# Patient Record
Sex: FEMALE | Race: WHITE | NOT HISPANIC OR LATINO | Employment: OTHER | URBAN - METROPOLITAN AREA
[De-identification: names, ages, dates, MRNs, and addresses within clinical notes are randomized per-mention and may not be internally consistent; named-entity substitution may affect disease eponyms.]

---

## 2024-08-28 ENCOUNTER — APPOINTMENT (OUTPATIENT)
Dept: RADIOLOGY | Facility: HOSPITAL | Age: 79
DRG: 871 | End: 2024-08-28
Payer: MEDICARE

## 2024-08-28 ENCOUNTER — APPOINTMENT (EMERGENCY)
Dept: RADIOLOGY | Facility: HOSPITAL | Age: 79
DRG: 871 | End: 2024-08-28
Payer: MEDICARE

## 2024-08-28 ENCOUNTER — HOSPITAL ENCOUNTER (INPATIENT)
Facility: HOSPITAL | Age: 79
LOS: 9 days | Discharge: NURSING, REHAB OR LONG-TERM CARE | DRG: 871 | End: 2024-09-07
Attending: EMERGENCY MEDICINE
Payer: MEDICARE

## 2024-08-28 DIAGNOSIS — S81.809A MULTIPLE OPEN WOUNDS OF LOWER LEG: ICD-10-CM

## 2024-08-28 DIAGNOSIS — A41.9 SEPSIS, DUE TO UNSPECIFIED ORGANISM, UNSPECIFIED WHETHER ACUTE ORGAN DYSFUNCTION PRESENT (HCC): ICD-10-CM

## 2024-08-28 DIAGNOSIS — R26.2 AMBULATORY DYSFUNCTION: Primary | ICD-10-CM

## 2024-08-28 DIAGNOSIS — S22.49XA MULTIPLE RIB FRACTURES: ICD-10-CM

## 2024-08-28 DIAGNOSIS — I95.9 HYPOTENSION: ICD-10-CM

## 2024-08-28 DIAGNOSIS — L03.116 BILATERAL LOWER LEG CELLULITIS: ICD-10-CM

## 2024-08-28 DIAGNOSIS — L03.115 BILATERAL LOWER LEG CELLULITIS: ICD-10-CM

## 2024-08-28 DIAGNOSIS — R21 RASH: ICD-10-CM

## 2024-08-28 DIAGNOSIS — E55.9 VITAMIN D DEFICIENCY: ICD-10-CM

## 2024-08-28 DIAGNOSIS — B35.1 ONYCHOMYCOSIS: ICD-10-CM

## 2024-08-28 DIAGNOSIS — R53.1 WEAKNESS: ICD-10-CM

## 2024-08-28 PROBLEM — R65.10 SIRS (SYSTEMIC INFLAMMATORY RESPONSE SYNDROME) (HCC): Status: ACTIVE | Noted: 2024-08-28
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
## 2024-08-28 PROBLEM — E87.1 HYPONATREMIA: Status: ACTIVE | Noted: 2024-08-28

## 2024-08-28 LAB
25(OH)D3 SERPL-MCNC: <7 NG/ML (ref 30–100)
2HR DELTA HS TROPONIN: 2 NG/L
4HR DELTA HS TROPONIN: 3 NG/L
ALBUMIN SERPL BCG-MCNC: 3.6 G/DL (ref 3.5–5)
ALP SERPL-CCNC: 118 U/L (ref 34–104)
ALT SERPL W P-5'-P-CCNC: 21 U/L (ref 7–52)
ANION GAP SERPL CALCULATED.3IONS-SCNC: 10 MMOL/L (ref 4–13)
AST SERPL W P-5'-P-CCNC: 19 U/L (ref 13–39)
ATRIAL RATE: 97 BPM
BACTERIA UR QL AUTO: ABNORMAL /HPF
BASOPHILS # BLD AUTO: 0.05 THOUSANDS/ÂΜL (ref 0–0.1)
BASOPHILS NFR BLD AUTO: 0 % (ref 0–1)
BILIRUB SERPL-MCNC: 0.42 MG/DL (ref 0.2–1)
BILIRUB UR QL STRIP: NEGATIVE
BUN SERPL-MCNC: 35 MG/DL (ref 5–25)
CALCIUM SERPL-MCNC: 9.3 MG/DL (ref 8.4–10.2)
CARDIAC TROPONIN I PNL SERPL HS: 11 NG/L
CARDIAC TROPONIN I PNL SERPL HS: 12 NG/L
CARDIAC TROPONIN I PNL SERPL HS: 9 NG/L
CHLORIDE SERPL-SCNC: 102 MMOL/L (ref 96–108)
CK SERPL-CCNC: 25 U/L (ref 26–192)
CLARITY UR: CLEAR
CO2 SERPL-SCNC: 22 MMOL/L (ref 21–32)
COLOR UR: YELLOW
CREAT SERPL-MCNC: 1.16 MG/DL (ref 0.6–1.3)
EOSINOPHIL # BLD AUTO: 0.06 THOUSAND/ÂΜL (ref 0–0.61)
EOSINOPHIL NFR BLD AUTO: 0 % (ref 0–6)
ERYTHROCYTE [DISTWIDTH] IN BLOOD BY AUTOMATED COUNT: 20.1 % (ref 11.6–15.1)
GFR SERPL CREATININE-BSD FRML MDRD: 45 ML/MIN/1.73SQ M
GLUCOSE SERPL-MCNC: 103 MG/DL (ref 65–140)
GLUCOSE UR STRIP-MCNC: NEGATIVE MG/DL
HCT VFR BLD AUTO: 48.3 % (ref 34.8–46.1)
HGB BLD-MCNC: 15 G/DL (ref 11.5–15.4)
HGB UR QL STRIP.AUTO: NEGATIVE
IMM GRANULOCYTES # BLD AUTO: 0.18 THOUSAND/UL (ref 0–0.2)
IMM GRANULOCYTES NFR BLD AUTO: 1 % (ref 0–2)
KETONES UR STRIP-MCNC: ABNORMAL MG/DL
LACTATE SERPL-SCNC: 2 MMOL/L (ref 0.5–2)
LEUKOCYTE ESTERASE UR QL STRIP: NEGATIVE
LYMPHOCYTES # BLD AUTO: 0.48 THOUSANDS/ÂΜL (ref 0.6–4.47)
LYMPHOCYTES NFR BLD AUTO: 3 % (ref 14–44)
MAGNESIUM SERPL-MCNC: 2.4 MG/DL (ref 1.9–2.7)
MCH RBC QN AUTO: 26.3 PG (ref 26.8–34.3)
MCHC RBC AUTO-ENTMCNC: 31.1 G/DL (ref 31.4–37.4)
MCV RBC AUTO: 85 FL (ref 82–98)
MONOCYTES # BLD AUTO: 1.07 THOUSAND/ÂΜL (ref 0.17–1.22)
MONOCYTES NFR BLD AUTO: 7 % (ref 4–12)
MUCOUS THREADS UR QL AUTO: ABNORMAL
NEUTROPHILS # BLD AUTO: 13.77 THOUSANDS/ÂΜL (ref 1.85–7.62)
NEUTS SEG NFR BLD AUTO: 89 % (ref 43–75)
NITRITE UR QL STRIP: NEGATIVE
NON-SQ EPI CELLS URNS QL MICRO: ABNORMAL /HPF
NRBC BLD AUTO-RTO: 0 /100 WBCS
P AXIS: 51 DEGREES
PH UR STRIP.AUTO: 5.5 [PH]
PLATELET # BLD AUTO: 458 THOUSANDS/UL (ref 149–390)
PMV BLD AUTO: 9.6 FL (ref 8.9–12.7)
POTASSIUM SERPL-SCNC: 4.7 MMOL/L (ref 3.5–5.3)
PR INTERVAL: 184 MS
PROT SERPL-MCNC: 7.7 G/DL (ref 6.4–8.4)
PROT UR STRIP-MCNC: ABNORMAL MG/DL
QRS AXIS: 13 DEGREES
QRSD INTERVAL: 62 MS
QT INTERVAL: 326 MS
QTC INTERVAL: 414 MS
RBC # BLD AUTO: 5.7 MILLION/UL (ref 3.81–5.12)
RBC #/AREA URNS AUTO: ABNORMAL /HPF
SODIUM SERPL-SCNC: 134 MMOL/L (ref 135–147)
SP GR UR STRIP.AUTO: 1.02 (ref 1–1.03)
T WAVE AXIS: 37 DEGREES
T4 FREE SERPL-MCNC: 1.03 NG/DL (ref 0.61–1.12)
TSH SERPL DL<=0.05 MIU/L-ACNC: 4.97 UIU/ML (ref 0.45–4.5)
UROBILINOGEN UR STRIP-ACNC: <2 MG/DL
VENTRICULAR RATE: 97 BPM
VIT B12 SERPL-MCNC: 242 PG/ML (ref 180–914)
WBC # BLD AUTO: 15.61 THOUSAND/UL (ref 4.31–10.16)
WBC #/AREA URNS AUTO: ABNORMAL /HPF

## 2024-08-28 PROCEDURE — 93005 ELECTROCARDIOGRAM TRACING: CPT

## 2024-08-28 PROCEDURE — 72125 CT NECK SPINE W/O DYE: CPT

## 2024-08-28 PROCEDURE — 87070 CULTURE OTHR SPECIMN AEROBIC: CPT | Performed by: EMERGENCY MEDICINE

## 2024-08-28 PROCEDURE — 93923 UPR/LXTR ART STDY 3+ LVLS: CPT | Performed by: SURGERY

## 2024-08-28 PROCEDURE — 96365 THER/PROPH/DIAG IV INF INIT: CPT

## 2024-08-28 PROCEDURE — 99285 EMERGENCY DEPT VISIT HI MDM: CPT | Performed by: EMERGENCY MEDICINE

## 2024-08-28 PROCEDURE — 83605 ASSAY OF LACTIC ACID: CPT | Performed by: EMERGENCY MEDICINE

## 2024-08-28 PROCEDURE — 99284 EMERGENCY DEPT VISIT MOD MDM: CPT

## 2024-08-28 PROCEDURE — 84439 ASSAY OF FREE THYROXINE: CPT | Performed by: NURSE PRACTITIONER

## 2024-08-28 PROCEDURE — 87186 SC STD MICRODIL/AGAR DIL: CPT | Performed by: EMERGENCY MEDICINE

## 2024-08-28 PROCEDURE — 87081 CULTURE SCREEN ONLY: CPT | Performed by: NURSE PRACTITIONER

## 2024-08-28 PROCEDURE — 80053 COMPREHEN METABOLIC PANEL: CPT | Performed by: EMERGENCY MEDICINE

## 2024-08-28 PROCEDURE — 70450 CT HEAD/BRAIN W/O DYE: CPT

## 2024-08-28 PROCEDURE — 81001 URINALYSIS AUTO W/SCOPE: CPT | Performed by: NURSE PRACTITIONER

## 2024-08-28 PROCEDURE — 87040 BLOOD CULTURE FOR BACTERIA: CPT | Performed by: EMERGENCY MEDICINE

## 2024-08-28 PROCEDURE — 84484 ASSAY OF TROPONIN QUANT: CPT | Performed by: EMERGENCY MEDICINE

## 2024-08-28 PROCEDURE — 93970 EXTREMITY STUDY: CPT | Performed by: SURGERY

## 2024-08-28 PROCEDURE — 36415 COLL VENOUS BLD VENIPUNCTURE: CPT | Performed by: EMERGENCY MEDICINE

## 2024-08-28 PROCEDURE — 93923 UPR/LXTR ART STDY 3+ LVLS: CPT

## 2024-08-28 PROCEDURE — 87205 SMEAR GRAM STAIN: CPT | Performed by: EMERGENCY MEDICINE

## 2024-08-28 PROCEDURE — 93970 EXTREMITY STUDY: CPT

## 2024-08-28 PROCEDURE — 82607 VITAMIN B-12: CPT | Performed by: NURSE PRACTITIONER

## 2024-08-28 PROCEDURE — 85025 COMPLETE CBC W/AUTO DIFF WBC: CPT | Performed by: EMERGENCY MEDICINE

## 2024-08-28 PROCEDURE — 84443 ASSAY THYROID STIM HORMONE: CPT | Performed by: NURSE PRACTITIONER

## 2024-08-28 PROCEDURE — 74177 CT ABD & PELVIS W/CONTRAST: CPT

## 2024-08-28 PROCEDURE — 84484 ASSAY OF TROPONIN QUANT: CPT | Performed by: NURSE PRACTITIONER

## 2024-08-28 PROCEDURE — 82306 VITAMIN D 25 HYDROXY: CPT | Performed by: NURSE PRACTITIONER

## 2024-08-28 PROCEDURE — 93010 ELECTROCARDIOGRAM REPORT: CPT | Performed by: INTERNAL MEDICINE

## 2024-08-28 PROCEDURE — 1124F ACP DISCUSS-NO DSCNMKR DOCD: CPT | Performed by: EMERGENCY MEDICINE

## 2024-08-28 PROCEDURE — 82550 ASSAY OF CK (CPK): CPT | Performed by: EMERGENCY MEDICINE

## 2024-08-28 PROCEDURE — 83735 ASSAY OF MAGNESIUM: CPT | Performed by: EMERGENCY MEDICINE

## 2024-08-28 PROCEDURE — 99222 1ST HOSP IP/OBS MODERATE 55: CPT | Performed by: STUDENT IN AN ORGANIZED HEALTH CARE EDUCATION/TRAINING PROGRAM

## 2024-08-28 PROCEDURE — 71260 CT THORAX DX C+: CPT

## 2024-08-28 RX ORDER — SACCHAROMYCES BOULARDII 250 MG
250 CAPSULE ORAL 2 TIMES DAILY
Status: DISCONTINUED | OUTPATIENT
Start: 2024-08-28 | End: 2024-09-07 | Stop reason: HOSPADM

## 2024-08-28 RX ORDER — SODIUM CHLORIDE, SODIUM GLUCONATE, SODIUM ACETATE, POTASSIUM CHLORIDE, MAGNESIUM CHLORIDE, SODIUM PHOSPHATE, DIBASIC, AND POTASSIUM PHOSPHATE .53; .5; .37; .037; .03; .012; .00082 G/100ML; G/100ML; G/100ML; G/100ML; G/100ML; G/100ML; G/100ML
100 INJECTION, SOLUTION INTRAVENOUS CONTINUOUS
Status: DISCONTINUED | OUTPATIENT
Start: 2024-08-28 | End: 2024-08-29

## 2024-08-28 RX ORDER — NYSTATIN 100000 [USP'U]/G
POWDER TOPICAL 2 TIMES DAILY
Status: DISCONTINUED | OUTPATIENT
Start: 2024-08-28 | End: 2024-09-07 | Stop reason: HOSPADM

## 2024-08-28 RX ORDER — SODIUM CHLORIDE, SODIUM GLUCONATE, SODIUM ACETATE, POTASSIUM CHLORIDE, MAGNESIUM CHLORIDE, SODIUM PHOSPHATE, DIBASIC, AND POTASSIUM PHOSPHATE .53; .5; .37; .037; .03; .012; .00082 G/100ML; G/100ML; G/100ML; G/100ML; G/100ML; G/100ML; G/100ML
250 INJECTION, SOLUTION INTRAVENOUS ONCE
Status: COMPLETED | OUTPATIENT
Start: 2024-08-28 | End: 2024-08-29

## 2024-08-28 RX ORDER — ACETAMINOPHEN 325 MG/1
650 TABLET ORAL EVERY 6 HOURS SCHEDULED
Status: DISCONTINUED | OUTPATIENT
Start: 2024-08-28 | End: 2024-08-30

## 2024-08-28 RX ORDER — HEPARIN SODIUM 5000 [USP'U]/ML
5000 INJECTION, SOLUTION INTRAVENOUS; SUBCUTANEOUS EVERY 8 HOURS SCHEDULED
Status: DISCONTINUED | OUTPATIENT
Start: 2024-08-28 | End: 2024-08-30

## 2024-08-28 RX ORDER — CEFAZOLIN SODIUM 2 G/50ML
2000 SOLUTION INTRAVENOUS ONCE
Status: COMPLETED | OUTPATIENT
Start: 2024-08-28 | End: 2024-08-28

## 2024-08-28 RX ORDER — SODIUM HYPOCHLORITE 2.5 MG/ML
1 SOLUTION TOPICAL DAILY
Status: DISCONTINUED | OUTPATIENT
Start: 2024-08-29 | End: 2024-09-07 | Stop reason: HOSPADM

## 2024-08-28 RX ORDER — CEFAZOLIN SODIUM 2 G/50ML
2000 SOLUTION INTRAVENOUS EVERY 8 HOURS
Status: DISCONTINUED | OUTPATIENT
Start: 2024-08-28 | End: 2024-08-30

## 2024-08-28 RX ADMIN — VANCOMYCIN HYDROCHLORIDE 2000 MG: 10 INJECTION, POWDER, LYOPHILIZED, FOR SOLUTION INTRAVENOUS at 12:04

## 2024-08-28 RX ADMIN — SODIUM CHLORIDE, SODIUM GLUCONATE, SODIUM ACETATE, POTASSIUM CHLORIDE, MAGNESIUM CHLORIDE, SODIUM PHOSPHATE, DIBASIC, AND POTASSIUM PHOSPHATE 75 ML/HR: .53; .5; .37; .037; .03; .012; .00082 INJECTION, SOLUTION INTRAVENOUS at 13:50

## 2024-08-28 RX ADMIN — SODIUM CHLORIDE, SODIUM GLUCONATE, SODIUM ACETATE, POTASSIUM CHLORIDE, MAGNESIUM CHLORIDE, SODIUM PHOSPHATE, DIBASIC, AND POTASSIUM PHOSPHATE 250 ML: .53; .5; .37; .037; .03; .012; .00082 INJECTION, SOLUTION INTRAVENOUS at 13:49

## 2024-08-28 RX ADMIN — CEFAZOLIN SODIUM 2000 MG: 2 SOLUTION INTRAVENOUS at 11:21

## 2024-08-28 RX ADMIN — ACETAMINOPHEN 650 MG: 325 TABLET ORAL at 20:13

## 2024-08-28 RX ADMIN — HEPARIN SODIUM 5000 UNITS: 5000 INJECTION, SOLUTION INTRAVENOUS; SUBCUTANEOUS at 21:36

## 2024-08-28 RX ADMIN — ACETAMINOPHEN 650 MG: 325 TABLET ORAL at 13:52

## 2024-08-28 RX ADMIN — Medication 250 MG: at 17:58

## 2024-08-28 RX ADMIN — IOHEXOL 100 ML: 350 INJECTION, SOLUTION INTRAVENOUS at 08:59

## 2024-08-28 RX ADMIN — NYSTATIN: 100000 POWDER TOPICAL at 20:15

## 2024-08-28 RX ADMIN — CEFAZOLIN SODIUM 2000 MG: 2 SOLUTION INTRAVENOUS at 19:39

## 2024-08-28 RX ADMIN — HEPARIN SODIUM 5000 UNITS: 5000 INJECTION, SOLUTION INTRAVENOUS; SUBCUTANEOUS at 13:53

## 2024-08-28 NOTE — ED PROVIDER NOTES
History  Chief Complaint   Patient presents with    Fall     Fall in the bathroom this am. Found on floor by ems hour and a half later. Denies head strike or LOC. C/o left knee and lower back pain. Reportedly haven't seen any medical provider for over 10 years, doesn't take any meds. Presents with multiple chronic wounds to BLE.     Patient is a 78-year-old female that does not follow with any physicians, or seen a medical practitioner in about 8 years that presents to the emergency department after an unwitnessed fall.  Patient states that she is typically unsteady on her feet, felt weak while in the bathroom and slid to the ground.  Patient denies head trauma.  She is awake and alert at the time of my initial evaluation.  Patient lives alone and has no family members.  She was able to call her neighbor for assistance after being on the floor for approximately an hour and a half.      History provided by:  Patient   used: No    Fall  Associated symptoms: no abdominal pain, no back pain, no nausea, no neck pain and no vomiting        None       History reviewed. No pertinent past medical history.    History reviewed. No pertinent surgical history.    History reviewed. No pertinent family history.  I have reviewed and agree with the history as documented.    E-Cigarette/Vaping    E-Cigarette Use Never User      E-Cigarette/Vaping Substances    Nicotine No     THC No     CBD No     Flavoring No     Other No     Unknown No      Social History     Tobacco Use    Smoking status: Never    Smokeless tobacco: Never   Vaping Use    Vaping status: Never Used   Substance Use Topics    Drug use: Never       Review of Systems   Constitutional:  Negative for chills and fever.   Respiratory:  Negative for cough, chest tightness and shortness of breath.    Gastrointestinal:  Negative for abdominal pain, diarrhea, nausea and vomiting.   Genitourinary:  Negative for dysuria, frequency, hematuria and urgency.    Musculoskeletal:  Negative for back pain, neck pain and neck stiffness.   Psychiatric/Behavioral:  The patient is not nervous/anxious.    All other systems reviewed and are negative.      Physical Exam  Physical Exam  Vitals and nursing note reviewed.   Constitutional:       General: She is not in acute distress.     Appearance: She is well-developed. She is not diaphoretic.   HENT:      Head: Normocephalic and atraumatic.   Eyes:      Conjunctiva/sclera: Conjunctivae normal.      Pupils: Pupils are equal, round, and reactive to light.   Cardiovascular:      Rate and Rhythm: Normal rate and regular rhythm.      Heart sounds: Normal heart sounds. No murmur heard.  Pulmonary:      Effort: Pulmonary effort is normal. No respiratory distress.      Breath sounds: Normal breath sounds.   Abdominal:      General: Bowel sounds are normal. There is no distension.      Palpations: Abdomen is soft.      Tenderness: There is no abdominal tenderness.   Musculoskeletal:         General: No deformity. Normal range of motion.      Cervical back: Normal range of motion and neck supple.   Skin:     General: Skin is warm and dry.      Capillary Refill: Capillary refill takes less than 2 seconds.      Coloration: Skin is not pale.      Findings: Erythema present. No rash.      Comments: Circumferential erythema noted to distal aspect of bilateral lower extremity, extending from mid leg to ankles, with multiple blisters as pictured below   Neurological:      General: No focal deficit present.      Mental Status: She is alert and oriented to person, place, and time.      Cranial Nerves: No cranial nerve deficit.   Psychiatric:         Behavior: Behavior normal.         Vital Signs  ED Triage Vitals   Temperature Pulse Respirations Blood Pressure SpO2   08/28/24 0739 08/28/24 0739 08/28/24 0739 08/28/24 0739 08/28/24 0739   98 °F (36.7 °C) (!) 108 (!) 23 111/54 97 %      Temp Source Heart Rate Source Patient Position - Orthostatic VS BP  Location FiO2 (%)   08/28/24 1317 08/28/24 0739 08/28/24 0739 08/28/24 0739 --   Oral Monitor Lying Left arm       Pain Score       08/28/24 1317       9           Vitals:    08/28/24 1200 08/28/24 1240 08/28/24 1245 08/28/24 1317   BP: (!) 102/46 (!) 101/45 (!) 101/45 96/63   Pulse: 90 87 88 92   Patient Position - Orthostatic VS:  Lying  Lying         Visual Acuity      ED Medications  Medications   ceFAZolin (ANCEF) IVPB (premix in dextrose) 2,000 mg 50 mL (has no administration in time range)   acetaminophen (TYLENOL) tablet 650 mg (650 mg Oral Given 8/28/24 1352)   heparin (porcine) subcutaneous injection 5,000 Units (5,000 Units Subcutaneous Given 8/28/24 1353)   saccharomyces boulardii (FLORASTOR) capsule 250 mg (has no administration in time range)   multi-electrolyte (PLASMALYTE-A/ISOLYTE-S PH 7.4) IV solution (75 mL/hr Intravenous New Bag 8/28/24 1350)   iohexol (OMNIPAQUE) 350 MG/ML injection (MULTI-DOSE) 100 mL (100 mL Intravenous Given 8/28/24 0859)   ceFAZolin (ANCEF) IVPB (premix in dextrose) 2,000 mg 50 mL (0 mg Intravenous Stopped 8/28/24 1204)   vancomycin (VANCOCIN) 2,000 mg in sodium chloride 0.9 % 500 mL IVPB (2,000 mg Intravenous New Bag 8/28/24 1204)   multi-electrolyte (ISOLYTE-S PH 7.4) bolus 250 mL (250 mL Intravenous New Bag 8/28/24 1349)       Diagnostic Studies  Results Reviewed       Procedure Component Value Units Date/Time    HS Troponin I 4hr [[id number]]  (Normal) Collected: 08/28/24 1511    Lab Status: Final result Specimen: Blood from Arm, Right Updated: 08/28/24 1618     hs TnI 4hr 12 ng/L      Delta 4hr hsTnI 3 ng/L     UA (URINE) with reflex to Scope [[id number]]  (Abnormal) Collected: 08/28/24 1556    Lab Status: Final result Specimen: Urine, Clean Catch Updated: 08/28/24 1606     Color, UA Yellow     Clarity, UA Clear     Specific Gravity, UA 1.025     pH, UA 5.5     Leukocytes, UA Negative     Nitrite, UA Negative     Protein, UA 30 (1+) mg/dl      Glucose, UA Negative mg/dl       Ketones, UA Trace mg/dl      Urobilinogen, UA <2.0 mg/dl      Bilirubin, UA Negative     Occult Blood, UA Negative    Blood culture #1 [[id number]] Collected: 08/28/24 0835    Lab Status: Preliminary result Specimen: Blood from Arm, Right Updated: 08/28/24 1601     Blood Culture Received in Microbiology Lab. Culture in Progress.    Blood culture #2 [[id number]] Collected: 08/28/24 0806    Lab Status: Preliminary result Specimen: Blood from Hand, Right Updated: 08/28/24 1601     Blood Culture Received in Microbiology Lab. Culture in Progress.    MRSA culture [[id number]] Collected: 08/28/24 1315    Lab Status: In process Specimen: Nares from Nose Updated: 08/28/24 1426    HS Troponin I 2hr [[id number]]  (Normal) Collected: 08/28/24 1137    Lab Status: Final result Specimen: Blood from Arm, Right Updated: 08/28/24 1419     hs TnI 2hr 11 ng/L      Delta 2hr hsTnI 2 ng/L     Vitamin D 25 hydroxy [[id number]] Collected: 08/28/24 0802    Lab Status: In process Specimen: Blood from Arm, Left Updated: 08/28/24 1416    Vitamin B12 [[id number]] Collected: 08/28/24 0802    Lab Status: In process Specimen: Blood from Arm, Left Updated: 08/28/24 1416    TSH, 3rd generation with Free T4 reflex [[id number]]  (Abnormal) Collected: 08/28/24 0802    Lab Status: Final result Specimen: Blood from Arm, Left Updated: 08/28/24 1416     TSH 3RD GENERATON 4.968 uIU/mL     T4, free [[id number]] Collected: 08/28/24 0802    Lab Status: In process Specimen: Blood from Arm, Left Updated: 08/28/24 1416    Wound culture and Gram stain [[id number]] Collected: 08/28/24 1120    Lab Status: In process Specimen: Wound from Leg, Right Updated: 08/28/24 1130    HS Troponin 0hr (reflex protocol) [[id number]]  (Normal) Collected: 08/28/24 0802    Lab Status: Final result Specimen: Blood from Arm, Left Updated: 08/28/24 0838     hs TnI 0hr 9 ng/L     Lactic acid, plasma (w/reflex if result > 2.0) [[id number]]  (Normal) Collected: 08/28/24 0802    Lab Status: Final  result Specimen: Blood from Arm, Left Updated: 08/28/24 0836     LACTIC ACID 2.0 mmol/L     Narrative:      Result may be elevated if tourniquet was used during collection.    Comprehensive metabolic panel [[id number]]  (Abnormal) Collected: 08/28/24 0802    Lab Status: Final result Specimen: Blood from Arm, Left Updated: 08/28/24 0830     Sodium 134 mmol/L      Potassium 4.7 mmol/L      Chloride 102 mmol/L      CO2 22 mmol/L      ANION GAP 10 mmol/L      BUN 35 mg/dL      Creatinine 1.16 mg/dL      Glucose 103 mg/dL      Calcium 9.3 mg/dL      AST 19 U/L      ALT 21 U/L      Alkaline Phosphatase 118 U/L      Total Protein 7.7 g/dL      Albumin 3.6 g/dL      Total Bilirubin 0.42 mg/dL      eGFR 45 ml/min/1.73sq m     Narrative:      [employer] guidelines for Chronic Kidney Disease (CKD):     Stage 1 with normal or high GFR (GFR > 90 mL/min/1.73 square meters)    Stage 2 Mild CKD (GFR = 60-89 mL/min/1.73 square meters)    Stage 3A Moderate CKD (GFR = 45-59 mL/min/1.73 square meters)    Stage 3B Moderate CKD (GFR = 30-44 mL/min/1.73 square meters)    Stage 4 Severe CKD (GFR = 15-29 mL/min/1.73 square meters)    Stage 5 End Stage CKD (GFR <15 mL/min/1.73 square meters)  Note: GFR calculation is accurate only with a steady state creatinine    Magnesium [[id number]]  (Normal) Collected: 08/28/24 0802    Lab Status: Final result Specimen: Blood from Arm, Left Updated: 08/28/24 0830     Magnesium 2.4 mg/dL     CK [[id number]]  (Abnormal) Collected: 08/28/24 0802    Lab Status: Final result Specimen: Blood from Arm, Left Updated: 08/28/24 0830     Total CK 25 U/L     CBC and differential [[id number]]  (Abnormal) Collected: 08/28/24 0802    Lab Status: Final result Specimen: Blood from Arm, Left Updated: 08/28/24 0814     WBC 15.61 Thousand/uL      RBC 5.70 Million/uL      Hemoglobin 15.0 g/dL      Hematocrit 48.3 %      MCV 85 fL      MCH 26.3 pg      MCHC 31.1 g/dL      RDW 20.1 %      MPV 9.6 fL       Platelets 458 Thousands/uL      nRBC 0 /100 WBCs      Segmented % 89 %      Immature Grans % 1 %      Lymphocytes % 3 %      Monocytes % 7 %      Eosinophils Relative 0 %      Basophils Relative 0 %      Absolute Neutrophils 13.77 Thousands/µL      Absolute Immature Grans 0.18 Thousand/uL      Absolute Lymphocytes 0.48 Thousands/µL      Absolute Monocytes 1.07 Thousand/µL      Eosinophils Absolute 0.06 Thousand/µL      Basophils Absolute 0.05 Thousands/µL                    CT head without contrast   Final Result by Naeem Weeks MD (08/28 0917)      1.  No acute intracranial abnormality.   2.  Chronic microangiopathic changes.   3.  Significant sinus mucosal disease of the maxillary sinuses.                  Workstation performed: [id number]         CT cervical spine without contrast   Final Result by Naeem Weeks MD (08/28 0939)      1.  No cervical spine fracture or traumatic malalignment.   2.  Severe multilevel degenerative change.   3.  Diminished osseous trabeculation most pronounced at the C6 vertebral body is likely due to low mineral density.               Workstation performed: [id number]         CT chest abdomen pelvis w contrast   Final Result by Naeem Weeks MD (08/28 0947)   1.  Age-indeterminate minimally displaced anterolateral right 10th rib fracture. 2.2 cm region of hypoattenuation in the inferoposterior right hepatic lobe is favored to represent a prominent diaphragmatic slip (normal anatomy), whereas a laceration is    considered less likely despite the slightly more inferior age-indeterminate anterolateral right 10th rib fracture.   2.  Interstitial reticulation in the right lower lobe is likely atelectasis given the elevation of the adjacent diaphragm.                  Workstation performed: [id number]          VAS VENOUS DUPLEX - LOWER LIMB BILATERAL    (Results Pending)   VAS LAM & waveform analysis, multiple levels    (Results Pending)              Procedures  ECG 12 Lead  Documentation Only    Date/Time: 8/28/2024 8:20 AM    Performed by: Javi Pop DO  Authorized by: Javi Pop DO    Indications / Diagnosis:  Weakness  ECG reviewed by me, the ED Provider: yes    Patient location:  ED  Previous ECG:     Previous ECG:  Unavailable    Comparison to cardiac monitor: Yes    Interpretation:     Interpretation: non-specific    Rate:     ECG rate:  97bpm    ECG rate assessment: normal    Rhythm:     Rhythm: sinus rhythm    Ectopy:     Ectopy: none    QRS:     QRS axis:  Normal  Conduction:     Conduction: normal    ST segments:     ST segments:  Normal  T waves:     T waves: normal             ED Course                                 SBIRT 22yo+      Flowsheet Row Most Recent Value   Initial Alcohol Screen: US AUDIT-C     1. How often do you have a drink containing alcohol? 0 Filed at: 08/28/2024 0837   3b. FEMALE Any Age, or MALE 65+: How often do you have 4 or more drinks on one occassion? 0 Filed at: 08/28/2024 0837   Audit-C Score 0 Filed at: 08/28/2024 0837   OSMEL: How many times in the past year have you...    Used an illegal drug or used a prescription medication for non-medical reasons? Never Filed at: 08/28/2024 0837                      Medical Decision Making  78-year-old female in the ED after an unwitnessed fall.  Labs, CT head/cervical spine/chest abdomen pelvis ordered and reviewed to evaluate for acute traumatic pathology.  CT chest abdomen pelvis positive for multiple rib fractures, although patient denies any prior falls.  Will also initiate Ancef/Vanco for bilateral lower extremity cellulitis, and admit patient to hospitalist.    Amount and/or Complexity of Data Reviewed  Labs: ordered.  Radiology: ordered.    Risk  Prescription drug management.  Decision regarding hospitalization.                 Disposition  Final diagnoses:   Ambulatory dysfunction   Weakness   Multiple rib fractures   Bilateral lower leg cellulitis     Time reflects when  diagnosis was documented in both MDM as applicable and the Disposition within this note       Time User Action Codes Description Comment    8/28/2024 11:02 AM Oyesanmi, Olubusola O Add [R26.2] Ambulatory dysfunction     8/28/2024 11:02 AM Oyesanmi, Olubusola O Add [R53.1] Weakness     8/28/2024 11:02 AM Oyesanmi, Olubusola O Add [S22.49XA] Multiple rib fractures     8/28/2024 11:15 AM Oyesanmi, Olubusola O Add [L03.116,  L03.115] Bilateral lower leg cellulitis     8/28/2024  1:19 PM Cammy Gupta [B35.1] Onychomycosis           ED Disposition       ED Disposition   Admit    Condition   Stable    Date/Time   Wed Aug 28, 2024 1103    Comment   Case was discussed with and the patient's admission status was agreed to be Admission Status: observation status to the service of   .               Follow-up Information    None         There are no discharge medications for this patient.      No discharge procedures on file.    PDMP Review         Value Time User    PDMP Reviewed  Yes 8/28/2024 12:22 PM LEEROY Monae            ED Provider  Electronically Signed by             Javi Pop DO  08/28/24 0375

## 2024-08-28 NOTE — ASSESSMENT & PLAN NOTE
Sepsis present on admission evidenced by tachycardia, tachypnea, WBC 15.61.  Lactic acid negative  Suspect source most likely bilateral lower extremity leg wounds  Vancomycin IV provided to patient in ED, follow-up on MRSA swab.  Ancef 2 g IV provided to patient in ER, will continue every 8 hours.  BP noted to be soft after initial assessment; will give Isolyte bolus 250 cc as patient's cardiac status is unknown (has not seen a doctor in 10 years) and IVF @ 75 cc an hour for next 24 hours  Monitor vitals   Follow-up on blood cultures  Follow-up on wound cultures.  Repeat CBC in a.m. and monitor fever curve

## 2024-08-28 NOTE — ASSESSMENT & PLAN NOTE
Patient reports that she has been using a walker or holding onto furniture when she has been ambulating at home.  As noted above sustained a fall morning of presentation secondary to her legs giving out on her.  Spoke with patient's friend Liyah who indicated that patient is a hoarder, and condition of the house is not in good shape.  PT/OT evaluation and treatment ordered, follow-up on recommendations

## 2024-08-28 NOTE — PLAN OF CARE
Problem: Potential for Falls  Goal: Patient will remain free of falls  Description: INTERVENTIONS:  - Educate patient/family on patient safety including physical limitations  - Instruct patient to call for assistance with activity   - Consult OT/PT to assist with strengthening/mobility   - Keep Call bell within reach  - Keep bed low and locked with side rails adjusted as appropriate  - Keep care items and personal belongings within reach  - Initiate and maintain comfort rounds  - Make Fall Risk Sign visible to staff  - Offer Toileting every 2 Hours, in advance of need  - Initiate/Maintain bed/ chair alarm  - Apply yellow socks and bracelet for high fall risk patients  - Consider moving patient to room near nurses station  Outcome: Progressing     Problem: PAIN - ADULT  Goal: Verbalizes/displays adequate comfort level or baseline comfort level  Description: Interventions:  - Encourage patient to monitor pain and request assistance  - Assess pain using appropriate pain scale  - Administer analgesics based on type and severity of pain and evaluate response  - Implement non-pharmacological measures as appropriate and evaluate response  - Consider cultural and social influences on pain and pain management  - Notify physician/advanced practitioner if interventions unsuccessful or patient reports new pain  Outcome: Progressing     Problem: INFECTION - ADULT  Goal: Absence or prevention of progression during hospitalization  Description: INTERVENTIONS:  - Assess and monitor for signs and symptoms of infection  - Monitor lab/diagnostic results  - Monitor all insertion sites, i.e. indwelling lines, tubes, and drains  - Monitor endotracheal if appropriate and nasal secretions for changes in amount and color  - [place] appropriate cooling/warming therapies per order  - Administer medications as ordered  - Instruct and encourage patient and family to use good hand hygiene technique  - Identify and instruct in appropriate  isolation precautions for identified infection/condition  Outcome: Progressing  Goal: Absence of fever/infection during neutropenic period  Description: INTERVENTIONS:  - Monitor WBC    Outcome: Progressing     Problem: SAFETY ADULT  Goal: Patient will remain free of falls  Description: INTERVENTIONS:  - Educate patient/family on patient safety including physical limitations  - Instruct patient to call for assistance with activity   - Consult OT/PT to assist with strengthening/mobility   - Keep Call bell within reach  - Keep bed low and locked with side rails adjusted as appropriate  - Keep care items and personal belongings within reach  - Initiate and maintain comfort rounds  - Make Fall Risk Sign visible to staff  - Offer Toileting every 2 Hours, in advance of need  - Initiate/Maintain bed/chair alarm  - Apply yellow socks and bracelet for high fall risk patients  - Consider moving patient to room near nurses station  Outcome: Progressing  Goal: Maintain or return to baseline ADL function  Description: INTERVENTIONS:  -  Assess patient's ability to carry out ADLs; assess patient's baseline for ADL function and identify physical deficits which impact ability to perform ADLs (bathing, care of mouth/teeth, toileting, grooming, dressing, etc.)  - Assess/evaluate cause of self-care deficits   - Assess range of motion  - Assess patient's mobility; develop plan if impaired  - Assess patient's need for assistive devices and provide as appropriate  - Encourage maximum independence but intervene and supervise when necessary  - Involve family in performance of ADLs  - Assess for home care needs following discharge   - Consider OT consult to assist with ADL evaluation and planning for discharge  - Provide patient education as appropriate  Outcome: Progressing  Goal: Maintains/Returns to pre admission functional level  Description: INTERVENTIONS:  - Perform AM-PAC 6 Click Basic Mobility/ Daily Activity assessment daily.  -  Set and communicate daily mobility goal to care team and patient/family/caregiver.   - Collaborate with rehabilitation services on mobility goals if consulted  - Perform Range of Motion 2 times a day.  - Reposition patient every 2 hours.  - Dangle patient 2 times a day  - Stand patient 2 times a day  - Ambulate patient 2 times a day  - Out of bed to chair 2 times a day   - Out of bed for meals 2 times a day  - Out of bed for toileting  - Record patient progress and toleration of activity level   Outcome: Progressing     Problem: DISCHARGE PLANNING  Goal: Discharge to home or other facility with appropriate resources  Description: INTERVENTIONS:  - Identify barriers to discharge w/patient and caregiver  - Arrange for needed discharge resources and transportation as appropriate  - Identify discharge learning needs (meds, wound care, etc.)  - Arrange for interpretive services to assist at discharge as needed  - Refer to Case Management Department for coordinating discharge planning if the patient needs post-hospital services based on physician/advanced practitioner order or complex needs related to functional status, cognitive ability, or social support system  Outcome: Progressing     Problem: Knowledge Deficit  Goal: Patient/family/caregiver demonstrates understanding of disease process, treatment plan, medications, and discharge instructions  Description: Complete learning assessment and assess knowledge base.  Interventions:  - Provide teaching at level of understanding  - Provide teaching via preferred learning methods  Outcome: Progressing     Problem: METABOLIC, FLUID AND ELECTROLYTES - ADULT  Goal: Electrolytes maintained within normal limits  Description: INTERVENTIONS:  - Monitor labs and assess patient for signs and symptoms of electrolyte imbalances  - Administer electrolyte replacement as ordered  - Monitor response to electrolyte replacements, including repeat lab results as appropriate  - Instruct  patient on fluid and nutrition as appropriate  Outcome: Progressing  Goal: Fluid balance maintained  Description: INTERVENTIONS:  - Monitor labs   - Monitor I/O and WT  - Instruct patient on fluid and nutrition as appropriate  - Assess for signs & symptoms of volume excess or deficit  Outcome: Progressing  Goal: Glucose maintained within target range  Description: INTERVENTIONS:  - Monitor Blood Glucose as ordered  - Assess for signs and symptoms of hyperglycemia and hypoglycemia  - Administer ordered medications to maintain glucose within target range  - Assess nutritional intake and initiate nutrition service referral as needed  Outcome: Progressing     Problem: HEMATOLOGIC - ADULT  Goal: Maintains hematologic stability  Description: INTERVENTIONS  - Assess for signs and symptoms of bleeding or hemorrhage  - Monitor labs  - Administer supportive blood products/factors as ordered and appropriate  Outcome: Progressing     Problem: MUSCULOSKELETAL - ADULT  Goal: Maintain or return mobility to safest level of function  Description: INTERVENTIONS:  - Assess patient's ability to carry out ADLs; assess patient's baseline for ADL function and identify physical deficits which impact ability to perform ADLs (bathing, care of mouth/teeth, toileting, grooming, dressing, etc.)  - Assess/evaluate cause of self-care deficits   - Assess range of motion  - Assess patient's mobility  - Assess patient's need for assistive devices and provide as appropriate  - Encourage maximum independence but intervene and supervise when necessary  - Involve family in performance of ADLs  - Assess for home care needs following discharge   - Consider OT consult to assist with ADL evaluation and planning for discharge  - Provide patient education as appropriate  Outcome: Progressing  Goal: Maintain proper alignment of affected body part  Description: INTERVENTIONS:  - Support, maintain and protect limb and body alignment  - Provide patient/ family  with appropriate education  Outcome: Progressing

## 2024-08-28 NOTE — H&P
"[facility]  H&P  Name: Barbie Medel 78 y.o. female I MRN: [id number]  Unit/Bed#: [address] [place] [address] Date of Admission: 8/28/2024   Date of Service: 8/28/2024 I Hospital Day: 0      Assessment & Plan   * Sepsis (HCC)  Assessment & Plan  Sepsis present on admission evidenced by tachycardia, tachypnea, WBC 15.61.  Lactic acid negative  Suspect source most likely bilateral lower extremity leg wounds  Vancomycin IV provided to patient in ED, follow-up on MRSA swab.  Ancef 2 g IV provided to patient in ER, will continue every 8 hours.  BP noted to be soft after initial assessment; will give Isolyte bolus 250 cc as patient's cardiac status is unknown (has not seen a doctor in 10 years) and IVF @ 75 cc an hour for next 24 hours  Monitor vitals   Follow-up on blood cultures  Follow-up on wound cultures.  Repeat CBC in a.m. and monitor fever curve    Multiple open wounds of lower leg  Assessment & Plan  Patient has multiple open wounds of bilateral lower extremities, erythema and drainage noted.  Blood cultures obtained and urine culture sent by ER.  Patient started on IV Ancef, will continue every 8 hours.  Patient received dose of IV vancomycin, follow-up on MRSA swab  Wound care nurse consulted follow-up on recommendations.  Will also consult surgery as wounds appear to have some areas of eschar noted  Will obtain venous duplex and LAM lower extremities as legs have swelling along with the wounds  Round-the-clock Tylenol ordered as patient does experience some discomfort  Monitor.    Fall  Assessment & Plan  Patient sustained fall in her bathroom, reported that her legs \"just gave out\".  Denies striking head or loss of consciousness.  She was on the floor for 1-1/2 hours before she was able to get her phone off of a shelf and call 911.  She was brought to the emergency department for further evaluation and treatment.  In the ED CT of the head was performed which was negative for intra cranial " abnormality, chronic microangiopathic changes, significant sinus mucosal disease of maxillary sinuses as per radiology report.  CT of cervical spine was also performed which showed no fracture or malalignment, severe multilevel degenerative changes.  Diminished osseous trabeculation most pronounced in the C6 vertebral body is likely due to low mineral density  CT chest abdomen pelvis was performed showing age-indeterminate minimally displaced anterior lateral right 10th rib fracture, 2.2 cm region of hypoattenuation in the inferior posterior right hepatic lobe favored to represent a prominent diaphragmatic slip (normal anatomy) where his laceration is less likely despite the age indeterminant right rib fracture as per radiology report.  Patient denies rib pain.  PT/OT evaluation and treatment ordered.      Hyponatremia  Assessment & Plan  Mild hyponatremia present on admission as evidenced by sodium of 134.  Repeat BMP in a.m.    Ambulatory dysfunction  Assessment & Plan  Patient reports that she has been using a walker or holding onto furniture when she has been ambulating at home.  As noted above sustained a fall morning of presentation secondary to her legs giving out on her.  Spoke with patient's friend Liyah who indicated that patient is a hoarder, and condition of the house is not in good shape.  PT/OT evaluation and treatment ordered, follow-up on recommendations    Onychomycosis  Assessment & Plan  Noted bilateral feet  Consult podiatry          VTE Pharmacologic Prophylaxis:   Moderate Risk (Score 3-4) - Pharmacological DVT Prophylaxis Ordered: heparin.  Code Status:   Full code discussed with patient at bedside   Discussion with family: Updated  (friend) at bedside.    Anticipated Length of Stay: Patient will be admitted on an observation basis with an anticipated length of stay of less than 2 midnights secondary to leg wounds, IV abx, PT and OT evaluation and treatment .    Total Time  "Spent on Date of Encounter in care of patient: greater than 45 mins. This time was spent on one or more of the following: performing physical exam; counseling and coordination of care; obtaining or reviewing history; documenting in the medical record; reviewing/ordering tests, medications or procedures; communicating with other healthcare professionals and discussing with patient's family/caregivers.    Chief Complaint:  Fall at home, ambulatory dysfunction     History of Present Illness:  Barbie Medel is a 78 y.o. female with with no significant PMH who presents with fall at home.  Patient reports that she was ambulating into the bathroom when her \"legs just gave out\" and she fell to the floor.  She denied any head strike or loss of consciousness.  Her phone was unfortunately out of reach and it took her about an hour and a half to eventually knock it off the shelf and she was able to call 911.  She was brought to the emergency department for further evaluation and treatment.  In the ED patient was noted to have tachycardia 108, tachypnea 23, WBC 15.61.  Troponin negative, lactic acid 2.0 and total CK20 5.  She had a CT scan of the head which was negative for intracranial abnormality, chronic microangiopathic changes, significant sinus mucosal disease of maxillary sinuses as per radiology report.  CT cervical spine showed no fracture or malalignment, severe multilevel degenerative changes as per radiology report.  CT chest abdomen pelvis demonstrated age-indeterminate minimally displaced anterior lateral right 10th rib fracture, a 2.2 cm region of hypoattenuation in the inferoposterior right hepatic lobe favored to represent prominent diaphragmatic slip (normal anatomy) whereas laceration is less likely despite the age-indeterminate rib fracture as per radiology report.  The patient was noted to have bilateral lower extremity edema with significant uricemia and multiple wounds including areas of eschar noted.  " She was started on IV Ancef and vancomycin, will continue with Ancef 2 g every 8 hours at this time, follow-up on MRSA swab.  Wound care nurse is consulted as is acute care surgery to evaluate wounds for potential debridement. We will also get venous duplex bilateral LEs and also LAM of bilateral LEs.   PT/OT evaluation and treatment are also ordered, follow-up on recommendations.  Time spent speaking with the patient's best friend Liyah at the bedside, she indicated that the patient's living conditions are not very good as the patient is a hoarder, and the house is filled with belongings and not very sanitary.  She also indicated the patient lives by herself, has only a niece that lives in [place], no other family.  Medical plan was discussed with the patient and her friend at the bedside, they both verbalized understanding and are agreeable to the plan.  CODE STATUS was discussed with the patient at the bedside and she is to be a full code.      Review of Systems:  Review of Systems   Constitutional:  Negative for chills, fatigue and fever.   HENT:  Negative for congestion.    Eyes: Negative.    Respiratory:  Negative for cough, shortness of breath and wheezing.    Cardiovascular:  Positive for leg swelling. Negative for chest pain and palpitations.   Gastrointestinal:         Patient reports hemorrhoids, not bleeding at this time     Endocrine: Negative.    Genitourinary:  Negative for difficulty urinating.   Musculoskeletal:  Positive for gait problem.   Skin:  Positive for wound.   Allergic/Immunologic: Negative.    Neurological:  Negative for dizziness and headaches.   Hematological: Negative.    Psychiatric/Behavioral: Negative.         Past Medical and Surgical History:   History reviewed. No pertinent past medical history.    History reviewed. No pertinent surgical history.    Meds/Allergies:  Prior to Admission medications    Not on File     I have reviewed home medications with patient  "personally.    Allergies:   Allergies   Allergen Reactions    Augmentin [Amoxicillin-Pot Clavulanate] Hives    Codeine Syncope    Erythromycin Hives       Social History:  Marital Status:    Occupation: Retired retail   Patient Pre-hospital Living Situation: Home, Alone  Patient Pre-hospital Level of Mobility: walks with walker  Patient Pre-hospital Diet Restrictions: None   Substance Use History:   Social History     Substance and Sexual Activity   Alcohol Use None     Social History     Tobacco Use   Smoking Status Never   Smokeless Tobacco Never     Social History     Substance and Sexual Activity   Drug Use Never       Family History:  History reviewed. No pertinent family history.    Physical Exam:     Vitals:   Blood Pressure: 96/63 (08/28/24 1317)  Pulse: 92 (08/28/24 1317)  Temperature: (!) 97.4 °F (36.3 °C) (08/28/24 1317)  Temp Source: Oral (08/28/24 1317)  Respirations: 20 (08/28/24 1317)  Height: 5' 4\" (162.6 cm) (08/28/24 1317)  Weight - Scale: 84.7 kg (186 lb 11.2 oz) (08/28/24 1317)  SpO2: 99 % (08/28/24 1317)    Physical Exam  Vitals and nursing note reviewed.   Constitutional:       General: She is not in acute distress.     Appearance: She is obese.   HENT:      Head: Normocephalic.      Nose: Nose normal.      Mouth/Throat:      Mouth: Mucous membranes are moist.   Eyes:      Extraocular Movements: Extraocular movements intact.      Conjunctiva/sclera: Conjunctivae normal.      Pupils: Pupils are equal, round, and reactive to light.   Cardiovascular:      Rate and Rhythm: Normal rate and regular rhythm.      Pulses: Normal pulses.      Heart sounds: Normal heart sounds.   Pulmonary:      Effort: Pulmonary effort is normal.      Breath sounds: Normal breath sounds.   Abdominal:      General: Bowel sounds are normal. There is no distension.      Palpations: Abdomen is soft.      Tenderness: There is no abdominal tenderness.   Genitourinary:     Comments: Voiding spontaneously " "  Musculoskeletal:      Cervical back: Normal range of motion.      Right lower leg: Edema present.      Left lower leg: Edema present.      Comments: Deconditioning    Skin:     Capillary Refill: Capillary refill takes less than 2 seconds.      Comments: Bilateral LE wounds, erythema. Green/tan drainage noted; some black areas also noted.    Neurological:      General: No focal deficit present.      Mental Status: She is alert and oriented to person, place, and time.      Comments: Hard of hearing    Psychiatric:         Mood and Affect: Mood normal.         Behavior: Behavior normal.         Thought Content: Thought content normal.         Judgment: Judgment normal.          Additional Data:     Lab Results:  Results from last 7 days   Lab Units 08/28/24  0802   WBC Thousand/uL 15.61*   HEMOGLOBIN g/dL 15.0   HEMATOCRIT % 48.3*   PLATELETS Thousands/uL 458*   SEGS PCT % 89*   LYMPHO PCT % 3*   MONO PCT % 7   EOS PCT % 0     Results from last 7 days   Lab Units 08/28/24  0802   SODIUM mmol/L 134*   POTASSIUM mmol/L 4.7   CHLORIDE mmol/L 102   CO2 mmol/L 22   BUN mg/dL 35*   CREATININE mg/dL 1.16   ANION GAP mmol/L 10   CALCIUM mg/dL 9.3   ALBUMIN g/dL 3.6   TOTAL BILIRUBIN mg/dL 0.42   ALK PHOS U/L 118*   ALT U/L 21   AST U/L 19   GLUCOSE RANDOM mg/dL 103             No results found for: \"HGBA1C\"  Results from last 7 days   Lab Units 08/28/24  0802   LACTIC ACID mmol/L 2.0       Lines/Drains:  Invasive Devices       Peripheral Intravenous Line  Duration             Peripheral IV 08/28/24 Left Antecubital <1 day    Peripheral IV 08/28/24 Right Antecubital <1 day                        Imaging: Reviewed radiology reports from this admission including: chest CT scan, abdominal/pelvic CT, CT head, and cervical spine CT   CT head without contrast   Final Result by Naeem Weeks MD (08/28 0917)      1.  No acute intracranial abnormality.   2.  Chronic microangiopathic changes.   3.  Significant sinus mucosal disease " of the maxillary sinuses.                  Workstation performed: [id number]         CT cervical spine without contrast   Final Result by Naeem Weeks MD (08/28 0939)      1.  No cervical spine fracture or traumatic malalignment.   2.  Severe multilevel degenerative change.   3.  Diminished osseous trabeculation most pronounced at the C6 vertebral body is likely due to low mineral density.               Workstation performed: [id number]         CT chest abdomen pelvis w contrast   Final Result by Naeem Weeks MD (08/28 0947)   1.  Age-indeterminate minimally displaced anterolateral right 10th rib fracture. 2.2 cm region of hypoattenuation in the inferoposterior right hepatic lobe is favored to represent a prominent diaphragmatic slip (normal anatomy), whereas a laceration is    considered less likely despite the slightly more inferior age-indeterminate anterolateral right 10th rib fracture.   2.  Interstitial reticulation in the right lower lobe is likely atelectasis given the elevation of the adjacent diaphragm.                  Workstation performed: [id number]          VAS VENOUS DUPLEX - LOWER LIMB BILATERAL    (Results Pending)   VAS LAM & waveform analysis, multiple levels    (Results Pending)       EKG and Other Studies Reviewed on Admission:   EKG: No EKG obtained.    ** Please Note: This note has been constructed using a voice recognition system. **

## 2024-08-28 NOTE — ASSESSMENT & PLAN NOTE
"Patient sustained fall in her bathroom, reported that her legs \"just gave out\".  Denies striking head or loss of consciousness.  She was on the floor for 1-1/2 hours before she was able to get her phone off of a shelf and call 911.  She was brought to the emergency department for further evaluation and treatment.  In the ED CT of the head was performed which was negative for intra cranial abnormality, chronic microangiopathic changes, significant sinus mucosal disease of maxillary sinuses as per radiology report.  CT of cervical spine was also performed which showed no fracture or malalignment, severe multilevel degenerative changes.  Diminished osseous trabeculation most pronounced in the C6 vertebral body is likely due to low mineral density  CT chest abdomen pelvis was performed showing age-indeterminate minimally displaced anterior lateral right 10th rib fracture, 2.2 cm region of hypoattenuation in the inferior posterior right hepatic lobe favored to represent a prominent diaphragmatic slip (normal anatomy) where his laceration is less likely despite the age indeterminant right rib fracture as per radiology report.  Patient denies rib pain.  PT/OT evaluation and treatment ordered.    "

## 2024-08-28 NOTE — ED NOTES
Pt. Was placed on   bedpan states she  had to urinate and possibly have a BM. Only small amount of urine that spilled out of bed pan at this time . Changed all pads under patient.  Right leg has  large amount of exudate and oozing on pads .  Pt. States that both legs are painful both are  red open wounds and scaling with   very pungent   odor.                    Kailey Lindquist RN  08/28/24 1818

## 2024-08-28 NOTE — ASSESSMENT & PLAN NOTE
Patient has multiple open wounds of bilateral lower extremities, erythema and drainage noted.  Blood cultures obtained and urine culture sent by ER.  Patient started on IV Ancef, will continue every 8 hours.  Patient received dose of IV vancomycin, follow-up on MRSA swab  Wound care nurse consulted follow-up on recommendations.  Will also consult surgery as wounds appear to have some areas of eschar noted  Will obtain venous duplex and LAM lower extremities as legs have swelling along with the wounds  Round-the-clock Tylenol ordered as patient does experience some discomfort  Monitor.

## 2024-08-29 LAB
ALBUMIN SERPL BCG-MCNC: 2.7 G/DL (ref 3.5–5)
ALP SERPL-CCNC: 99 U/L (ref 34–104)
ALT SERPL W P-5'-P-CCNC: 6 U/L (ref 7–52)
ANION GAP SERPL CALCULATED.3IONS-SCNC: 10 MMOL/L (ref 4–13)
ANION GAP SERPL CALCULATED.3IONS-SCNC: 9 MMOL/L (ref 4–13)
AST SERPL W P-5'-P-CCNC: 13 U/L (ref 13–39)
BILIRUB SERPL-MCNC: 0.19 MG/DL (ref 0.2–1)
BNP SERPL-MCNC: 45 PG/ML (ref 0–100)
BUN SERPL-MCNC: 30 MG/DL (ref 5–25)
BUN SERPL-MCNC: 31 MG/DL (ref 5–25)
CALCIUM ALBUM COR SERPL-MCNC: 8.9 MG/DL (ref 8.3–10.1)
CALCIUM SERPL-MCNC: 7.7 MG/DL (ref 8.4–10.2)
CALCIUM SERPL-MCNC: 7.9 MG/DL (ref 8.4–10.2)
CHLORIDE SERPL-SCNC: 98 MMOL/L (ref 96–108)
CHLORIDE SERPL-SCNC: 99 MMOL/L (ref 96–108)
CO2 SERPL-SCNC: 20 MMOL/L (ref 21–32)
CO2 SERPL-SCNC: 22 MMOL/L (ref 21–32)
CREAT SERPL-MCNC: 1.06 MG/DL (ref 0.6–1.3)
CREAT SERPL-MCNC: 1.06 MG/DL (ref 0.6–1.3)
ERYTHROCYTE [DISTWIDTH] IN BLOOD BY AUTOMATED COUNT: 19 % (ref 11.6–15.1)
GFR SERPL CREATININE-BSD FRML MDRD: 50 ML/MIN/1.73SQ M
GFR SERPL CREATININE-BSD FRML MDRD: 50 ML/MIN/1.73SQ M
GLUCOSE SERPL-MCNC: 85 MG/DL (ref 65–140)
GLUCOSE SERPL-MCNC: 91 MG/DL (ref 65–140)
HCT VFR BLD AUTO: 37.5 % (ref 34.8–46.1)
HGB BLD-MCNC: 12.1 G/DL (ref 11.5–15.4)
MAGNESIUM SERPL-MCNC: 2.3 MG/DL (ref 1.9–2.7)
MCH RBC QN AUTO: 26.8 PG (ref 26.8–34.3)
MCHC RBC AUTO-ENTMCNC: 32.3 G/DL (ref 31.4–37.4)
MCV RBC AUTO: 83 FL (ref 82–98)
PLATELET # BLD AUTO: 337 THOUSANDS/UL (ref 149–390)
PMV BLD AUTO: 9.3 FL (ref 8.9–12.7)
POTASSIUM SERPL-SCNC: 3.8 MMOL/L (ref 3.5–5.3)
POTASSIUM SERPL-SCNC: 4 MMOL/L (ref 3.5–5.3)
PROCALCITONIN SERPL-MCNC: 1.57 NG/ML
PROT SERPL-MCNC: 5.7 G/DL (ref 6.4–8.4)
RBC # BLD AUTO: 4.52 MILLION/UL (ref 3.81–5.12)
SODIUM SERPL-SCNC: 129 MMOL/L (ref 135–147)
SODIUM SERPL-SCNC: 129 MMOL/L (ref 135–147)
WBC # BLD AUTO: 7.05 THOUSAND/UL (ref 4.31–10.16)

## 2024-08-29 PROCEDURE — 97163 PT EVAL HIGH COMPLEX 45 MIN: CPT

## 2024-08-29 PROCEDURE — 84145 PROCALCITONIN (PCT): CPT | Performed by: NURSE PRACTITIONER

## 2024-08-29 PROCEDURE — 97167 OT EVAL HIGH COMPLEX 60 MIN: CPT

## 2024-08-29 PROCEDURE — 85027 COMPLETE CBC AUTOMATED: CPT | Performed by: NURSE PRACTITIONER

## 2024-08-29 PROCEDURE — 80048 BASIC METABOLIC PNL TOTAL CA: CPT | Performed by: NURSE PRACTITIONER

## 2024-08-29 PROCEDURE — 99223 1ST HOSP IP/OBS HIGH 75: CPT | Performed by: SPECIALIST

## 2024-08-29 PROCEDURE — 83880 ASSAY OF NATRIURETIC PEPTIDE: CPT | Performed by: NURSE PRACTITIONER

## 2024-08-29 PROCEDURE — 11721 DEBRIDE NAIL 6 OR MORE: CPT | Performed by: STUDENT IN AN ORGANIZED HEALTH CARE EDUCATION/TRAINING PROGRAM

## 2024-08-29 PROCEDURE — 83735 ASSAY OF MAGNESIUM: CPT | Performed by: NURSE PRACTITIONER

## 2024-08-29 PROCEDURE — 80053 COMPREHEN METABOLIC PANEL: CPT | Performed by: NURSE PRACTITIONER

## 2024-08-29 PROCEDURE — 99221 1ST HOSP IP/OBS SF/LOW 40: CPT | Performed by: STUDENT IN AN ORGANIZED HEALTH CARE EDUCATION/TRAINING PROGRAM

## 2024-08-29 PROCEDURE — 97535 SELF CARE MNGMENT TRAINING: CPT

## 2024-08-29 PROCEDURE — 99232 SBSQ HOSP IP/OBS MODERATE 35: CPT | Performed by: NURSE PRACTITIONER

## 2024-08-29 PROCEDURE — 97110 THERAPEUTIC EXERCISES: CPT

## 2024-08-29 RX ORDER — SODIUM CHLORIDE, SODIUM GLUCONATE, SODIUM ACETATE, POTASSIUM CHLORIDE, MAGNESIUM CHLORIDE, SODIUM PHOSPHATE, DIBASIC, AND POTASSIUM PHOSPHATE .53; .5; .37; .037; .03; .012; .00082 G/100ML; G/100ML; G/100ML; G/100ML; G/100ML; G/100ML; G/100ML
500 INJECTION, SOLUTION INTRAVENOUS ONCE
Status: COMPLETED | OUTPATIENT
Start: 2024-08-29 | End: 2024-08-29

## 2024-08-29 RX ORDER — SODIUM CHLORIDE 9 MG/ML
100 INJECTION, SOLUTION INTRAVENOUS CONTINUOUS
Status: DISCONTINUED | OUTPATIENT
Start: 2024-08-29 | End: 2024-08-30

## 2024-08-29 RX ORDER — VANCOMYCIN HYDROCHLORIDE 1 G/200ML
12.5 INJECTION, SOLUTION INTRAVENOUS EVERY 24 HOURS
Status: DISCONTINUED | OUTPATIENT
Start: 2024-08-29 | End: 2024-08-30

## 2024-08-29 RX ORDER — VANCOMYCIN HYDROCHLORIDE 1 G/200ML
12.5 INJECTION, SOLUTION INTRAVENOUS EVERY 24 HOURS
Status: DISCONTINUED | OUTPATIENT
Start: 2024-08-30 | End: 2024-08-29

## 2024-08-29 RX ORDER — ERGOCALCIFEROL 1.25 MG/1
50000 CAPSULE, LIQUID FILLED ORAL WEEKLY
Status: DISCONTINUED | OUTPATIENT
Start: 2024-08-29 | End: 2024-09-07 | Stop reason: HOSPADM

## 2024-08-29 RX ADMIN — CEFAZOLIN SODIUM 2000 MG: 2 SOLUTION INTRAVENOUS at 21:08

## 2024-08-29 RX ADMIN — SODIUM CHLORIDE 1000 ML: 0.9 INJECTION, SOLUTION INTRAVENOUS at 21:08

## 2024-08-29 RX ADMIN — SODIUM CHLORIDE, SODIUM GLUCONATE, SODIUM ACETATE, POTASSIUM CHLORIDE, MAGNESIUM CHLORIDE, SODIUM PHOSPHATE, DIBASIC, AND POTASSIUM PHOSPHATE 500 ML: .53; .5; .37; .037; .03; .012; .00082 INJECTION, SOLUTION INTRAVENOUS at 09:44

## 2024-08-29 RX ADMIN — HYOSCYAMINE SULFATE 1 APPLICATION: 16 SOLUTION at 05:44

## 2024-08-29 RX ADMIN — NYSTATIN: 100000 POWDER TOPICAL at 09:56

## 2024-08-29 RX ADMIN — NYSTATIN: 100000 POWDER TOPICAL at 18:15

## 2024-08-29 RX ADMIN — Medication 250 MG: at 09:56

## 2024-08-29 RX ADMIN — SODIUM CHLORIDE 100 ML/HR: 0.9 INJECTION, SOLUTION INTRAVENOUS at 21:09

## 2024-08-29 RX ADMIN — HEPARIN SODIUM 5000 UNITS: 5000 INJECTION, SOLUTION INTRAVENOUS; SUBCUTANEOUS at 05:40

## 2024-08-29 RX ADMIN — ERGOCALCIFEROL 50000 UNITS: 1.25 CAPSULE ORAL at 09:56

## 2024-08-29 RX ADMIN — ACETAMINOPHEN 650 MG: 325 TABLET ORAL at 05:39

## 2024-08-29 RX ADMIN — ACETAMINOPHEN 650 MG: 325 TABLET ORAL at 18:12

## 2024-08-29 RX ADMIN — SODIUM CHLORIDE 1000 ML: 0.9 INJECTION, SOLUTION INTRAVENOUS at 23:00

## 2024-08-29 RX ADMIN — CEFAZOLIN SODIUM 2000 MG: 2 SOLUTION INTRAVENOUS at 12:12

## 2024-08-29 RX ADMIN — ACETAMINOPHEN 650 MG: 325 TABLET ORAL at 12:11

## 2024-08-29 RX ADMIN — HEPARIN SODIUM 5000 UNITS: 5000 INJECTION, SOLUTION INTRAVENOUS; SUBCUTANEOUS at 21:09

## 2024-08-29 RX ADMIN — CEFAZOLIN SODIUM 2000 MG: 2 SOLUTION INTRAVENOUS at 03:33

## 2024-08-29 RX ADMIN — Medication 250 MG: at 18:14

## 2024-08-29 RX ADMIN — SODIUM CHLORIDE, SODIUM GLUCONATE, SODIUM ACETATE, POTASSIUM CHLORIDE, MAGNESIUM CHLORIDE, SODIUM PHOSPHATE, DIBASIC, AND POTASSIUM PHOSPHATE 75 ML/HR: .53; .5; .37; .037; .03; .012; .00082 INJECTION, SOLUTION INTRAVENOUS at 02:02

## 2024-08-29 RX ADMIN — VANCOMYCIN HYDROCHLORIDE 1000 MG: 1 INJECTION, SOLUTION INTRAVENOUS at 22:38

## 2024-08-29 RX ADMIN — HEPARIN SODIUM 5000 UNITS: 5000 INJECTION, SOLUTION INTRAVENOUS; SUBCUTANEOUS at 14:37

## 2024-08-29 NOTE — CONSULTS
Podiatry - Consultation    Patient Information:   Barbie Medel 78 y.o. female MRN: [id number]  Unit/Bed#: [address] [place] [address] Encounter: [id number]  PCP: No primary care provider on file.  Date of Admission:  8/28/2024  Date of Consultation: 08/29/24  Requesting Physician: Peyton Romo MD      ASSESSMENT:    Barbie Medel is a 78 y.o. female with:    Onychomycosis    PLAN:    Toenails x10 were excisionally debrided using a large nipper to normal length and thickness without incident.  Weight bearing as tolerated  Rest of Medical care per primary team.  Podiatry signing off, thank you for the consult! Please contact with any questions or concerns.      SUBJECTIVE:    History of Present Illness:    Barbie Medel is a 78 y.o. female who is originally admitted 8/28/2024 due to sepsis with a past medical history of ambulatory dysfunction.    We are consulted for painful, elongated toenails x 10. They state that they have difficulty applying their socks and shoes due to the elongation of the nails. They report pain with palpation and pressure within their shoe gear. They have been unable to cut their nails adequately.  Patient does have bilateral lower extremity wounds which she states has been present for about a month, these are being managed by the general surgery team however.    Review of Systems:    Constitutional: Negative.    HENT: Negative.    Eyes: Negative.    Respiratory: Negative.    Cardiovascular: Negative.    Gastrointestinal: Negative.    Musculoskeletal: leg pain  Skin: Thickened, elongated toenails.  Bilateral lower extremity wounds  Neurological: Negative  Psych: Negative.     Past Medical and Surgical History:     History reviewed. No pertinent past medical history.    History reviewed. No pertinent surgical history.    Meds/Allergies:    No medications prior to admission.    Allergies   Allergen Reactions    Augmentin [Amoxicillin-Pot Clavulanate] Hives    Codeine Syncope    Erythromycin  "Hives       Social History:     Marital Status:     Substance Use History:   Social History     Substance and Sexual Activity   Alcohol Use Not Currently     Social History     Tobacco Use   Smoking Status Never   Smokeless Tobacco Never     Social History     Substance and Sexual Activity   Drug Use Never       Family History:    History reviewed. No pertinent family history.      OBJECTIVE:    Vitals:   Blood Pressure: (!) 94/47 (08/29/24 1217)  Pulse: 75 (08/29/24 0757)  Temperature: 97.7 °F (36.5 °C) (08/29/24 0757)  Temp Source: Temporal (08/29/24 0757)  Respirations: 16 (08/29/24 0757)  Height: 5' 4\" (162.6 cm) (08/28/24 1317)  Weight - Scale: 84.7 kg (186 lb 11.2 oz) (08/28/24 1317)  SpO2: 98 % (08/29/24 0757)    Physical Exam:    General Appearance: Alert, cooperative, no distress.  HEENT: Head normocephalic, atraumatic, without obvious abnormality.  Heart: Normal rate and rhythm.  Lungs: Non-labored breathing. No respiratory distress.  Abdomen: Without distension.  Psychiatric: AAOx3  Lower Extremity:  Vascular:   Right DP and PT pulses are absent. Left DP and PT pulses are absent. CRT < 3 seconds at the digits. +4/4 edema noted at bilateral lower extremities. Pedal hair is absent. Skin temperature is WNL bilaterally.    Musculoskeletal:  MMT is 4/5 in all muscle compartments bilaterally. ROM at the ankle joint is decreased bilaterally with the leg extended. No Pain on palpation of the bilateral feet. No gross deformities noted.     Dermatological:  Elongated, discolored, dystrophic nails x 10 that are positive for subungual debris.     Patient does have bilateral lower extremity ulcerations, these are being managed by the general surgery team however.    Neurological:  Gross sensation is intact. Protective sensation is intact. Light touch sensation is intact.      Additional data:     Lab Results: I have personally reviewed pertinent labs including:    Results from last 7 days   Lab Units " "08/28/24  0802   WBC Thousand/uL 15.61*   HEMOGLOBIN g/dL 15.0   HEMATOCRIT % 48.3*   PLATELETS Thousands/uL 458*   SEGS PCT % 89*   LYMPHO PCT % 3*   MONO PCT % 7   EOS PCT % 0     Results from last 7 days   Lab Units 08/28/24  0802   POTASSIUM mmol/L 4.7   CHLORIDE mmol/L 102   CO2 mmol/L 22   BUN mg/dL 35*   CREATININE mg/dL 1.16   CALCIUM mg/dL 9.3   ALK PHOS U/L 118*   ALT U/L 21   AST U/L 19           Cultures: I have personally reviewed pertinent cultures including:    Results from last 7 days   Lab Units 08/28/24  1120 08/28/24  0835 08/28/24  0806   BLOOD CULTURE   --  No Growth at 24 hrs. No Growth at 24 hrs.   GRAM STAIN RESULT  3+ Gram positive cocci in clusters*  1+ Gram negative rods*  No polys seen*  --   --    WOUND CULTURE  4+ Growth of Staphylococcus aureus*  --   --            Imaging: I have personally reviewed pertinent reports in PACS.  EKG, Pathology, and Other Studies: I have personally reviewed pertinent reports.        ** Please Note: Portions of the record may have been created with voice recognition software. Occasional wrong word or \"sound a like\" substitutions may have occurred due to the inherent limitations of voice recognition software. Read the chart carefully and recognize, using context, where substitutions have occurred. **   "

## 2024-08-29 NOTE — PROGRESS NOTES
"[facility]  Progress Note  Name: Barbie Medel I  MRN: [id number]  Unit/Bed#: [address] [address] I Date of Admission: 8/28/2024   Date of Service: 8/29/2024 I Hospital Day: 0    Assessment & Plan   * Sepsis (HCC)  Assessment & Plan  Sepsis present on admission evidenced by tachycardia, tachypnea, WBC 15.61.  Lactic acid negative  Suspect source most likely bilateral lower extremity leg wounds  Vancomycin IV provided to patient in ED, follow-up on MRSA swab.  Ancef 2 g IV provided to patient in ER, will continue every 8 hours.  Blood pressure noted to be soft, patient provided with bolus, BP improved manual check by this provider 106/62 this afternoon.  Monitor vitals   Follow-up on blood cultures  Follow-up on wound cultures  Repeat CBC in a.m. and monitor fever curve  Patient reports improvement in the pain in her legs    Multiple open wounds of lower leg  Assessment & Plan  Patient has multiple open wounds of bilateral lower extremities, erythema and drainage noted.  Blood cultures obtained and urine culture sent by ER.  Patient started on IV Ancef, will continue every 8 hours.  Patient received dose of IV vancomycin, follow-up on MRSA swab  Wound care nurse consulted follow-up on recommendations.  Will also consult surgery as wounds appear to have some areas of eschar noted  Will obtain venous duplex and LAM lower extremities as legs have swelling along with the wounds  Venous duplex negative for DVT.  Vascular LAM difficult technical study secondary to severe calf and foot pain and open wounds.  Unable to obtain LAM.  Technician was able to obtain great toe waveform which appeared to be dampened bilaterally  Round-the-clock Tylenol ordered as patient does experience some discomfort  Monitor.    Fall  Assessment & Plan  Patient sustained fall in her bathroom, reported that her legs \"just gave out\".  Denies striking head or loss of consciousness.  She was on the floor for 1-1/2 hours before " she was able to get her phone off of a shelf and call 911.  She was brought to the emergency department for further evaluation and treatment.  In the ED CT of the head was performed which was negative for intra cranial abnormality, chronic microangiopathic changes, significant sinus mucosal disease of maxillary sinuses as per radiology report.  CT of cervical spine was also performed which showed no fracture or malalignment, severe multilevel degenerative changes.  Diminished osseous trabeculation most pronounced in the C6 vertebral body is likely due to low mineral density  CT chest abdomen pelvis was performed showing age-indeterminate minimally displaced anterior lateral right 10th rib fracture, 2.2 cm region of hypoattenuation in the inferior posterior right hepatic lobe favored to represent a prominent diaphragmatic slip (normal anatomy) where his laceration is less likely despite the age indeterminant right rib fracture as per radiology report.  PT/OT evaluation and treatment, recommending short-term rehab at this time  Continue fall precautions  Patient denies rib pain.  PT/OT evaluation and treatment ordered.      Hyponatremia  Assessment & Plan  Mild hyponatremia present on admission as evidenced by sodium of 134.  Repeat BMP in a.m.    Ambulatory dysfunction  Assessment & Plan  Patient reports that she has been using a walker or holding onto furniture when she has been ambulating at home.  As noted above sustained a fall morning of presentation secondary to her legs giving out on her.  Spoke with patient's friend Liyah who indicated that patient is a hoarder, and condition of the house is not in good shape.  PT/OT evaluation and treatment ordered, recommend short-term rehab at this time    Onychomycosis  Assessment & Plan  Noted bilateral feet  Consult podiatry                VTE Pharmacologic Prophylaxis: VTE Score: 6 heparin for pharm DVT prophylaxis; no SCDs due to bilateral LE wounds     Mobility:  "  Basic Mobility Inpatient Raw Score: 10  JH-HLM Goal: 4: Move to chair/commode  JH-HLM Achieved: 6: Walk 10 steps or more  JH-HLM Goal achieved. Continue to encourage appropriate mobility.    Patient Centered Rounds: I performed bedside rounds with nursing staff today.   Discussions with Specialists or Other Care Team Provider: Multidisciplinary team    Education and Discussions with Family / Patient:  Patient indicated that she would update her friend.     Total Time Spent on Date of Encounter in care of patient: Greater than 45 mins. This time was spent on one or more of the following: performing physical exam; counseling and coordination of care; obtaining or reviewing history; documenting in the medical record; reviewing/ordering tests, medications or procedures; communicating with other healthcare professionals and discussing with patient's family/caregivers.    Current Length of Stay: 0 day(s)  Current Patient Status: Inpatient   Certification Statement: The patient will continue to require additional inpatient hospital stay due to IV antibiotics, wound care, surgery consultation, wound care nurse consultation, PT/OT evaluation and treatment  Discharge Plan: Anticipate discharge in 48-72 hrs to discharge location to be determined pending rehab evaluations.    Code Status: Level 1 - Full Code    Subjective:   Patient reports that she feels much better than when she first came in less pain in her legs.  She reports that she slept pretty well last night, good appetite for breakfast no nausea or vomiting.  She states that she is pleased with the staff that everyone has been \"very kind to her\".  No fevers or chills.  Encouraged patient with leg elevation when she is out of bed in the chair.    Objective:     Vitals:   Temp (24hrs), Av.6 °F (36.4 °C), Min:97.4 °F (36.3 °C), Max:97.8 °F (36.6 °C)    Temp:  [97.4 °F (36.3 °C)-97.8 °F (36.6 °C)] 97.7 °F (36.5 °C)  HR:  [73-75] 75  Resp:  [16-20] 16  BP: " ()/(44-56) 94/47  SpO2:  [98 %] 98 %  Body mass index is 32.05 kg/m².     Input and Output Summary (last 24 hours):     Intake/Output Summary (Last 24 hours) at 8/29/2024 1650  Last data filed at 8/29/2024 0617  Gross per 24 hour   Intake 118 ml   Output 175 ml   Net -57 ml       Physical Exam:   Physical Exam  Vitals and nursing note reviewed.   Constitutional:       General: She is not in acute distress.  HENT:      Head: Normocephalic.      Ears:      Comments: Hard of hearing     Nose: Nose normal.      Mouth/Throat:      Mouth: Mucous membranes are moist.   Eyes:      Extraocular Movements: Extraocular movements intact.      Conjunctiva/sclera: Conjunctivae normal.   Cardiovascular:      Rate and Rhythm: Normal rate and regular rhythm.      Pulses: Normal pulses.      Heart sounds: Normal heart sounds.   Pulmonary:      Effort: Pulmonary effort is normal.      Breath sounds: Normal breath sounds.   Abdominal:      General: Bowel sounds are normal. There is no distension.      Palpations: Abdomen is soft.      Tenderness: There is no abdominal tenderness.   Genitourinary:     Comments: Voiding spontaneously   Musculoskeletal:         General: Swelling and tenderness present.      Cervical back: Normal range of motion.      Comments: Bilateral LEs wounds and erythema noted with drainage    Skin:     Capillary Refill: Capillary refill takes less than 2 seconds.      Comments: Bilateral lower extremity dressings present, some drainage noted   Neurological:      General: No focal deficit present.      Mental Status: She is alert and oriented to person, place, and time.   Psychiatric:         Mood and Affect: Mood normal.         Behavior: Behavior normal.         Thought Content: Thought content normal.         Judgment: Judgment normal.          Additional Data:     Labs:  Results from last 7 days   Lab Units 08/28/24  0802   WBC Thousand/uL 15.61*   HEMOGLOBIN g/dL 15.0   HEMATOCRIT % 48.3*   PLATELETS  Thousands/uL 458*   SEGS PCT % 89*   LYMPHO PCT % 3*   MONO PCT % 7   EOS PCT % 0     Results from last 7 days   Lab Units 08/28/24  0802   SODIUM mmol/L 134*   POTASSIUM mmol/L 4.7   CHLORIDE mmol/L 102   CO2 mmol/L 22   BUN mg/dL 35*   CREATININE mg/dL 1.16   ANION GAP mmol/L 10   CALCIUM mg/dL 9.3   ALBUMIN g/dL 3.6   TOTAL BILIRUBIN mg/dL 0.42   ALK PHOS U/L 118*   ALT U/L 21   AST U/L 19   GLUCOSE RANDOM mg/dL 103                 Results from last 7 days   Lab Units 08/28/24  0802   LACTIC ACID mmol/L 2.0       Lines/Drains:  Invasive Devices       Peripheral Intravenous Line  Duration             Peripheral IV 08/28/24 Left Antecubital 1 day    Peripheral IV 08/28/24 Right Antecubital 1 day                          Imaging: Reviewed radiology reports from this admission including: Venous duplex lower extremities, limited arterial studies with toe waveforms    Recent Cultures (last 7 days):   Results from last 7 days   Lab Units 08/28/24  1120 08/28/24  0835 08/28/24  0806   BLOOD CULTURE   --  No Growth at 24 hrs. No Growth at 24 hrs.   GRAM STAIN RESULT  3+ Gram positive cocci in clusters*  1+ Gram negative rods*  No polys seen*  --   --    WOUND CULTURE  4+ Growth of Staphylococcus aureus*  --   --        Last 24 Hours Medication List:   Current Facility-Administered Medications   Medication Dose Route Frequency Provider Last Rate    acetaminophen  650 mg Oral Q6H [facility] LEEROY Monae      cefazolin  2,000 mg Intravenous Q8H LEEROY Monae 2,000 mg (08/29/24 1212)    ergocalciferol  50,000 Units Oral Weekly LEEROY Monae      heparin (porcine)  5,000 Units Subcutaneous Q8H [facility] LEEROY Monae      multi-electrolyte  100 mL/hr Intravenous Continuous LEEROY Monae 100 mL/hr (08/29/24 1132)    nystatin   Topical BID LEEROY Monae      saccharomyces boulardii  250 mg Oral BID LEEROY Monae      sodium  hypochlorite  1 Application Irrigation Daily Laila Pearce MD          Today, Patient Was Seen By: LEEROY Monae    **Please Note: This note may have been constructed using a voice recognition system.**

## 2024-08-29 NOTE — DISCHARGE INSTR - OTHER ORDERS
Plan:  Skin care plans:    1-Cleanse wounds to b/l lower legs with normal saline & pat dry. Apply Prevent barrier cream or equivalent to periwound areas for protection. Apply Dakin's soaked gauze to wounds x 5 minutes, rinse with normal saline & pat dry. Apply Adaptic then Melgisorb Ag (calcium alginate with silver) or equivalent to wounds cut to size, cover with ABD pads, kerlix and tape. Change daily & as needed for soilage/dislodgement.  2-Apply light dusting of Nystatin powder in-between buttocks 2x/day as directed. Gently remove excess to prevent caking.  3-Apply Prevent barrier cream or equivalent to bilateral sacrobuttocks (not in-between folds) and heels 2x/day and as needed.  4-Float heels on 2 pillows to offload pressure so heels are not in contact with mattress/foot of reclining chair or pillows.  5-Use pressure redistribution cushion in chair when out of bed. Please continue to educate patient regarding importance of not sleeping in reclining chair at night and to avoid prolonged sitting during the day.  6-Moisturize skin daily with skin nourishing cream.  7-Turn/reposition every 2 hrs for pressure re-distribution on skin.   8-Encourage/assist patient to elevate legs with sitting/lying in bed as much as possible.  9-Follow up at [facility]. Call Central Scheduling for appointment: [phone].

## 2024-08-29 NOTE — UTILIZATION REVIEW
Initial Clinical Review    Admission: Date/Time/Statement:   Admission Orders (From admission, onward)       Ordered        08/28/24 1142  Place in Observation  Once                          Orders Placed This Encounter   Procedures    Place in Observation     Standing Status:   Standing     Number of Occurrences:   1     Order Specific Question:   Level of Care     Answer:   Med Surg [16]     ED Arrival Information       Expected   -    Arrival   8/28/2024 07:31    Acuity   Urgent              Means of arrival   Ambulance    [facility]    Service   Hospitalist    Admission type   Emergency              Arrival complaint   Wound Check             Chief Complaint   Patient presents with    Fall     Fall in the bathroom this am. Found on floor by ems hour and a half later. Denies head strike or LOC. C/o left knee and lower back pain. Reportedly haven't seen any medical provider for over 10 years, doesn't take any meds. Presents with multiple chronic wounds to BLE.       Initial Presentation:  78 yof to ER from home via EMS s/p unwitnessed fall. Reports unsteady on feet at baseline, felt weak walking to BR, slid to ground, denies head strike. States on floor x 1 1/2 hrs before calling neighbor. Has not seen doctor in 8 yrs. Presents tachycardic, tachypneic, low/soft BP. Circumferential erythema noted to distal aspect of bilateral lower extremity, extending from mid leg to ankles, with multiple blisters. See images below. Admission CT head: Significant sinus mucosal disease of the maxillary sinuses. CT cervical spine: No cervical spine fracture or traumatic malalignment. Severe multilevel degenerative change. Diminished osseous trabeculation most pronounced at the C6 vertebral body is likely due to low mineral density. Labs: WBC 15.61, , Na 134, BUN 35, alk phos 118, CK 25.  Placed in observation status for sepsis 2nd bilateral lower extremity wounds with overlying cellulitis. Started  on IVABT, cultures pending.                                   ED Triage Vitals   Temperature Pulse Respirations Blood Pressure SpO2 Pain Score   08/28/24 0739 08/28/24 0739 08/28/24 0739 08/28/24 0739 08/28/24 0739 08/28/24 1317   98 °F (36.7 °C) (!) 108 (!) 23 111/54 97 % 9     Weight (last 2 days)       Date/Time Weight    08/28/24 1317 84.7 (186.7)    08/28/24 0739 89 (196.21)            Vital Signs (last 3 days)       Date/Time Temp Pulse Resp BP MAP (mmHg) SpO2 O2 Device Patient Position - Orthostatic VS Eliseo Coma Scale Score Pain    08/29/24 0539 -- -- -- -- -- -- -- -- -- 4    08/29/24 0245 97.8 °F (36.6 °C) -- 16 116/56 80 98 % None (Room air) Sitting -- --    08/28/24 2143 97.6 °F (36.4 °C) -- 18 97/52 67 -- -- Sitting -- --    08/28/24 2013 -- -- -- -- -- -- -- -- -- 5    08/28/24 2001 -- -- -- -- -- -- -- -- -- No Pain    08/28/24 1852 97.4 °F (36.3 °C) 73 20 98/44 63 98 % None (Room air) Sitting -- --    08/28/24 1352 -- -- -- -- -- -- -- -- -- 9 08/28/24 1330 -- -- -- -- -- -- -- -- 15 --    08/28/24 1317 97.4 °F (36.3 °C) 92 20 96/63 75 99 % None (Room air) Lying -- 9 08/28/24 1245 -- 88 18 101/45 65 99 % -- -- -- --    08/28/24 1240 -- 87 20 101/45 -- 96 % None (Room air) Lying -- --    08/28/24 1200 -- 90 20 102/46 67 96 % -- -- -- --    08/28/24 0837 -- -- -- -- -- -- -- -- 15 --    08/28/24 0739 98 °F (36.7 °C) 108 23 111/54 -- 97 % None (Room air) Lying -- --              Pertinent Labs/Diagnostic Test Results:   Radiology:  VAS LAM & waveform analysis, multiple levels   Final Interpretation by Violetta Perkins MD (08/28 6177)  Impression  RIGHT LOWER LIMB  Ankle/Brachial Index was not obtained due to severe calf/foot pain and open  wounds.  Triphasic waveforms at the ankle.  Metatarsal Pressure and great toe pressure not obtained due to non-compressible  vessels (may be due to patient position unable to move from seated position).  Great Toe waveform appears dampened.     LEFT LOWER  LIMB  Ankle/Brachial Index was not obtained due to severe calf/foot pain and open  wounds.  Triphasic waveforms at the ankle.  Metatarsal Pressure and great toe pressure not obtained due to non-compressible  vessels (may be due to patient position unable to move from seated position).  Great Toe waveform appears dampened.     Technically limited/unreliable study due to open wounds, severe pain, and  patient position.       VAS VENOUS DUPLEX - LOWER LIMB BILATERAL   Final Interpretation by Violetta Perkins MD (08/28 9063)  Impression:  RIGHT LOWER LIMB:  No gross evidence of acute deep vein thrombosis.  No evidence of superficial thrombophlebitis noted.  Doppler evaluation shows a normal response to augmentation maneuvers.  Popliteal, posterior tibial and anterior tibial arterial Doppler waveform's are  triphasic.     LEFT LOWER LIMB:  No gross evidence of acute deep vein thrombosis.  No evidence of superficial thrombophlebitis noted.  Doppler evaluation shows a normal response to augmentation maneuvers.  Popliteal, posterior tibial and anterior tibial arterial Doppler waveform's are  triphasic.     Technically difficult/limited study due to severe pain, open wounds, and  patient position. Some segments may be poorly visualized on today's exam.      CT head without contrast   Final Interpretation by Naeem Weeks MD (08/28 7845)      1.  No acute intracranial abnormality.   2.  Chronic microangiopathic changes.   3.  Significant sinus mucosal disease of the maxillary sinuses.                  Workstation performed: [id number]         CT cervical spine without contrast   Final Interpretation by Naeem Weeks MD (08/28 1538)      1.  No cervical spine fracture or traumatic malalignment.   2.  Severe multilevel degenerative change.   3.  Diminished osseous trabeculation most pronounced at the C6 vertebral body is likely due to low mineral density.               Workstation performed: [id number]         CT chest abdomen  "pelvis w contrast   Final Interpretation by Naeem Weeks MD (08/28 0947)   1.  Age-indeterminate minimally displaced anterolateral right 10th rib fracture. 2.2 cm region of hypoattenuation in the inferoposterior right hepatic lobe is favored to represent a prominent diaphragmatic slip (normal anatomy), whereas a laceration is    considered less likely despite the slightly more inferior age-indeterminate anterolateral right 10th rib fracture.   2.  Interstitial reticulation in the right lower lobe is likely atelectasis given the elevation of the adjacent diaphragm.                  Workstation performed: [id number]           Cardiology:  ECG 12 lead   Final Result by Linda Hinojosa MD (08/28 0895)   Normal sinus rhythm   Nonspecific ST abnormality   Abnormal ECG   No previous ECGs available   Confirmed by Linda Hinojosa ([id number]) on 8/28/2024 6:45:48 PM        GI:  No orders to display           Results from last 7 days   Lab Units 08/28/24  0802   WBC Thousand/uL 15.61*   HEMOGLOBIN g/dL 15.0   HEMATOCRIT % 48.3*   PLATELETS Thousands/uL 458*   TOTAL NEUT ABS Thousands/µL 13.77*         Results from last 7 days   Lab Units 08/28/24  0802   SODIUM mmol/L 134*   POTASSIUM mmol/L 4.7   CHLORIDE mmol/L 102   CO2 mmol/L 22   ANION GAP mmol/L 10   BUN mg/dL 35*   CREATININE mg/dL 1.16   EGFR ml/min/1.73sq m 45   CALCIUM mg/dL 9.3   MAGNESIUM mg/dL 2.4     Results from last 7 days   Lab Units 08/28/24  0802   AST U/L 19   ALT U/L 21   ALK PHOS U/L 118*   TOTAL PROTEIN g/dL 7.7   ALBUMIN g/dL 3.6   TOTAL BILIRUBIN mg/dL 0.42         Results from last 7 days   Lab Units 08/28/24  0802   GLUCOSE RANDOM mg/dL 103             No results found for: \"BETA-HYDROXYBUTYRATE\"               Results from last 7 days   Lab Units 08/28/24  0802   CK TOTAL U/L 25*     Results from last 7 days   Lab Units 08/28/24  1511 08/28/24  1137 08/28/24  0802   HS TNI 0HR ng/L  --   --  9   HS TNI 2HR ng/L  --  11  --    HSTNI D2 ng/L  --  2  " --    HS TNI 4HR ng/L 12  --   --    HSTNI D4 ng/L 3  --   --              Results from last 7 days   Lab Units 08/28/24  0802   TSH 3RD GENERATON uIU/mL 4.968*         Results from last 7 days   Lab Units 08/28/24  0802   LACTIC ACID mmol/L 2.0                                                 Results from last 7 days   Lab Units 08/28/24  1556   CLARITY UA  Clear   COLOR UA  Yellow   SPEC GRAV UA  1.025   PH UA  5.5   GLUCOSE UA mg/dl Negative   KETONES UA mg/dl Trace*   BLOOD UA  Negative   PROTEIN UA mg/dl 30 (1+)*   NITRITE UA  Negative   BILIRUBIN UA  Negative   UROBILINOGEN UA (BE) mg/dl <2.0   LEUKOCYTES UA  Negative   WBC UA /hpf 0-1   RBC UA /hpf None Seen   BACTERIA UA /hpf Occasional   EPITHELIAL CELLS WET PREP /hpf Occasional   MUCUS THREADS  Occasional*                                 Results from last 7 days   Lab Units 08/28/24  1120 08/28/24  0835 08/28/24  0806   BLOOD CULTURE   --  Received in Microbiology Lab. Culture in Progress. Received in Microbiology Lab. Culture in Progress.   GRAM STAIN RESULT  3+ Gram positive cocci in clusters*  1+ Gram negative rods*  No polys seen*  --   --                    ED Treatment-Medication Administration from 08/28/2024 0731 to 08/28/2024 1302         Date/Time Order Dose Route Action     08/28/2024 0859 iohexol (OMNIPAQUE) 350 MG/ML injection (MULTI-DOSE) 100 mL 100 mL Intravenous Given     08/28/2024 1121 ceFAZolin (ANCEF) IVPB (premix in dextrose) 2,000 mg 50 mL 2,000 mg Intravenous New Bag     08/28/2024 1204 vancomycin (VANCOCIN) 2,000 mg in sodium chloride 0.9 % 500 mL IVPB 2,000 mg Intravenous New Bag            History reviewed. No pertinent past medical history.  Present on Admission:   Fall   Sepsis (HCC)   Multiple open wounds of lower leg   Ambulatory dysfunction   Hyponatremia   Onychomycosis      Admitting Diagnosis: Weakness [R53.1]  Wound infection [T14.8XXA, L08.9]  Multiple rib fractures [S22.49XA]  Bilateral lower leg cellulitis [L03.116,  L03.115]  Ambulatory dysfunction [R26.2]  Age/Sex: 78 y.o. female  Admission Orders:  Cont pulse ox  Consult podiatry  Pt/ot eval & tx  Consult surgery    Wound care:  Site Right    Left   Site Leg   Other wound care instructions Cleanse bilateral leg wounds with NSS then pat dry. Apply Prevent barrier cream to periwound areas for protection. Apply Dakin's soaked gauze to wounds x 5 minutes. Rinse with NSS & pat dry. Apply Dermagran in a single layer cut to size of wounds.       Scheduled Medications:  acetaminophen, 650 mg, Oral, Q6H [facility]  cefazolin, 2,000 mg, Intravenous, Q8H  heparin (porcine), 5,000 Units, Subcutaneous, Q8H [facility]  nystatin, , Topical, BID  saccharomyces boulardii, 250 mg, Oral, BID  sodium hypochlorite, 1 Application, Irrigation, Daily      Continuous IV Infusions:  multi-electrolyte, 75 mL/hr, Intravenous, Continuous      Network Utilization Review Department  ATTENTION: Please call with any questions or concerns to [phone] and carefully listen to the prompts so that you are directed to the right person. All voicemails are confidential.   For Discharge needs, contact Care Management DC Support Team at [phone] opt. 2  Send all requests for admission clinical reviews, approved or denied determinations and any other requests to dedicated fax number below belonging to the [facility] where the patient is receiving treatment. List of dedicated fax numbers for the Facilities:  FACILITY NAME UR FAX NUMBER   ADMISSION DENIALS (Administrative/Medical Necessity) [phone]   DISCHARGE SUPPORT TEAM (NETWORK) [phone]   PARENT CHILD HEALTH (Maternity/NICU/Pediatrics) [phone]   [facility] [phone]   [facility] [phone]   [facility] [phone]   [facility] [phone]   [facility] [phone]   [facility] [phone]   [facility]  [facility] [phone]   [facility] [phone]   [facility] [phone]   [facility] [phone]   [facility] [phone]   [facility] [phone]

## 2024-08-29 NOTE — CONSULTS
Consultation - General Surgery   Barbie Medel 78 y.o. female MRN: [id number]  Unit/Bed#: [address] [place] [address] Encounter: [id number]    Assessment & Plan     Assessment:  77 y/o f with no significant pmh hx presenting with fall at home.Patient found to be septic evidence by tachycardia, tachypnea and WBC 15.61. Surgery team consulted for evaluation bilateral leg wounds.       Plan:  No acute surgical intervention.   Continue local wound per wound care nurse.   Will follow in periphery.   History of Present Illness     HPI:  Barbie Medel is a 78 y.o. female with no significant pmh hx presenting with fall at home.Patient found to be septic evidence by tachycardia, tachypnea and WBC 15.61. Surgery team consulted for evaluation bilateral leg wounds.       Inpatient consult to Acute Care Surgery  Consult performed by: Kong Ellington PA-C  Consult ordered by: LEEROY Monae          Review of Systems   Constitutional:  Negative for chills, fatigue and fever.   HENT:  Negative for congestion, ear pain, hearing loss, postnasal drip, sinus pressure, sinus pain and sore throat.    Eyes:  Negative for pain and discharge.   Respiratory:  Negative for chest tightness and shortness of breath.    Cardiovascular:  Negative for chest pain.   Gastrointestinal:  Negative for abdominal pain, constipation, nausea and vomiting.   Genitourinary:  Negative for difficulty urinating.   Musculoskeletal:  Negative for arthralgias and myalgias.        Fall   Skin:  Positive for wound. Negative for rash.   Neurological:  Negative for dizziness and headaches.   Psychiatric/Behavioral:  Negative for behavioral problems.        Historical Information   History reviewed. No pertinent past medical history.  History reviewed. No pertinent surgical history.  Social History   Social History     Substance and Sexual Activity   Alcohol Use Not Currently     Social History     Substance and Sexual Activity   Drug Use Never  "    E-Cigarette/Vaping    E-Cigarette Use Never User      E-Cigarette/Vaping Substances    Nicotine No     THC No     CBD No     Flavoring No     Other No     Unknown No      Social History     Tobacco Use   Smoking Status Never   Smokeless Tobacco Never     Family History: non-contributory    Meds/Allergies   all current active meds have been reviewed  Allergies   Allergen Reactions    Augmentin [Amoxicillin-Pot Clavulanate] Hives    Codeine Syncope    Erythromycin Hives       Objective   First Vitals:   Blood Pressure: 111/54 (08/28/24 0739)  Pulse: (!) 108 (08/28/24 0739)  Temperature: 98 °F (36.7 °C) (08/28/24 0739)  Temp Source: Oral (08/28/24 1317)  Respirations: (!) 23 (08/28/24 0739)  Height: 5' 4\" (162.6 cm) (08/28/24 1317)  Weight - Scale: 89 kg (196 lb 3.4 oz) (08/28/24 0739)  SpO2: 97 % (08/28/24 0739)    Current Vitals:   Blood Pressure: (!) 85/47 (08/29/24 0757)  Pulse: 75 (08/29/24 0757)  Temperature: 97.7 °F (36.5 °C) (08/29/24 0757)  Temp Source: Temporal (08/29/24 0757)  Respirations: 16 (08/29/24 0757)  Height: 5' 4\" (162.6 cm) (08/28/24 1317)  Weight - Scale: 84.7 kg (186 lb 11.2 oz) (08/28/24 1317)  SpO2: 98 % (08/29/24 0757)      Intake/Output Summary (Last 24 hours) at 8/29/2024 0919  Last data filed at 8/29/2024 0617  Gross per 24 hour   Intake 408 ml   Output 175 ml   Net 233 ml       Invasive Devices       Peripheral Intravenous Line  Duration             Peripheral IV 08/28/24 Left Antecubital 1 day    Peripheral IV 08/28/24 Right Antecubital 1 day                    Physical Exam  Constitutional:       Appearance: Normal appearance.   HENT:      Head: Normocephalic and atraumatic.      Mouth/Throat:      Mouth: Mucous membranes are moist.   Eyes:      Extraocular Movements: Extraocular movements intact.   Cardiovascular:      Rate and Rhythm: Normal rate.   Pulmonary:      Effort: Pulmonary effort is normal. No respiratory distress.   Musculoskeletal:      Right lower leg: Edema present. " "     Left lower leg: Edema present.   Skin:     Capillary Refill: Capillary refill takes less than 2 seconds.      Comments: Bilateral lower extremity bandage intact.    Neurological:      Mental Status: She is alert.   Psychiatric:         Mood and Affect: Mood normal.                       Lab Results: I have personally reviewed pertinent lab results.  , CBC: No results found for: \"WBC\", \"HGB\", \"HCT\", \"MCV\", \"PLT\", \"ADJUSTEDWBC\", \"RBC\", \"MCH\", \"MCHC\", \"RDW\", \"MPV\", \"NRBC\", CMP: No results found for: \"SODIUM\", \"K\", \"CL\", \"CO2\", \"ANIONGAP\", \"BUN\", \"CREATININE\", \"GLUCOSE\", \"CALCIUM\", \"AST\", \"ALT\", \"ALKPHOS\", \"PROT\", \"BILITOT\", \"EGFR\"  Imaging: I have personally reviewed pertinent reports.   and I have personally reviewed pertinent films in PACS  EKG, Pathology, and Other Studies: I have personally reviewed pertinent reports.   and I have personally reviewed pertinent films in PACS    Counseling / Coordination of Care  Total floor / unit time spent today 30 minutes.  Greater than 50% of total time was spent with the patient and / or family counseling and / or coordination of care.  A description of the counseling / coordination of care: obtaining history, performing physical exam, reviewing pertinent labs imaging, discussing case with attending.    .      "

## 2024-08-29 NOTE — ASSESSMENT & PLAN NOTE
Patient reports that she has been using a walker or holding onto furniture when she has been ambulating at home.  As noted above sustained a fall morning of presentation secondary to her legs giving out on her.  Spoke with patient's friend Liyah who indicated that patient is a hoarder, and condition of the house is not in good shape.  PT/OT evaluation and treatment ordered, recommend short-term rehab at this time

## 2024-08-29 NOTE — ASSESSMENT & PLAN NOTE
Sepsis present on admission evidenced by tachycardia, tachypnea, WBC 15.61.  Lactic acid negative  Suspect source most likely bilateral lower extremity leg wounds  Vancomycin IV provided to patient in ED, follow-up on MRSA swab.  Ancef 2 g IV provided to patient in ER, will continue every 8 hours.  Blood pressure noted to be soft, patient provided with bolus, BP improved manual check by this provider 106/62 this afternoon.  Monitor vitals   Follow-up on blood cultures  Follow-up on wound cultures  Repeat CBC in a.m. and monitor fever curve  Patient reports improvement in the pain in her legs

## 2024-08-29 NOTE — ASSESSMENT & PLAN NOTE
Patient has multiple open wounds of bilateral lower extremities, erythema and drainage noted.  Blood cultures obtained and urine culture sent by ER.  Patient started on IV Ancef, will continue every 8 hours.  Patient received dose of IV vancomycin, follow-up on MRSA swab  Wound care nurse consulted follow-up on recommendations.  Will also consult surgery as wounds appear to have some areas of eschar noted  Will obtain venous duplex and LAM lower extremities as legs have swelling along with the wounds  Venous duplex negative for DVT.  Vascular LAM difficult technical study secondary to severe calf and foot pain and open wounds.  Unable to obtain LAM.  Technician was able to obtain great toe waveform which appeared to be dampened bilaterally  Round-the-clock Tylenol ordered as patient does experience some discomfort  Monitor.

## 2024-08-29 NOTE — PLAN OF CARE
Problem: Potential for Falls  Goal: Patient will remain free of falls  Description: INTERVENTIONS:  - Educate patient/family on patient safety including physical limitations  - Instruct patient to call for assistance with activity   - Consult OT/PT to assist with strengthening/mobility   - Keep Call bell within reach  - Keep bed low and locked with side rails adjusted as appropriate  - Keep care items and personal belongings within reach  - Initiate and maintain comfort rounds  - Make Fall Risk Sign visible to staff  - Offer Toileting every 2 Hours, in advance of need  - Initiate/Maintain bed/chair   Problem: PAIN - ADULT  Goal: Verbalizes/displays adequate comfort level or baseline comfort level  Description: Interventions:  - Encourage patient to monitor pain and request assistance  - Assess pain using appropriate pain scale  - Administer analgesics based on type and severity of pain and evaluate response  - Implement non-pharmacological measures as appropriate and evaluate response  - Consider cultural and social influences on pain and pain management  - Notify physician/advanced practitioner if interventions unsuccessful or patient reports new pain  Outcome: Progressing     Problem: INFECTION - ADULT  Goal: Absence or prevention of progression during hospitalization  Description: INTERVENTIONS:  - Assess and monitor for signs and symptoms of infection  - Monitor lab/diagnostic results  - Monitor all insertion sites, i.e. indwelling lines, tubes, and drains  - Monitor endotracheal if appropriate and nasal secretions for changes in amount and color  - [place] appropriate cooling/warming therapies per order  - Administer medications as ordered  - Instruct and encourage patient and family to use good hand hygiene technique  - Identify and instruct in appropriate isolation precautions for identified infection/condition  Outcome: Progressing  Goal: Absence of fever/infection during neutropenic period  Description:  INTERVENTIONS:  - Monitor WBC    Outcome: Progressing     Problem: SAFETY ADULT  Goal: Patient will remain free of falls  Description: INTERVENTIONS:  - Educate patient/family on patient safety including physical limitations  - Instruct patient to call for assistance with activity   - Consult OT/PT to assist with strengthening/mobility   - Keep Call bell within reach  - Keep bed low and locked with side rails adjusted as appropriate  - Keep care items and personal belongings within reach  - Initiate and maintain comfort rounds  - Make Fall Risk Sign visible to staff  - Offer Toileting every 2  Problem: DISCHARGE PLANNING  Goal: Discharge to home or other facility with appropriate resources  Description: INTERVENTIONS:  - Identify barriers to discharge w/patient and caregiver  - Arrange for needed discharge resources and transportation as appropriate  - Identify discharge learning needs (meds, wound care, etc.)  - Arrange for interpretive services to assist at discharge as needed  - Refer to Case Management Department for coordinating discharge planning if the patient needs post-hospital services based on physician/advanced practitioner order or complex needs related to functional status, cognitive ability, or social support system  Outcome: Progressing     Problem: Knowledge Deficit  Goal: Patient/family/caregiver demonstrates understanding of disease process, treatment plan, medications, and discharge instructions  Description: Complete learning assessment and assess knowledge base.  Interventions:  - Provide teaching at level of understanding  - Provide teaching via preferred learning methods  Outcome: Progressing     Problem: METABOLIC, FLUID AND ELECTROLYTES - ADULT  Goal: Electrolytes maintained within normal limits  Description: INTERVENTIONS:  - Monitor labs and assess patient for signs and symptoms of electrolyte imbalances  - Administer electrolyte replacement as ordered  - Monitor response to electrolyte  replacements, including repeat lab results as appropriate  - Instruct patient on fluid and nutrition as appropriate  Outcome: Progressing  Goal: Fluid balance maintained  Description: INTERVENTIONS:  - Monitor labs   - Monitor I/O and WT  - Instruct patient on fluid and nutrition as appropriate  - Assess for signs & symptoms of volume excess or deficit  Outcome: Progressing  Goal: Glucose maintained within target range  Description: INTERVENTIONS:  - Monitor Blood Glucose as ordered  - Assess for signs and symptoms of hyperglycemia and hypoglycemia  - Administer ordered medications to maintain glucose within target range  - Assess nutritional intake and initiate nutrition service referral as needed  Outcome: Progressing     Problem: HEMATOLOGIC - ADULT  Goal: Maintains hematologic stability  Description: INTERVENTIONS  - Assess for signs and symptoms of bleeding or hemorrhage  - Monitor labs  - Administer supportive blood products/factors as ordered and appropriate  Outcome: Progressing     Problem: MUSCULOSKELETAL - ADULT  Goal: Maintain or return mobility to safest level of function  Description: INTERVENTIONS:  - Assess patient's ability to carry out ADLs; assess patient's baseline for ADL function and identify physical deficits which impact ability to perform ADLs (bathing, care of mouth/teeth, toileting, grooming, dressing, etc.)  - Assess/evaluate cause of self-care deficits   - Assess range of motion  - Assess patient's mobility  - Assess patient's need for assistive devices and provide as appropriate  - Encourage maximum independence but intervene and supervise when necessary  - Involve family in performance of ADLs  - Assess for home care needs following discharge   - Consider OT consult to assist with ADL evaluation and planning for discharge  - Provide patient education as appropriate  Outcome: Progressing  Goal: Maintain proper alignment of affected body part  Description: INTERVENTIONS:  - Support,  maintain and protect limb and body alignment  - Provide patient/ family with appropriate education  Outcome: Progressing     Problem: Prexisting or High Potential for Compromised Skin Integrity  Goal: Skin integrity is maintained or improved  Description: INTERVENTIONS:  - Identify patients at risk for skin breakdown  - Assess and monitor skin integrity  - Assess and monitor nutrition and hydration status  - Monitor labs   - Assess for incontinence   - Turn and reposition patient  - Assist with mobility/ambulation  - Relieve pressure over bony prominences  - Avoid friction and shearing  - Provide appropriate hygiene as needed including keeping skin clean and dry  - Evaluate need for skin moisturizer/barrier cream  - Collaborate with interdisciplinary team   - Patient/family teaching  - Consider wound care consult   Outcome: Progressing    Hours, in advance of need  - Initiate/Maintain bed/chair alarm  - Apply yellow socks and bracelet for high fall risk patients  - Consider moving patient to room near nurses station  Outcome: Progressing  Goal: Maintain or return to baseline ADL function  Description: INTERVENTIONS:  -  Assess patient's ability to carry out ADLs; assess patient's baseline for ADL function and identify physical deficits which impact ability to perform ADLs (bathing, care of mouth/teeth, toileting, grooming, dressing, etc.)  - Assess/evaluate cause of self-care deficits   - Assess range of motion  - Assess patient's mobility; develop plan if impaired  - Assess patient's need for assistive devices and provide as appropriate  - Encourage maximum independence but intervene and supervise when necessary  - Involve family in performance of ADLs  - Assess for home care needs following discharge   - Consider OT consult to assist with ADL evaluation and planning for discharge  - Provide patient education as appropriate  Outcome: Progressing  Goal: Maintains/Returns to pre admission functional  level  Description: INTERVENTIONS:  - Perform AM-PAC 6 Click Basic Mobility/ Daily Activity assessment daily.  - Set and communicate daily mobility goal to care team and patient/family/caregiver.   - Collaborate with rehabilitation services on mobility goals if consulted  - Reposition patient every 2 hours.  - Ambulate patient as tolerated  - Out of bed to chair 3 times a day   - Out of bed for meals 3 times a day  - Out of bed for toileting  - Record patient progress and toleration of activity level   Outcome: Progressing   alarm  - Obtain necessary fall risk management equipment: socks  - Apply yellow socks and bracelet for high fall risk patients  - Consider moving patient to room near nurses station  Outcome: Progressing

## 2024-08-29 NOTE — WOUND OSTOMY CARE
Progress Note - Wound   Barbie Medel 78 y.o. female MRN: [id number]  Unit/Bed#: [address] [place] [address] Encounter: [id number]      Assessment:   This is a 78 year old female patient admitted today with sepsis. She is extremely hard of hearing but was agreeable to have wound visit done. No record of output since admission today. Patient was sitting in reclining chair during visit with arterial studies in progress. Patient stated that she will not sleep in the bed at night as she sleeps in chair at home.    Assessment Findings:  1-Full thickness wounds to bilateral lower legs most likely due to venous stasis. Drainage did not appear to be infected but periwound areas are erythematous with concern for infection. Arterial studies done today showed that patient does not have arterial disease as per technician. Orders in place for wound care.  2-Sacrobuttocks intact - MASD to intergluteal cleft - orders in place for prevention.  3-Bilateral heels intact - orders in place for skin care and for prevention.    Plan:  Skin care plans:  1-Cleanse wounds to b/l lower legs with NSS & pat dry. Apply Prevent barrier cream to periwound areas for protection. Apply Dakin's soaked gauze to wounds x 5 minutes, rinse with NSS & pat dry. Apply Dermagran to wounds cut to size, cover with ABD pads, kerlix and tape. Change daily & prn soilage/dislodgement.  2-Apply light dusting of Nystatin powder to intergluteal cleft 2x/day as directed. Gently remove excess to prevent caking.  3-Apply Prevent barrier cream to bilateral sacrobuttocks (not in-between folds) and heels BID and PRN  4-Float heels on 2 pillows to offload pressure so heels are not in contact with mattress or pillows.  5-Ehob pressure redistribution cushion in chair when out of bed. Please continue to educate patient regarding importance of not sleeping in reclining chair at night and to avoid prolonged sitting during the day.  6-Moisturize skin daily with skin nourishing  cream.  7-Turn/reposition q2h or when medically stable for pressure re-distribution on skin.   8-Encourage/assist patient to elevate legs with sitting/lying in bed as much as possible.    Wound 08/28/24 Pretibial Right (Active)   Wound Image     08/28/24 1700   Wound Description Granulation tissue;Pink;Slough;Yellow;Tan 08/28/24 1700   Gwendolyn-wound Assessment Edema;Erythema 08/28/24 1700   Wound Length (cm) 26 cm 08/28/24 1700   Wound Width (cm) 21 cm 08/28/24 1700   Wound Depth (cm) 0.1 cm 08/28/24 1700   Wound Surface Area (cm^2) 546 cm^2 08/28/24 1700   Wound Volume (cm^3) 54.6 cm^3 08/28/24 1700   Calculated Wound Volume (cm^3) 54.6 cm^3 08/28/24 1700   Drainage Amount Moderate 08/28/24 1700   Drainage Description Serosanguineous 08/28/24 1700   Treatments Cleansed 08/28/24 1700   Dressing Dermagran gauze;ABD;Dry dressing 08/28/24 1700   Dressing Changed New 08/28/24 1700   Dressing Status Clean;Dry;Intact 08/28/24 1700       Wound 08/28/24 Pretibial Left (Active)   Wound Image    08/28/24 1700   Wound Description Granulation tissue;Pink;Slough;Tan;Yellow 08/28/24 1700   Gwendolyn-wound Assessment Edema;Erythema 08/28/24 1700   Wound Length (cm) 13 cm 08/28/24 1700   Wound Width (cm) 13 cm 08/28/24 1700   Wound Depth (cm) 0.1 cm 08/28/24 1700   Wound Surface Area (cm^2) 169 cm^2 08/28/24 1700   Wound Volume (cm^3) 16.9 cm^3 08/28/24 1700   Calculated Wound Volume (cm^3) 16.9 cm^3 08/28/24 1700   Drainage Amount Moderate 08/28/24 1700   Drainage Description Serosanguineous 08/28/24 1700   Treatments Cleansed 08/28/24 1700   Dressing Dermagran gauze;ABD;Dry dressing 08/28/24 1700   Dressing Changed New 08/28/24 1700   Dressing Status Clean;Dry;Intact 08/28/24 1700       Wound 08/28/24 Sacrum (Active)   Wound Image   08/28/24 1700   Wound Description Intact 08/28/24 1700   Drainage Amount None 08/28/24 1700   Treatments Cleansed 08/28/24 1700   Dressing Protective barrier (not inbetween skin folds). Nystatin powder  ordered for MASD/fungal rash 08/28/24 1700   Dressing Changed New 08/28/24 1700   Dressing Status Intact 08/28/24 1700     Discussed assessment findings, and plan of care/recommendations with Mayte CASTANEDA.    Wound care will follow along with patient throughout admission, please call or text with questions and concerns.    Recommendations written as orders.  Johanna Cartagena MSN, RN, CWON

## 2024-08-29 NOTE — ASSESSMENT & PLAN NOTE
"Patient sustained fall in her bathroom, reported that her legs \"just gave out\".  Denies striking head or loss of consciousness.  She was on the floor for 1-1/2 hours before she was able to get her phone off of a shelf and call 911.  She was brought to the emergency department for further evaluation and treatment.  In the ED CT of the head was performed which was negative for intra cranial abnormality, chronic microangiopathic changes, significant sinus mucosal disease of maxillary sinuses as per radiology report.  CT of cervical spine was also performed which showed no fracture or malalignment, severe multilevel degenerative changes.  Diminished osseous trabeculation most pronounced in the C6 vertebral body is likely due to low mineral density  CT chest abdomen pelvis was performed showing age-indeterminate minimally displaced anterior lateral right 10th rib fracture, 2.2 cm region of hypoattenuation in the inferior posterior right hepatic lobe favored to represent a prominent diaphragmatic slip (normal anatomy) where his laceration is less likely despite the age indeterminant right rib fracture as per radiology report.  PT/OT evaluation and treatment, recommending short-term rehab at this time  Continue fall precautions  Patient denies rib pain.  PT/OT evaluation and treatment ordered.    "

## 2024-08-29 NOTE — PLAN OF CARE
Problem: OCCUPATIONAL THERAPY ADULT  Goal: Performs self-care activities at highest level of function for planned discharge setting.  See evaluation for individualized goals.  Description: Treatment Interventions: ADL retraining, Functional transfer training, UE strengthening/ROM, Endurance training, Patient/family training, Equipment evaluation/education, Compensatory technique education, Continued evaluation, Energy conservation, Activityengagement          See flowsheet documentation for full assessment, interventions and recommendations.   Note: Limitation: Decreased ADL status, Decreased UE strength, Decreased endurance, Decreased self-care trans, Decreased high-level ADLs (impaired pain, activity tolerance, standing tolerance, balance, LE edema, forward functional reach, insight into deficits)     Assessment: Pt is a 79yo female admitted [facility] on [facility]/28/24 from home following a fall. Diagnosed w/ sepsis, multiple open wounds B LE. Pt reports living alone in 2 SH w/ 6 [address]IRA. Pt reports maintaining a first floor set-up and sleeps in recliner chair. Pt needs assistance w/ IADLs and reports difficulty caring for herself at home. Personal and environmental factors supporting performance includes supportive friends, able to maintain first floor set-up; barriers include medical non-compliance, insight into deficits, fall history, lives alone, inability to complete IADLs, inability to manage stairs, difficulty completing ADLs. Upon eval, pt alert and oriented. Able to follow directions and participate in conversation w/ + time due to hard of hearing. Pt required min A to complete grooming, min A UBD, max A LBD, max A toileting using commode, mod A sit <> stand, mod A using RW short distance functional mobility. Pt is completing ADLs below baseline level of I w/ decreased activity tolerance, impaired pain, decreased standing tolerance, decreased LE strength, impaired forward functional reach. Recommend level II rehab  resource intensity when medically stable for discharge from acute care. Will continue to follow 3-5X week to achieve highest level of function     Rehab Resource Intensity Level, OT: II (Moderate Resource Intensity)

## 2024-08-29 NOTE — OCCUPATIONAL THERAPY NOTE
Occupational Therapy Evaluation     Patient Name: Barbie Medel  Today's Date: 8/29/2024  Problem List  Principal Problem:    Sepsis (HCC)  Active Problems:    Fall    Multiple open wounds of lower leg    Ambulatory dysfunction    Hyponatremia    Onychomycosis    Past Medical History  History reviewed. No pertinent past medical history.  Past Surgical History  History reviewed. No pertinent surgical history.        08/29/24 1242   OT Last Visit   OT Visit Date 08/29/24  (Thursday)   Note Type   Note type Evaluation  (Eval [id number]; tx [id number])   Additional Comments Identified pt by full name and wristband   Pain Assessment   Pain Assessment Tool FLACC   Pain Location/Orientation Orientation: Mid;Orientation: Left;Location: Chest;Location: Rib Cage   Effect of Pain on Daily Activities limits standing tolerance and I w/ functional transfers   Patient's Stated Pain Goal No pain   Hospital Pain Intervention(s) Repositioned;Ambulation/increased activity;Emotional support   Pain Rating: FLACC (Rest) - Face 0   Pain Rating: FLACC (Rest) - Legs 0   Pain Rating: FLACC (Rest) - Activity 0   Pain Rating: FLACC (Rest) - Cry 0   Pain Rating: FLACC (Rest) - Consolability 0   Score: FLACC (Rest) 0   Pain Rating: FLACC (Activity) - Face 1   Pain Rating: FLACC (Activity) - Legs 1   Pain Rating: FLACC (Activity) - Activity 1   Pain Rating: FLACC (Activity) - Cry 1   Pain Rating: FLACC (Activity) - Consolability 1   Score: FLACC (Activity) 5   Restrictions/Precautions   Weight Bearing Precautions Per Order No   Braces or Orthoses Other (Comment)  (dressing distal B LE wounds)   Other Precautions Bed Alarm;Multiple lines;Fall Risk;Pain;Hard of hearing  ([place], room air)   Home Living   Type of Home House  (6 SAIRA)   Home Layout Performs ADLs on one level;Able to live on main level with bedroom/bathroom;Two level  (pt does not go uptsairs or downstairs; does not leave the house)   Bathroom Shower/Tub Walk-in shower   Bathroom  "Toilet Standard   Bathroom Equipment Toilet raiser   Bathroom Accessibility Accessible   Additional Comments Pt reports living alone in 2 SH and maintains first floor set- up. Pt reports sleeping in recliner chair   Prior Function   Level of [place] Needs assistance with IADLS   Lives With (S)  Alone   Receives Help From Friend(s);Other (Comment)  (Rastafarian)   IADLs Family/Friend/Other provides transportation;Family/Friend/Other provides meals;Family/Friend/Other provides medication management   Falls in the last 6 months 1 to 4  (1 leading to admission)   Vocational Retired   Comments Pt reports difficulty caring for herself at home; sleeps in recliner chair. Pt reports sponge bathing at baseline. Pt added that she orders food from [employer] and Rastafarian assists   Lifestyle   Autonomy Pt reports needing assistance w/ IADLs   Reciprocal Relationships Pt reports living alone. 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 Diagnosed w/ sepsis. CT chest/ abdomen/pelvis impression: Minimally displaced fracture of the anterolateral right 10th rib, Chronic appearing posterolateral right 5th and 6th rib fractures, Chronic fractures of the lateral left 6th and 7th ribs.   Family/Caregiver Present Yes  (friendLiyah upon therapist arrival)   Subjective   Subjective \"I am not sure that I can live alone anymore\"   ADL   Where Assessed Chair  (vs commode)   Eating Assistance 7  Independent   Eating Deficit Setup   Grooming Assistance 4  Minimal Assistance   Grooming Deficit Setup;Supervision/safety;Increased time to complete;Verbal cueing  (seated on commode to comb hair; assist to manage knots / tangles back of head)   UB Bathing Assistance Unable to assess   LB " Bathing Assistance Unable to assess   UB Dressing Assistance 4  Minimal Assistance   UB Dressing Deficit Setup;Pull around back   LB Dressing Assistance 2  Maximal Assistance   LB Dressing Deficit Don/doff R sock;Don/doff L sock;Setup;Requires assistive device for steadying;Steadying   Toileting Assistance  2  Maximal Assistance   Toileting Deficit Setup;Bedside commode;Perineal hygiene;Increased time to complete;Verbal cueing;Supervison/safety   Bed Mobility   Supine to Sit Unable to assess   Sit to Supine Unable to assess   Additional Comments Pt seated OOB in bedside recliner chair upon therapist arrival and post eval w/ needs met, call bell in reach w/ LE elevated on stool w/ wedges. Pt unable to tolerate elevation using leg rests   Transfers   Sit to Stand 3  Moderate assistance   Additional items Assist x 1;Bedrails;Armrests;Increased time required;Verbal cues   Stand to Sit 3  Moderate assistance   Additional items Assist x 1;Bedrails;Armrests;Increased time required;Verbal cues   Toilet transfer 3  Moderate assistance   Additional items Assist x 1;Armrests;Bedrails;Increased time required;Commode   Additional Comments Pt required + time to complete sit <> stand and assist to place UE on walker   Functional Mobility   Functional Mobility 3  Moderate assistance   Additional Comments Shuffling, narrow gait using RW few feet chair <> commode   Additional items Rolling walker   Balance   Static Sitting Fair +   Static Standing Poor   Ambulatory Poor   Activity Tolerance   Activity Tolerance Patient limited by pain;Patient limited by fatigue   Medical Staff Made Aware spoke Cammy Nieves; Char JUDGE; PTAbby   Nurse Made Aware spoke w/ Kadi CASTANEDA pre /post eval   RUE Assessment   RUE Assessment   (observed during ADLs; generalized weakness /deconditioning)   RUE Strength   RUE Overall Strength Within Functional Limits - able to perform ADL tasks with strength   LUE Assessment   LUE Assessment   (observed during  ADLs; generalized weakness / deconditioning)   LUE Strength   LUE Overall Strength Within Functional Limits - able to perform ADL tasks with strength   Hand Function   Gross Motor Coordination Functional   Fine Motor Coordination Functional   Vision-Basic Assessment   Current Vision Wears glasses only for reading   Cognition   Overall Cognitive Status WFL   Arousal/Participation Alert;Cooperative   Attention Attends with cues to redirect  (+ [place])   Orientation Level Oriented X4   Memory   (appears WFL during conversation w/ + time due to hard of hearing)   Following Commands Follows multistep commands with increased time or repetition   Comments Alert, agreeable to participate and able to follow directions w/ minimal distractions/ + volume due to hard of hearing   Assessment   Limitation Decreased ADL status;Decreased UE strength;Decreased endurance;Decreased self-care trans;Decreased high-level ADLs  (impaired pain, activity tolerance, standing tolerance, balance, LE edema, forward functional reach, insight into deficits)   Assessment Pt is a 77yo female [facility] from home following a fall. Diagnosed w/ sepsis, multiple open wounds B LE. Pt reports living alone in 2  w/ 6 SAIRA. Pt reports maintaining a first floor set-up and sleeps in recliner chair. Pt needs assistance w/ IADLs and reports difficulty caring for herself at home. Personal and environmental factors supporting performance includes supportive friends, able to maintain first floor set-up; barriers include medical non-compliance, insight into deficits, fall history, lives alone, inability to complete IADLs, inability to manage stairs, difficulty completing ADLs. Upon eval, pt alert and oriented. Able to follow directions and participate in conversation w/ + time due to hard of hearing. Pt required min A to complete grooming, min A UBD, max A LBD, max A toileting using commode, mod A sit <> stand, mod A using RW short distance  functional mobility. Pt is completing ADLs below baseline level of I w/ decreased activity tolerance, impaired pain, decreased standing tolerance, decreased LE strength, impaired forward functional reach. Recommend level II rehab resource intensity when medically stable for discharge from acute care. Will continue to follow 3-5X week to achieve highest level of function   Goals   Patient Goals Pt stated that she is not sure if she will be able to return home alone.   Plan   Treatment Interventions ADL retraining;Functional transfer training;UE strengthening/ROM;Endurance training;Patient/family training;Equipment evaluation/education;Compensatory technique education;Continued evaluation;Energy conservation;Activityengagement   Goal Expiration Date 09/12/24   OT Treatment Day 0  (Thrusday 8/29/24)   OT Frequency 3-5x/wk   Discharge Recommendation   Rehab Resource Intensity Level, OT II (Moderate Resource Intensity)   AM-PAC Daily Activity Inpatient   Lower Body Dressing 2   Bathing 2   Toileting 2   Upper Body Dressing 3   Grooming 3   Eating 4   Daily Activity Raw Score 16   Daily Activity Standardized Score (Calc for Raw Score >=11) 35.96   AM-PAC Applied Cognition Inpatient   Following a Speech/Presentation 3   Understanding Ordinary Conversation 4   Taking Medications 3   Remembering Where Things Are Placed or Put Away 4   Remembering List of 4-5 Errands 3   Taking Care of Complicated Tasks 3   Applied Cognition Raw Score 20   Applied Cognition Standardized Score 41.76   Barthel Index   Feeding 10   Bathing 0   Grooming Score 0   Dressing Score 5   Bladder Score 5   Bowels Score 10   Toilet Use Score 5   Transfers (Bed/Chair) Score 5   Mobility (Level Surface) Score 0   Stairs Score 0   Barthel Index Score 40   Additional Treatment Session   Start Time 1231   End Time 1242   Treatment Assessment Pt seen for skilled OT tx session day 1 following eval. Pt agreeable and motivated to participate reporting she would  like a haribrush. Therapist educated pt on use of shampoo cap and comb. Pt motivated to wash her hair. Pt seated OOB in bedside recliner chair. Pt required min A w/ + time and cues to wash her hair while seated using shampoo cap. Pt required 1 rest break using B UE at shoulder height to massage head. Pt required min <> mod A to comb hair while seated. Continue to recommend level II rehab resource intensity when medically stable for discharge from acute care. Will continue to follow   Additional Treatment Day 1  (Thursday 8/29/24)   End of Consult   Education Provided Yes  (role of OT)   Patient Position at End of Consult All needs within reach;Bedside chair   Nurse Communication Nurse aware of consult   Licensure   [place] License Number  Kimberlyn ROLANDOJean Fareedjacoby OTR/L [id number]         The patient's raw score on the AM-PAC Daily Activity Inpatient Short Form is 16. A raw score of less than 19 suggests the patient may benefit from discharge to post-acute rehabilitation services. Please refer to the recommendation of the Occupational Therapist for safe discharge planning.      Pt goals to be met by 9/12/24 to max I w/ ADLs and improve engagement to return home alone includes:    -Pt will complete functional transfers to bed, chair, and toilet using LRAD, DME as needed w/ mod I    -Pt will consistently engage in functional mobility using LRAD household distances w/ mod I    -Pt will complete LBD w/ S using LHAE as needed    -Pt will complete UBD w/ mod I seated unsupported w/ at least fair + balance    -Pt will demonstrate improved functional standing tolerance using LRAD w/ at least fair balance while engaged in grooming w/ S to max I w/ light IADLs to return home alone      STG to be met in 3-5 days to max I w/ ADLs and assist w/ DC Planning includes:    -Pt will demonstrate good attention and participation in ongoing eval of functional cognition / health literacy to assist in DC planning    -Pt will consistently follow  multi step directions during ADLs w/ good recall    -Pt will complete functional transfers to bed, chair, and toilet using LRAD, DME as needed w/ min A to minimize burden of care    -Pt will complete LBD w/ mod A    -Pt will demonstrate improved AMPAC score to at least 19 to max I w/ ADLs and assist w/ DC Planning    -Pt will demonstrate improved activity tolerance to participate in ADLs vs sustained leisure tasks for at least 15 minutes w/ out rest break or signs /symptoms of fatigue    -Pt will consistently engage in functional mobility to / from bathroom using LRAD w/ min A to max I and minimize burden of care    -Pt will complete grooming w/ mod I seated    -Pt will complete UBD w/ S    Kimberlyn Hood, OTR/L  [id number]  [id number]

## 2024-08-29 NOTE — PLAN OF CARE
Problem: DISCHARGE PLANNING  Goal: Discharge to home or other facility with appropriate resources  Description: INTERVENTIONS:  - Identify barriers to discharge w/patient and caregiver  - Arrange for needed discharge resources and transportation as appropriate  - Identify discharge learning needs (meds, wound care, etc.)  - Arrange for interpretive services to assist at discharge as needed  - Refer to Case Management Department for coordinating discharge planning if the patient needs post-hospital services based on physician/advanced practitioner order or complex needs related to functional status, cognitive ability, or social support system  Outcome: Progressing     Problem: Knowledge Deficit  Goal: Patient/family/caregiver demonstrates understanding of disease process, treatment plan, medications, and discharge instructions  Description: Complete learning assessment and assess knowledge base.  Interventions:  - Provide teaching at level of understanding  - Provide teaching via preferred learning methods  Outcome: Progressing     Problem: METABOLIC, FLUID AND ELECTROLYTES - ADULT  Goal: Electrolytes maintained within normal limits  Description: INTERVENTIONS:  - Monitor labs and assess patient for signs and symptoms of electrolyte imbalances  - Administer electrolyte replacement as ordered  - Monitor response to electrolyte replacements, including repeat lab results as appropriate  - Instruct patient on fluid and nutrition as appropriate  Outcome: Progressing  Goal: Fluid balance maintained  Description: INTERVENTIONS:  - Monitor labs   - Monitor I/O and WT  - Instruct patient on fluid and nutrition as appropriate  - Assess for signs & symptoms of volume excess or deficit  Outcome: Progressing  Goal: Glucose maintained within target range  Description: INTERVENTIONS:  - Monitor Blood Glucose as ordered  - Assess for signs and symptoms of hyperglycemia and hypoglycemia  - Administer ordered medications to maintain  glucose within target range  - Assess nutritional intake and initiate nutrition service referral as needed  Outcome: Progressing     Problem: HEMATOLOGIC - ADULT  Goal: Maintains hematologic stability  Description: INTERVENTIONS  - Assess for signs and symptoms of bleeding or hemorrhage  - Monitor labs  - Administer supportive blood products/factors as ordered and appropriate  Outcome: Progressing     Problem: MUSCULOSKELETAL - ADULT  Goal: Maintain or return mobility to safest level of function  Description: INTERVENTIONS:  - Assess patient's ability to carry out ADLs; assess patient's baseline for ADL function and identify physical deficits which impact ability to perform ADLs (bathing, care of mouth/teeth, toileting, grooming, dressing, etc.)  - Assess/evaluate cause of self-care deficits   - Assess range of motion  - Assess patient's mobility  - Assess patient's need for assistive devices and provide as appropriate  - Encourage maximum independence but intervene and supervise when necessary  - Involve family in performance of ADLs  - Assess for home care needs following discharge   - Consider OT consult to assist with ADL evaluation and planning for discharge  - Provide patient education as appropriate  Outcome: Progressing  Goal: Maintain proper alignment of affected body part  Description: INTERVENTIONS:  - Support, maintain and protect limb and body alignment  - Provide patient/ family with appropriate education  Outcome: Progressing     Problem: Prexisting or High Potential for Compromised Skin Integrity  Goal: Skin integrity is maintained or improved  Description: INTERVENTIONS:  - Identify patients at risk for skin breakdown  - Assess and monitor skin integrity  - Assess and monitor nutrition and hydration status  - Monitor labs   - Assess for incontinence   - Turn and reposition patient  - Assist with mobility/ambulation  - Relieve pressure over bony prominences  - Avoid friction and shearing  - Provide  appropriate hygiene as needed including keeping skin clean and dry  - Evaluate need for skin moisturizer/barrier cream  - Collaborate with interdisciplinary team   - Patient/family teaching  - Consider wound care consult   Outcome: Progressing

## 2024-08-29 NOTE — PHYSICAL THERAPY NOTE
PHYSICAL THERAPY EVALUATION/TREATMENT     08/29/24 1420   PT Last Visit   PT Visit Date 08/29/24   Note Type   Note type Evaluation   Pain Assessment   Pain Assessment Tool Nguyen-Baker FACES   Nguyen-Baker FACES Pain Rating 6  (Left knee area)   Restrictions/Precautions   Other Precautions Chair Alarm;Bed Alarm;Fall Risk;Pain;Hard of hearing   Home Living   Type of Home House   Home Layout Two level;Stairs to enter with rails  (6 stairs to enter)   Bathroom Equipment Toilet raiser   Home Equipment Walker;Cane  (Sleeps in a recliner)   Additional Comments Patient reports utilizing a cane prior to admission because the walker is too big to fit into her cramped home.  Patient reports difficulty caring for self recently but having assist of friends and neighbors. patient states never leaving her home   Prior Function   Level of Fate Needs assistance with IADLS   Lives With Alone   Receives Help From Friend(s);Neighbor   IADLs Family/Friend/Other provides transportation;Family/Friend/Other provides meals;Family/Friend/Other provides medication management   Falls in the last 6 months 1 to 4   Comments Patient reports having a fall being on the floor for approximately 1-1/2 hours until she could get to her cell phone   General   Additional Pertinent History Chart reviewed, patient admitted with sepsis.  Patient now presents with bilateral lower extremity cellulitis.  Patient also presents with generalized weakness and resulting gait dysfunction   Family/Caregiver Present No   Cognition   Overall Cognitive Status WFL   Arousal/Participation Cooperative   Attention Attends with cues to redirect   Orientation Level Oriented X4   Following Commands Follows multistep commands with increased time or repetition   Comments Patient is extremely hard of hearing   Subjective   Subjective I am having trouble taking care of myself at home   RLE Assessment   RLE Assessment   (Range of motion within functional limits,  strength 3+/5)   LLE Assessment   LLE Assessment   (Range of motion within functional limits, strength 3 -/5)   Coordination   Movements are Fluid and Coordinated 0   Coordination and Movement Description Decreased coordination with pain in the left knee.  Coordination decreased with transfers and lower extremity exercise   Bed Mobility   Additional Comments Patient sitting out of bed in bedside chair upon arrival by therapist   Transfers   Sit to Stand 3  Moderate assistance   Additional items Assist x 1   Stand to Sit 3  Moderate assistance   Additional items Assist x 1   Additional Comments Patient with increased left knee pain due to positioning of legs elevated prior to arrival by therapist.  Unable to assess gait at this time   Balance   Static Sitting Fair   Dynamic Sitting Fair -   Static Standing Poor   Dynamic Standing Poor -   Activity Tolerance   Activity Tolerance Patient limited by fatigue;Patient limited by pain;Treatment limited secondary to medical complications (Comment)   Nurse Made Aware Yes   Assessment   Prognosis Good   Problem List Decreased strength;Decreased range of motion;Decreased endurance;Impaired balance;Decreased mobility;Decreased coordination;Pain;Decreased skin integrity;Impaired hearing   Assessment Patient seen for Physical Therapy evaluation. Patient admitted with Sepsis (HCC).  Comorbidities affecting patient's physical performance include: .  Personal factors affecting patient at time of initial evaluation include: ambulating with assistive device, stairs to enter home, inability to ambulate household distances, inability to navigate community distances, inability to navigate level surfaces without external assistance, inability to perform dynamic tasks in community, limited home support, positive fall history, inability to perform physical activity, inability to perform ADLS, and inability to perform IADLS . Prior to admission, patient was independent with functional  mobility with cane, independent with ADLS, requiring assist for IADLS, living in a multi-level home, and ambulating household distance.  Please find objective findings from Physical Therapy assessment regarding body systems outlined above with impairments and limitations including weakness, decreased ROM, impaired balance, decreased endurance, impaired coordination, gait deviations, pain, decreased activity tolerance, decreased functional mobility tolerance, fall risk, SOB upon exertion, and decreased skin integrity.  The Barthel Index was used as a functional outcome tool presenting with a score of Barthel Index Score: 40 today indicating marked limitations of functional mobility and ADLS.  Patient's clinical presentation is currently unstable/unpredictable as seen in patient's presentation of vital sign response, changing level of pain, increased fall risk, new onset of impairment of functional mobility, decreased endurance, and new onset of weakness. Pt would benefit from continued Physical Therapy treatment to address deficits as defined above and maximize level of functional mobility. As demonstrated by objective findings, the assigned level of complexity for this evaluation is high.The patient's -[facility] Basic Mobility Inpatient Short Form Raw Score is 11. A Raw score of less than or equal to 16 suggests the patient may benefit from discharge to post-acute rehabilitation services. Please also refer to the recommendation of the Physical Therapist for safe discharge planning.   Goals   Patient Goals To be able to care for herself   STG Expiration Date 09/05/24   Short Term Goal #1 Transfers and gait with roller walker with min assist   Short Term Goal #2 Gait endurance to 40 feet, strength bilateral lower extremities 3+/4 -   LTG Expiration Date 09/12/24   Long Term Goal #1 Independent transfers and gait with roller walker for functional household distances as prior to admission   Plan   Treatment/Interventions  ADL retraining;Functional transfer training;LE strengthening/ROM;Therapeutic exercise;Endurance training;Patient/family training;Bed mobility;Equipment eval/education;Gait training;Compensatory technique education   PT Frequency Other (Comment)  (5 times a week)   Discharge Recommendation   Rehab Resource Intensity Level, PT II (Moderate Resource Intensity)   AM-PAC Basic Mobility Inpatient   Turning in Flat Bed Without Bedrails 2   Lying on Back to Sitting on Edge of Flat Bed Without Bedrails 2   Moving Bed to Chair 2   Standing Up From Chair Using Arms 2   Walk in Room 2   Climb 3-5 Stairs With Railing 1   Basic Mobility Inpatient Raw Score 11   Basic Mobility Standardized Score 30.25   [facility] Highest Level Of Mobility   -[facility] Goal 4: Move to chair/commode   -HLM Achieved 4: Move to chair/commode   Barthel Index   Feeding 10   Bathing 0   Grooming Score 0   Dressing Score 5   Bladder Score 5   Bowels Score 10   Toilet Use Score 5   Transfers (Bed/Chair) Score 5   Mobility (Level Surface) Score 0   Stairs Score 0   Barthel Index Score 40   Additional Treatment Session   Start Time 1405   End Time 1420   Treatment Assessment s: Patient with increased left knee pain possibly due to positioning of legs elevated in chair O: Bilateral lower extremity exercise completed as listed below A: Patient will benefit from continued physical therapy with progression as tolerated and increasing functional mobility with clinical staff as well   Exercises   Hamstring Stretch Sitting;5 reps;PROM;Bilateral   Hip Flexion Sitting;5 reps;Bilateral   Hip Abduction Sitting;5 reps;Bilateral   Knee AROM Long Arc Quad Sitting;5 reps;Bilateral   Ankle Pumps Sitting;10 reps;Bilateral   Licensure   [place] License Number  Abby Jose JUNIOR [id number]

## 2024-08-30 ENCOUNTER — APPOINTMENT (INPATIENT)
Dept: NON INVASIVE DIAGNOSTICS | Facility: HOSPITAL | Age: 79
DRG: 871 | End: 2024-08-30
Payer: MEDICARE

## 2024-08-30 ENCOUNTER — APPOINTMENT (INPATIENT)
Dept: RADIOLOGY | Facility: HOSPITAL | Age: 79
DRG: 871 | End: 2024-08-30
Payer: MEDICARE

## 2024-08-30 PROBLEM — R65.21 SEPTIC SHOCK (HCC): Status: ACTIVE | Noted: 2024-08-28

## 2024-08-30 LAB
ANION GAP SERPL CALCULATED.3IONS-SCNC: 13 MMOL/L (ref 4–13)
ANION GAP SERPL CALCULATED.3IONS-SCNC: 7 MMOL/L (ref 4–13)
AORTIC ROOT: 3.2 CM
APICAL FOUR CHAMBER EJECTION FRACTION: 72 %
ARTERIAL PATENCY WRIST A: YES
BASE EX.OXY STD BLDV CALC-SCNC: 25.4 % (ref 60–80)
BASE EXCESS BLDV CALC-SCNC: -5.6 MMOL/L
BASOPHILS # BLD MANUAL: 0 THOUSAND/UL (ref 0–0.1)
BASOPHILS NFR MAR MANUAL: 0 % (ref 0–1)
BODY TEMPERATURE: 97.4 DEGREES FEHRENHEIT
BSA FOR ECHO PROCEDURE: 1.98 M2
BUN SERPL-MCNC: 22 MG/DL (ref 5–25)
BUN SERPL-MCNC: 23 MG/DL (ref 5–25)
CA-I BLD-SCNC: 0.97 MMOL/L (ref 1.12–1.32)
CA-I BLD-SCNC: 1.09 MMOL/L (ref 1.12–1.32)
CALCIUM SERPL-MCNC: 7.4 MG/DL (ref 8.4–10.2)
CALCIUM SERPL-MCNC: 7.7 MG/DL (ref 8.4–10.2)
CHLORIDE SERPL-SCNC: 104 MMOL/L (ref 96–108)
CHLORIDE SERPL-SCNC: 112 MMOL/L (ref 96–108)
CO2 SERPL-SCNC: 16 MMOL/L (ref 21–32)
CO2 SERPL-SCNC: 21 MMOL/L (ref 21–32)
CORTIS SERPL-MCNC: 23.4 UG/DL
CREAT SERPL-MCNC: 0.85 MG/DL (ref 0.6–1.3)
CREAT SERPL-MCNC: 0.85 MG/DL (ref 0.6–1.3)
E WAVE DECELERATION TIME: 126 MS
E/A RATIO: 1.32
EOSINOPHIL # BLD MANUAL: 0 THOUSAND/UL (ref 0–0.4)
EOSINOPHIL NFR BLD MANUAL: 0 % (ref 0–6)
ERYTHROCYTE [DISTWIDTH] IN BLOOD BY AUTOMATED COUNT: 18.9 % (ref 11.6–15.1)
FRACTIONAL SHORTENING: 58 (ref 28–44)
GFR SERPL CREATININE-BSD FRML MDRD: 66 ML/MIN/1.73SQ M
GFR SERPL CREATININE-BSD FRML MDRD: 66 ML/MIN/1.73SQ M
GLUCOSE SERPL-MCNC: 116 MG/DL (ref 65–140)
GLUCOSE SERPL-MCNC: 69 MG/DL (ref 65–140)
HCO3 BLDV-SCNC: 20.5 MMOL/L (ref 24–30)
HCT VFR BLD AUTO: 36.4 % (ref 34.8–46.1)
HGB BLD-MCNC: 11.5 G/DL (ref 11.5–15.4)
INTERVENTRICULAR SEPTUM IN DIASTOLE (PARASTERNAL SHORT AXIS VIEW): 0.8 CM
INTERVENTRICULAR SEPTUM: 0.8 CM (ref 0.6–1.1)
LAAS-AP2: 23.2 CM2
LAAS-AP4: 14.2 CM2
LACTATE SERPL-SCNC: 1 MMOL/L (ref 0.5–2)
LEFT ATRIUM SIZE: 3.4 CM
LEFT ATRIUM VOLUME (MOD BIPLANE): 48 ML
LEFT ATRIUM VOLUME INDEX (MOD BIPLANE): 24.2 ML/M2
LEFT INTERNAL DIMENSION IN SYSTOLE: 1.7 CM (ref 2.1–4)
LEFT VENTRICULAR INTERNAL DIMENSION IN DIASTOLE: 4 CM (ref 3.5–6)
LEFT VENTRICULAR POSTERIOR WALL IN END DIASTOLE: 0.8 CM
LEFT VENTRICULAR STROKE VOLUME: 59 ML
LVSV (TEICH): 59 ML
LYMPHOCYTES # BLD AUTO: 1.58 THOUSAND/UL (ref 0.6–4.47)
LYMPHOCYTES # BLD AUTO: 23 % (ref 14–44)
MAGNESIUM SERPL-MCNC: 2 MG/DL (ref 1.9–2.7)
MCH RBC QN AUTO: 26.6 PG (ref 26.8–34.3)
MCHC RBC AUTO-ENTMCNC: 31.6 G/DL (ref 31.4–37.4)
MCV RBC AUTO: 84 FL (ref 82–98)
METAMYELOCYTE ABSOLUTE CT: 0.07 THOUSAND/UL (ref 0–0.1)
METAMYELOCYTES NFR BLD MANUAL: 1 % (ref 0–1)
MONOCYTES # BLD AUTO: 0.53 THOUSAND/UL (ref 0–1.22)
MONOCYTES NFR BLD: 8 % (ref 4–12)
MRSA NOSE QL CULT: ABNORMAL
MRSA NOSE QL CULT: ABNORMAL
MV E'TISSUE VEL-LAT: 14 CM/S
MV E'TISSUE VEL-SEP: 11 CM/S
MV PEAK A VEL: 0.75 M/S
MV PEAK E VEL: 99 CM/S
MV STENOSIS PRESSURE HALF TIME: 37 MS
MV VALVE AREA P 1/2 METHOD: 5.95
MYELOCYTE ABSOLUTE CT: 0.07 THOUSAND/UL (ref 0–0.1)
MYELOCYTES NFR BLD MANUAL: 1 % (ref 0–1)
NEUTROPHILS # BLD MANUAL: 4.34 THOUSAND/UL (ref 1.85–7.62)
NEUTS BAND NFR BLD MANUAL: 4 % (ref 0–8)
NEUTS SEG NFR BLD AUTO: 62 % (ref 43–75)
NON VENT ROOM AIR: ABNORMAL %
O2 CT BLDV-SCNC: 4 ML/DL
PCO2 BLDV: 42.4 MM HG (ref 42–50)
PH BLDV: 7.3 [PH] (ref 7.3–7.4)
PHOSPHATE SERPL-MCNC: 3.2 MG/DL (ref 2.3–4.1)
PLATELET # BLD AUTO: 329 THOUSANDS/UL (ref 149–390)
PLATELET BLD QL SMEAR: ADEQUATE
PMV BLD AUTO: 9.8 FL (ref 8.9–12.7)
PO2 BLDV: 18.8 MM HG (ref 35–45)
POTASSIUM SERPL-SCNC: 3.4 MMOL/L (ref 3.5–5.3)
POTASSIUM SERPL-SCNC: 3.6 MMOL/L (ref 3.5–5.3)
PROCALCITONIN SERPL-MCNC: 1.37 NG/ML
RA PRESSURE ESTIMATED: 8 MMHG
RBC # BLD AUTO: 4.33 MILLION/UL (ref 3.81–5.12)
RBC MORPH BLD: NORMAL
RIGHT ATRIUM AREA SYSTOLE A4C: 18.6 CM2
RIGHT VENTRICLE ID DIMENSION: 3.3 CM
RV PSP: 40 MMHG
SL CV LEFT ATRIUM LENGTH A2C: 5.5 CM
SL CV LV EF: 60
SL CV PED ECHO LEFT VENTRICLE DIASTOLIC VOLUME (MOD BIPLANE) 2D: 68 ML
SL CV PED ECHO LEFT VENTRICLE SYSTOLIC VOLUME (MOD BIPLANE) 2D: 9 ML
SODIUM SERPL-SCNC: 133 MMOL/L (ref 135–147)
SODIUM SERPL-SCNC: 140 MMOL/L (ref 135–147)
TR MAX PG: 32 MMHG
TR PEAK VELOCITY: 2.8 M/S
TRICUSPID ANNULAR PLANE SYSTOLIC EXCURSION: 1.9 CM
TRICUSPID VALVE PEAK REGURGITATION VELOCITY: 2.81 M/S
VARIANT LYMPHS # BLD AUTO: 1 %
WBC # BLD AUTO: 6.57 THOUSAND/UL (ref 4.31–10.16)

## 2024-08-30 PROCEDURE — 83735 ASSAY OF MAGNESIUM: CPT | Performed by: NURSE PRACTITIONER

## 2024-08-30 PROCEDURE — 84100 ASSAY OF PHOSPHORUS: CPT | Performed by: NURSE PRACTITIONER

## 2024-08-30 PROCEDURE — 85007 BL SMEAR W/DIFF WBC COUNT: CPT | Performed by: NURSE PRACTITIONER

## 2024-08-30 PROCEDURE — 93306 TTE W/DOPPLER COMPLETE: CPT

## 2024-08-30 PROCEDURE — 85027 COMPLETE CBC AUTOMATED: CPT | Performed by: NURSE PRACTITIONER

## 2024-08-30 PROCEDURE — 99232 SBSQ HOSP IP/OBS MODERATE 35: CPT | Performed by: SPECIALIST

## 2024-08-30 PROCEDURE — NC001 PR NO CHARGE: Performed by: STUDENT IN AN ORGANIZED HEALTH CARE EDUCATION/TRAINING PROGRAM

## 2024-08-30 PROCEDURE — 83605 ASSAY OF LACTIC ACID: CPT | Performed by: NURSE PRACTITIONER

## 2024-08-30 PROCEDURE — 73620 X-RAY EXAM OF FOOT: CPT

## 2024-08-30 PROCEDURE — 82805 BLOOD GASES W/O2 SATURATION: CPT | Performed by: NURSE PRACTITIONER

## 2024-08-30 PROCEDURE — 99291 CRITICAL CARE FIRST HOUR: CPT | Performed by: STUDENT IN AN ORGANIZED HEALTH CARE EDUCATION/TRAINING PROGRAM

## 2024-08-30 PROCEDURE — 73600 X-RAY EXAM OF ANKLE: CPT

## 2024-08-30 PROCEDURE — 84145 PROCALCITONIN (PCT): CPT | Performed by: NURSE PRACTITIONER

## 2024-08-30 PROCEDURE — 80048 BASIC METABOLIC PNL TOTAL CA: CPT | Performed by: NURSE PRACTITIONER

## 2024-08-30 PROCEDURE — 73590 X-RAY EXAM OF LOWER LEG: CPT

## 2024-08-30 PROCEDURE — 82533 TOTAL CORTISOL: CPT | Performed by: NURSE PRACTITIONER

## 2024-08-30 PROCEDURE — 82330 ASSAY OF CALCIUM: CPT | Performed by: NURSE PRACTITIONER

## 2024-08-30 PROCEDURE — 93306 TTE W/DOPPLER COMPLETE: CPT | Performed by: INTERNAL MEDICINE

## 2024-08-30 RX ORDER — ALBUMIN, HUMAN INJ 5% 5 %
25 SOLUTION INTRAVENOUS ONCE
Status: COMPLETED | OUTPATIENT
Start: 2024-08-30 | End: 2024-08-30

## 2024-08-30 RX ORDER — LANOLIN ALCOHOL/MO/W.PET/CERES
100 CREAM (GRAM) TOPICAL DAILY
Status: DISCONTINUED | OUTPATIENT
Start: 2024-08-30 | End: 2024-09-07 | Stop reason: HOSPADM

## 2024-08-30 RX ORDER — ALBUMIN, HUMAN INJ 5% 5 %
12.5 SOLUTION INTRAVENOUS ONCE
Status: COMPLETED | OUTPATIENT
Start: 2024-08-30 | End: 2024-08-30

## 2024-08-30 RX ORDER — ACETAMINOPHEN 325 MG/1
650 TABLET ORAL EVERY 6 HOURS PRN
Status: DISCONTINUED | OUTPATIENT
Start: 2024-08-30 | End: 2024-09-07 | Stop reason: HOSPADM

## 2024-08-30 RX ORDER — POTASSIUM CHLORIDE 1500 MG/1
40 TABLET, EXTENDED RELEASE ORAL ONCE
Status: COMPLETED | OUTPATIENT
Start: 2024-08-30 | End: 2024-08-30

## 2024-08-30 RX ORDER — CALCIUM GLUCONATE 20 MG/ML
2 INJECTION, SOLUTION INTRAVENOUS ONCE
Status: COMPLETED | OUTPATIENT
Start: 2024-08-30 | End: 2024-08-30

## 2024-08-30 RX ORDER — ACETAMINOPHEN 325 MG/1
650 TABLET ORAL EVERY 6 HOURS PRN
Status: DISCONTINUED | OUTPATIENT
Start: 2024-08-30 | End: 2024-08-30

## 2024-08-30 RX ORDER — SODIUM CHLORIDE, SODIUM GLUCONATE, SODIUM ACETATE, POTASSIUM CHLORIDE, MAGNESIUM CHLORIDE, SODIUM PHOSPHATE, DIBASIC, AND POTASSIUM PHOSPHATE .53; .5; .37; .037; .03; .012; .00082 G/100ML; G/100ML; G/100ML; G/100ML; G/100ML; G/100ML; G/100ML
75 INJECTION, SOLUTION INTRAVENOUS CONTINUOUS
Status: DISCONTINUED | OUTPATIENT
Start: 2024-08-30 | End: 2024-08-30

## 2024-08-30 RX ORDER — SODIUM CHLORIDE, SODIUM GLUCONATE, SODIUM ACETATE, POTASSIUM CHLORIDE, MAGNESIUM CHLORIDE, SODIUM PHOSPHATE, DIBASIC, AND POTASSIUM PHOSPHATE .53; .5; .37; .037; .03; .012; .00082 G/100ML; G/100ML; G/100ML; G/100ML; G/100ML; G/100ML; G/100ML
900 INJECTION, SOLUTION INTRAVENOUS ONCE
Status: COMPLETED | OUTPATIENT
Start: 2024-08-30 | End: 2024-08-30

## 2024-08-30 RX ORDER — CEFEPIME HYDROCHLORIDE 2 G/50ML
2000 INJECTION, SOLUTION INTRAVENOUS EVERY 12 HOURS
Status: DISCONTINUED | OUTPATIENT
Start: 2024-08-30 | End: 2024-09-01

## 2024-08-30 RX ORDER — ENOXAPARIN SODIUM 100 MG/ML
40 INJECTION SUBCUTANEOUS
Status: DISCONTINUED | OUTPATIENT
Start: 2024-08-31 | End: 2024-09-07 | Stop reason: HOSPADM

## 2024-08-30 RX ORDER — SODIUM CHLORIDE 9 MG/ML
125 INJECTION, SOLUTION INTRAVENOUS CONTINUOUS
Status: DISCONTINUED | OUTPATIENT
Start: 2024-08-30 | End: 2024-08-30

## 2024-08-30 RX ADMIN — NYSTATIN: 100000 POWDER TOPICAL at 17:06

## 2024-08-30 RX ADMIN — NOREPINEPHRINE BITARTRATE 8 MCG/MIN: 1 INJECTION, SOLUTION, CONCENTRATE INTRAVENOUS at 17:05

## 2024-08-30 RX ADMIN — CEFAZOLIN SODIUM 2000 MG: 2 SOLUTION INTRAVENOUS at 02:35

## 2024-08-30 RX ADMIN — CALCIUM GLUCONATE 2 G: 20 INJECTION, SOLUTION INTRAVENOUS at 06:27

## 2024-08-30 RX ADMIN — SODIUM CHLORIDE 125 ML/HR: 0.9 INJECTION, SOLUTION INTRAVENOUS at 05:46

## 2024-08-30 RX ADMIN — Medication 250 MG: at 17:06

## 2024-08-30 RX ADMIN — ACETAMINOPHEN 650 MG: 325 TABLET ORAL at 05:49

## 2024-08-30 RX ADMIN — CALCIUM GLUCONATE 2 G: 20 INJECTION, SOLUTION INTRAVENOUS at 21:40

## 2024-08-30 RX ADMIN — ALBUMIN (HUMAN) 25 G: 12.5 INJECTION, SOLUTION INTRAVENOUS at 09:31

## 2024-08-30 RX ADMIN — Medication 250 MG: at 09:39

## 2024-08-30 RX ADMIN — ALBUMIN (HUMAN) 12.5 G: 12.5 INJECTION, SOLUTION INTRAVENOUS at 05:46

## 2024-08-30 RX ADMIN — HEPARIN SODIUM 5000 UNITS: 5000 INJECTION, SOLUTION INTRAVENOUS; SUBCUTANEOUS at 13:55

## 2024-08-30 RX ADMIN — ALBUMIN (HUMAN) 25 G: 12.5 INJECTION, SOLUTION INTRAVENOUS at 22:10

## 2024-08-30 RX ADMIN — CEFEPIME HYDROCHLORIDE 2000 MG: 2 INJECTION, SOLUTION INTRAVENOUS at 20:01

## 2024-08-30 RX ADMIN — HEPARIN SODIUM 5000 UNITS: 5000 INJECTION, SOLUTION INTRAVENOUS; SUBCUTANEOUS at 05:50

## 2024-08-30 RX ADMIN — HYOSCYAMINE SULFATE 1 APPLICATION: 16 SOLUTION at 09:39

## 2024-08-30 RX ADMIN — POTASSIUM CHLORIDE 40 MEQ: 1500 TABLET, EXTENDED RELEASE ORAL at 17:06

## 2024-08-30 RX ADMIN — CEFEPIME HYDROCHLORIDE 2000 MG: 2 INJECTION, SOLUTION INTRAVENOUS at 08:58

## 2024-08-30 RX ADMIN — CYANOCOBALAMIN TAB 500 MCG 1000 MCG: 500 TAB at 09:39

## 2024-08-30 RX ADMIN — SODIUM CHLORIDE, SODIUM GLUCONATE, SODIUM ACETATE, POTASSIUM CHLORIDE, MAGNESIUM CHLORIDE, SODIUM PHOSPHATE, DIBASIC, AND POTASSIUM PHOSPHATE 75 ML/HR: .53; .5; .37; .037; .03; .012; .00082 INJECTION, SOLUTION INTRAVENOUS at 06:18

## 2024-08-30 RX ADMIN — SODIUM CHLORIDE 125 ML/HR: 0.9 INJECTION, SOLUTION INTRAVENOUS at 02:33

## 2024-08-30 RX ADMIN — ACETAMINOPHEN 650 MG: 325 TABLET ORAL at 17:05

## 2024-08-30 RX ADMIN — ACETAMINOPHEN 650 MG: 325 TABLET ORAL at 12:26

## 2024-08-30 RX ADMIN — SODIUM CHLORIDE, SODIUM GLUCONATE, SODIUM ACETATE, POTASSIUM CHLORIDE, MAGNESIUM CHLORIDE, SODIUM PHOSPHATE, DIBASIC, AND POTASSIUM PHOSPHATE 900 ML: .53; .5; .37; .037; .03; .012; .00082 INJECTION, SOLUTION INTRAVENOUS at 02:33

## 2024-08-30 RX ADMIN — VANCOMYCIN HYDROCHLORIDE 1500 MG: 10 INJECTION, POWDER, LYOPHILIZED, FOR SOLUTION INTRAVENOUS at 10:46

## 2024-08-30 RX ADMIN — POTASSIUM CHLORIDE 40 MEQ: 1500 TABLET, EXTENDED RELEASE ORAL at 22:10

## 2024-08-30 RX ADMIN — THIAMINE HCL TAB 100 MG 100 MG: 100 TAB at 09:39

## 2024-08-30 RX ADMIN — NOREPINEPHRINE BITARTRATE 2 MCG/MIN: 1 INJECTION, SOLUTION, CONCENTRATE INTRAVENOUS at 07:36

## 2024-08-30 RX ADMIN — NYSTATIN: 100000 POWDER TOPICAL at 09:39

## 2024-08-30 NOTE — ASSESSMENT & PLAN NOTE
POA-multiple open wounds on bilateral lower extremities with erythema and drainage  Blood cultures/urine cultures pending  Continue cefepime/Vanco  Wound care following  Surgery following  Venous duplex negative  LAM unreliable study due to open wounds, severe pain, and patient position

## 2024-08-30 NOTE — SEPSIS NOTE
"  Sepsis Note   Barbie Medel 78 y.o. female MRN: [id number]  Unit/Bed#: [address] [place] [address] Encounter: [id number]          Default Flowsheet Data (Last 720 Hours)       Sepsis Reassess       Row Name 08/29/24 2311                   Repeat Volume Status and Tissue Perfusion Assessment Performed    Date of Reassessment: 08/29/24  -CY        Time of Reassessment: 2230 -CY        Sepsis Reassessment Note: Click \"NEXT\" below (NOT \"close\") to generate sepsis reassessment note. --        Repeat Volume Status and Tissue Perfusion Assessment Performed --                  User Key  (r) = Recorded By, (t) = Taken By, (c) = Cosigned By      Initials Name Provider Type    LEEROY Stafford Nurse Practitioner                    Body mass index is 32.05 kg/m².  Wt Readings from Last 1 Encounters:   08/28/24 84.7 kg (186 lb 11.2 oz)     IBW (Ideal Body Weight): 54.7 kg    Ideal body weight: 54.7 kg (120 lb 9.5 oz)  Adjusted ideal body weight: 66.7 kg (147 lb 0.6 oz)    "

## 2024-08-30 NOTE — ASSESSMENT & PLAN NOTE
Fall in her bathroom complaining of her legs giving out  Was on the floor for 1-1/2 hours before activating 911  CT head negative  CT cervical spine negative  CT chest/abdomen/pelvis - Minimally displaced anterior lateral right rib 10th fracture  PT OT  Fall precautions

## 2024-08-30 NOTE — QUICK NOTE
Patient confirmed friend Liyah is her emergency contact but she doesn't need Liyah called at this time.

## 2024-08-30 NOTE — CONSULTS
[facility]  Consult  Name: Barbie Medel 78 y.o. female I MRN: [id number]  Unit/Bed#: ICU 01 I Date of Admission: 8/28/2024   Date of Service: 8/30/2024 I Hospital Day: 1    Consults    Assessment & Plan   * Sepsis (HCC)  Assessment & Plan  Presented on 8/28 with tachycardia, tachypnea, and leukocytosis  Lactic acid negative  Concern for bilateral lower extremity leg wounds as sepsis source  Continue cefepime/vancomycin  Systolic blood pressures in the 80s -appropriately fluid resuscitated but continued to be hypotensive  Transferred to stepdown unit on 8/30 for hypotension  Follow-up on blood cultures  Follow-up wound cultures  Consider low-dose Levophed if patient continues to be hypotensive    Hyponatremia  Assessment & Plan  Improving  Trend BMP  Currently on isolate at 75 mL/h    Multiple open wounds of lower leg  Assessment & Plan  POA-multiple open wounds on bilateral lower extremities with erythema and drainage  Blood cultures/urine cultures pending  Continue cefepime/Vanco  Wound care following  Surgery following  Venous duplex negative  LAM unreliable study due to open wounds, severe pain, and patient position    Fall  Assessment & Plan  Fall in her bathroom complaining of her legs giving out  Was on the floor for 1-1/2 hours before activating 911  CT head negative  CT cervical spine negative  CT chest/abdomen/pelvis - Minimally displaced anterior lateral right rib 10th fracture  PT OT  Fall precautions      Onychomycosis  Assessment & Plan  Noted in bilateral feet  Podiatry following    Ambulatory dysfunction  Assessment & Plan  Uses walker at baseline  PT/OT           History of Present Illness     HPI: Barbie Medel is a 78 y.o. who presents with a fall.  She has no significant past medical history.  She does not follow with a PCP and has not seen a physician in several years.  The patient reported having a fall in the bathroom where she stated that her legs gave out.  In  the emergency room she was found to be septic with chronic bilateral lower extremity wounds.  Additionally she complained of low back pain.  He was originally admitted to the medical surgical floor for further monitoring/workup and was started on IV antibiotics and wound care.  Unfortunately last evening, the patient continued to be hypotensive with systolic blood pressures in the 80s.  She received 3 L of IV fluid and albumin and continued to be hypotensive.  Although she was asymptomatic, the patient was transferred to the stepdown unit for further monitoring/workup.    History obtained from chart review and the patient.  Review of Systems: Review of Systems   Constitutional:  Positive for fatigue.   HENT: Negative.     Eyes: Negative.    Respiratory: Negative.     Cardiovascular: Negative.    Gastrointestinal: Negative.    Endocrine: Negative.    Genitourinary: Negative.    Musculoskeletal: Negative.    Allergic/Immunologic: Negative.    Neurological:  Positive for weakness.   Hematological: Negative.    Psychiatric/Behavioral: Negative.       Disposition: Stepdown Level 1   Historical Information   No past medical history on file. No past surgical history on file.   No current outpatient medications Allergies   Allergen Reactions    Augmentin [Amoxicillin-Pot Clavulanate] Hives    Codeine Syncope    Erythromycin Hives      Social History     Tobacco Use    Smoking status: Never    Smokeless tobacco: Never   Vaping Use    Vaping status: Never Used   Substance Use Topics    Alcohol use: Not Currently    Drug use: Never    History reviewed. No pertinent family history.     Objective                            Vitals I/O      Most Recent Min/Max in 24hrs   Temp (!) 96.2 °F (35.7 °C) Temp  Min: 96.2 °F (35.7 °C)  Max: 97.7 °F (36.5 °C)   Pulse 74 Pulse  Min: 55  Max: 160   Resp 15 Resp  Min: 15  Max: 19   BP 91/50 BP  Min: 75/45  Max: 107/50   O2 Sat 99 % SpO2  Min: 94 %  Max: 99 %      Intake/Output Summary (Last 24  hours) at 8/30/2024 0714  Last data filed at 8/29/2024 2300  Gross per 24 hour   Intake 2185 ml   Output 1000 ml   Net 1185 ml       Diet Regular; Regular House    Invasive Monitoring           Physical Exam   Physical Exam  Vitals and nursing note reviewed.   Eyes:      General: Vision grossly intact.      Extraocular Movements: Extraocular movements intact.      Conjunctiva/sclera: Conjunctivae normal.      Pupils: Pupils are equal, round, and reactive to light.   Skin:     General: Skin is warm and dry.   HENT:      Head: Normocephalic and atraumatic.      Right Ear: Hearing and tympanic membrane normal.      Left Ear: Hearing and tympanic membrane normal.      Mouth/Throat:      Mouth: Mucous membranes are dry.   Cardiovascular:      Rate and Rhythm: Normal rate and regular rhythm.   Musculoskeletal:         General: Normal range of motion.      Right lower leg: Trace Edema present.      Left lower leg: Trace Edema present.   Abdominal: General: Bowel sounds are normal.   Constitutional:       Appearance: She is ill-appearing and toxic-appearing.   Pulmonary:      Breath sounds: Normal breath sounds.      Comments: Bilateral breath sounds clear but diminished  Neurological:      Mental Status: She is alert and oriented to person, place and time. Mental status is at baseline.      Motor: gross motor function is at baseline for patient. Strength full and intact in all extremities.      Comments: Hard of hearing   Genitourinary/Anorectal:     Comments: Due to void           Diagnostic Studies      EKG: Normal sinus rhythm, alarms on  Imaging: CT chest abdomen pelvis w contrast    Result Date: 8/28/2024  Impression: 1.  Age-indeterminate minimally displaced anterolateral right 10th rib fracture. 2.2 cm region of hypoattenuation in the inferoposterior right hepatic lobe is favored to represent a prominent diaphragmatic slip (normal anatomy), whereas a laceration is considered less likely despite the slightly more  inferior age-indeterminate anterolateral right 10th rib fracture. 2.  Interstitial reticulation in the right lower lobe is likely atelectasis given the elevation of the adjacent diaphragm. Workstation performed: [id number]     CT cervical spine without contrast    Result Date: 8/28/2024  Impression: 1.  No cervical spine fracture or traumatic malalignment. 2.  Severe multilevel degenerative change. 3.  Diminished osseous trabeculation most pronounced at the C6 vertebral body is likely due to low mineral density. Workstation performed: [id number]     CT head without contrast    Result Date: 8/28/2024  Impression: 1.  No acute intracranial abnormality. 2.  Chronic microangiopathic changes. 3.  Significant sinus mucosal disease of the maxillary sinuses. Workstation performed: [id number]    I have personally reviewed pertinent reports.   and I have personally reviewed pertinent films in PACS     Medications:  Scheduled PRN   acetaminophen, 650 mg, Q6H VIRGIL  calcium gluconate, 2 g, Once  cefepime, 2,000 mg, Q12H  cyanocobalamin, 1,000 mcg, Daily  ergocalciferol, 50,000 Units, Weekly  heparin (porcine), 5,000 Units, Q8H VIRGIL  nystatin, , BID  saccharomyces boulardii, 250 mg, BID  sodium hypochlorite, 1 Application, Daily  vancomycin, 12.5 mg/kg, Q24H      acetaminophen, 650 mg, Q6H PRN       Continuous    multi-electrolyte, 75 mL/hr, Last Rate: 75 mL/hr (08/30/24 0618)  norepinephrine, 1-30 mcg/min         Labs:    CBC    Recent Labs     08/29/24 2058 08/30/24  0636   WBC 7.05 6.57   HGB 12.1 11.5   HCT 37.5 36.4    329     BMP    Recent Labs     08/29/24 1826 08/29/24  2110   SODIUM 129* 129*   K 4.0 3.8   CL 98 99   CO2 22 20*   AGAP 9 10   BUN 31* 30*   CREATININE 1.06 1.06   CALCIUM 7.9* 7.7*       Coags    No recent results     Additional Electrolytes  Recent Labs     08/29/24  1826 08/30/24  0636   MG 2.3 2.0   PHOS  --  3.2          Blood Gas    No recent results  No recent results LFTs  Recent Labs      08/28/24  0802 08/29/24  1826   ALT 21 6*   AST 19 13   ALKPHOS 118* 99   ALB 3.6 2.7*   TBILI 0.42 0.19*       Infectious  Recent Labs     08/29/24  1826   PROCALCITONI 1.57*     Glucose  Recent Labs     08/28/24  0802 08/29/24  1826 08/29/24  2110   GLUC 103 91 85               LEEROY Pena

## 2024-08-30 NOTE — PROGRESS NOTES
Barbie Medel is a 78 y.o. female who is currently ordered Vancomycin IV with management by the Pharmacy Consult service.  Relevant clinical data and objective / subjective history reviewed.  Vancomycin Assessment:  Indication and Goal AUC/Trough: Soft tissue (goal -600, trough >10); Other, sepsis, -600, trough >10  Clinical Status: stable  Micro:     Renal Function:  SCr: 0.85 mg/dL  CrCl: 61.5 mL/min  Renal replacement: Not on dialysis  Days of Therapy: 2  Current Dose: 1000 mg Q24H. AUC and Trough below goal.  Vancomycin Plan:  New Dosin mg Q24H with estimated AUC and Trough 495 and 13 respectfully  Estimated AUC: 495 mcg*hr/mL  Estimated Trough: 13 mcg/mL  Next Level: 24 @0600 am draw  Renal Function Monitoring: Daily BMP and UOP  Pharmacy will continue to follow closely for s/sx of nephrotoxicity, infusion reactions and appropriateness of therapy.  BMP and CBC will be ordered per protocol. We will continue to follow the patient’s culture results and clinical progress daily.    Nancy Angeles, Pharmacist

## 2024-08-30 NOTE — PLAN OF CARE
Problem: PAIN - ADULT  Goal: Verbalizes/displays adequate comfort level or baseline comfort level  Description: Interventions:  - Encourage patient to monitor pain and request assistance  - Assess pain using appropriate pain scale  - Administer analgesics based on type and severity of pain and evaluate response  - Implement non-pharmacological measures as appropriate and evaluate response  - Consider cultural and social influences on pain and pain management  - Notify physician/advanced practitioner if interventions unsuccessful or patient reports new pain  Outcome: Progressing     Problem: INFECTION - ADULT  Goal: Absence or prevention of progression during hospitalization  Description: INTERVENTIONS:  - Assess and monitor for signs and symptoms of infection  - Monitor lab/diagnostic results  - Monitor all insertion sites, i.e. indwelling lines, tubes, and drains  - Monitor endotracheal if appropriate and nasal secretions for changes in amount and color  - [place] appropriate cooling/warming therapies per order  - Administer medications as ordered  - Instruct and encourage patient and family to use good hand hygiene technique  - Identify and instruct in appropriate isolation precautions for identified infection/condition  Outcome: Progressing        Problem: METABOLIC, FLUID AND ELECTROLYTES - ADULT  Goal: Electrolytes maintained within normal limits  Description: INTERVENTIONS:  - Monitor labs and assess patient for signs and symptoms of electrolyte imbalances  - Administer electrolyte replacement as ordered  - Monitor response to electrolyte replacements, including repeat lab results as appropriate  - Instruct patient on fluid and nutrition as appropriate  Outcome: Progressing

## 2024-08-30 NOTE — PROGRESS NOTES
"Progress Note - General Surgery   Barbie Medel 78 y.o. female MRN: [id number]  Unit/Bed#: ICU 01 Encounter: [id number]    Assessment:  79 y/o f with no significant pmh hx presenting with fall at home.Patient found to be septic evidence by tachycardia, tachypnea and WBC 15.61. Surgery team consulted for evaluation bilateral leg wounds.     Patient hypotension requiring levophed.       Plan:  Continue local wound care as written per wound care nurse.   Wounds evaluated at bedside no acute surgical intervention at this time.     Subjective/Objective     Patient was seen and examined beside.     Blood pressure 112/55, pulse 88, temperature (!) 97 °F (36.1 °C), temperature source Temporal, resp. rate 22, height 5' 4\" (1.626 m), weight 93.6 kg (206 lb 5.6 oz), SpO2 98%.,Body mass index is 35.42 kg/m².      Intake/Output Summary (Last 24 hours) at 8/30/2024 1322  Last data filed at 8/30/2024 1238  Gross per 24 hour   Intake 3254.56 ml   Output 2325 ml   Net 929.56 ml       Invasive Devices       Peripheral Intravenous Line  Duration             Long-Dwell Peripheral IV (Midline) 08/30/24 Left Brachial <1 day    Peripheral IV 08/30/24 Left;Ventral (anterior) Forearm <1 day    Peripheral IV 08/30/24 Right;Ventral (anterior) Forearm <1 day                    Physical Exam: /55   Pulse 88   Temp (!) 97 °F (36.1 °C) (Temporal)   Resp 22   Ht 5' 4\" (1.626 m)   Wt 93.6 kg (206 lb 5.6 oz)   SpO2 98%   BMI 35.42 kg/m²   General appearance: alert and oriented, in no acute distress  Head: Normocephalic, without obvious abnormality, atraumatic  Lungs:  normal effort.   Heart:  regular rate.   Abdomen: soft, non-tender.  Extremities: b/l later legs wound circumferentially, improving slough and eschar.     Lab, Imaging and other studies:I have personally reviewed pertinent lab results.  , CBC:   Lab Results   Component Value Date    WBC 6.57 08/30/2024    HGB 11.5 08/30/2024    HCT 36.4 08/30/2024    MCV 84 08/30/2024 "     08/30/2024    RBC 4.33 08/30/2024    MCH 26.6 (L) 08/30/2024    MCHC 31.6 08/30/2024    RDW 18.9 (H) 08/30/2024    MPV 9.8 08/30/2024   , CMP:   Lab Results   Component Value Date    SODIUM 133 (L) 08/30/2024    K 3.4 (L) 08/30/2024     08/30/2024    CO2 16 (L) 08/30/2024    BUN 23 08/30/2024    CREATININE 0.85 08/30/2024    CALCIUM 7.4 (L) 08/30/2024    AST 13 08/29/2024    ALT 6 (L) 08/29/2024    ALKPHOS 99 08/29/2024    EGFR 66 08/30/2024     VTE Pharmacologic Prophylaxis: Heparin  VTE Mechanical Prophylaxis: sequential compression device

## 2024-08-30 NOTE — PROGRESS NOTES
Critical Care Attending Note; Tommy Colon   Note Date: 24  Note Time: 8:39 AM    Patient: Barbie Medel  Age, : 78 y.o., 1945 MRN: [id number] Code Status: Level 1 - Full Code Patient Location: ICU 01/ICU 01   Hospital LOS:1 days  ]   Patient seen and examined, medical record reviewed, discussed with house staff and nursing staff.     HPI   CC: Septic Shock  78F with no reported PMH who presented post fall found to have cellulitis/Multiple wounds who had progressive hypotension requiring vasopressor support.     Main ICU Plans:         #Card  Shock - Distributive may have a component of low oncotic pressor - POCUS poor windows, hyperdynamic LV, RV large but normal function. Initially came in concentrated with significant pedal edema  - Albumin trial 1gm/kg  - Levophed MAP >65 mmHG  - ECHO      #Renal  Hyponatremia - mild   - Monitor    #GI  Malnutrition  - Thiamine, Folate  - Monitor electrolytes    #ID   Sepsis - Cellulitis/Wound Infection - Progression of sepsis is unusual in the setting of hospitalization. Will discuss with surgery the utility of LE imaging.  She may have an aspiration pneumonia but with minimal respiratory complaints  - Surgery Consulted - Appreciate recs - Bedside debridement   - Broadened Vanc/Cefepime  - LE Xray Bilateral        #MSK  Rib Fracture   - Pain control     #DVT/GI ppx  SQH    #Lines/Tubes/Drains:   Invasive Devices       Peripheral Intravenous Line  Duration             Peripheral IV 24 Left;Ventral (anterior) Forearm <1 day    Peripheral IV 24 Right;Ventral (anterior) Forearm <1 day                    #Nutrition:   Diet Regular; Regular House        #Code Status:   Level 1 - Full Code    #Dispo:   ICU        Tommy Colon MD  Pulmonary, Critical Care    Critical care time, excluding procedures, teaching, family meetings, and excludes any prior time recorded by the AP/resident, 35 minutes. Upon my evaluation, this patient has a high  "probability of imminent or life-threatening deterioration due to above problems which required my direct attention, intervention, and personal management.   Impression/Active Problems:    Cellulitis   Wound infection   Shock - Distributive   Malnutrition    Physical Exam:     Vital Signs:   Weight: 93.6 kg (206 lb 5.6 oz)  IBW: Ideal body weight: 54.7 kg (120 lb 9.5 oz)  Adjusted ideal body weight: 70.3 kg (154 lb 14.3 oz)  Temp:  [96 °F (35.6 °C)-97.2 °F (36.2 °C)] 96 °F (35.6 °C)  HR:  [] 80  Resp:  [13-29] 29  BP: ()/(41-57) 105/51  General: NAD  Neuro: AxO 3  Heart: RRR  Lungs: CTAB  Abdomen: Soft NT  Extremities:  +++ Pedal edema, Dressing CDI                 Ventilator Settings:               Invalid input(s): \"PCO2\", \"O2\"  Radiologic Images Reviewed:    CT Head    1.  No acute intracranial abnormality.   2.  Chronic microangiopathic changes.   3.  Significant sinus mucosal disease of the maxillary sinuses.     CT C/A/P  1.  Age-indeterminate minimally displaced anterolateral right 10th rib fracture. 2.2 cm region of hypoattenuation in the inferoposterior right hepatic lobe is favored to represent a prominent diaphragmatic slip (normal anatomy), whereas a laceration is   considered less likely despite the slightly more inferior age-indeterminate anterolateral right 10th rib fracture.   2.  Interstitial reticulation in the right lower lobe is likely atelectasis given the elevation of the adjacent diaphragm.         Input / Output:     Intake/Output Summary (Last 24 hours) at 8/30/2024 0839  Last data filed at 8/30/2024 0746  Gross per 24 hour   Intake 2185 ml   Output 1350 ml   Net 835 ml            Infusions:  multi-electrolyte, 75 mL/hr, Last Rate: 75 mL/hr (08/30/24 0618)  norepinephrine, 1-30 mcg/min, Last Rate: 2 mcg/min (08/30/24 0736)      Scheduled Medications:  Current Facility-Administered Medications   Medication Dose Route Frequency Provider Last Rate    acetaminophen  650 mg Oral Q6H " "VIRGIL Magui Spironello V, CRNP      acetaminophen  650 mg Oral Q6H PRN Magui Spironello V, CRNP      cefepime  2,000 mg Intravenous Q12H Magui Spironello V, CRNP      cyanocobalamin  1,000 mcg Oral Daily Magui Spironello V, CRNP      ergocalciferol  50,000 Units Oral Weekly Magui Spironello V, CRNP      heparin (porcine)  5,000 Units Subcutaneous Q8H VIRGIL Magui Spironello V, CRNP      multi-electrolyte  75 mL/hr Intravenous Continuous Magui Spironello V, CRNP 75 mL/hr (08/30/24 0618)    norepinephrine  1-30 mcg/min Intravenous Titrated Magui Spironello V, CRNP 2 mcg/min (08/30/24 0736)    nystatin   Topical BID Magui Spironello V, CRNP      saccharomyces boulardii  250 mg Oral BID Magui Spironello V, CRNP      sodium hypochlorite  1 Application Irrigation Daily Magui Spironello V, CRNP      vancomycin  12.5 mg/kg Intravenous Q24H Magui Spironello V, CRNP Stopped (08/29/24 2338)       PRN Medications:    acetaminophen    Labs Reviewed:  Results from last 7 days   Lab Units 08/30/24  0636 08/29/24  2058 08/28/24  0802   WBC Thousand/uL 6.57 7.05 15.61*   HEMOGLOBIN g/dL 11.5 12.1 15.0   HEMATOCRIT % 36.4 37.5 48.3*   PLATELETS Thousands/uL 329 337 458*      Results from last 7 days   Lab Units 08/30/24  0636 08/29/24  2110 08/29/24  1826 08/28/24  0802   SODIUM mmol/L  --  129* 129* 134*   CO2 mmol/L  --  20* 22 22   BUN mg/dL  --  30* 31* 35*   CALCIUM mg/dL  --  7.7* 7.9* 9.3   MAGNESIUM mg/dL 2.0  --  2.3 2.4   PHOSPHORUS mg/dL 3.2  --   --   --          Invalid input(s): \"ASTSGOT\", \"ALTSGPT\"LABRCNTIP@ ,alkphos:3,tbilirubin:3,dbilirubin:3)@            Invalid input(s): \"TROPT\", \"PBNP\"             I have personally seen and examined the patient on (08/30/24 between 1706-5432). I discussed the patient with the AP/resident including, but not limited to, verifying findings; reviewing labs and x-rays; discussing with consultants; developing the plan of care with the bedside nurse; and " discussing treatment plan with patient or surrogate.  I have reviewed the note and assessment performed by the AP/resident and agree with the AP/resident’s documented findings and plan of care with the above additions/exceptions. Please see my comments for details and adjustments.

## 2024-08-30 NOTE — QUICK NOTE
Ordered 30 cc/kg IV fluid bolus based on ideal body weight due to high BMI.  Will assess patient after bolus completion.  Consult pharmacy for Vanco dosing.      Addendum:  Seen patient at bedside.   Patient awake alert oriented x 3, denies SOB chest pain headache dizziness fever chills.  Lungs sound clear diminished, on room air.  Heart regular  Bilateral lower extremity edema with weeping wounds/wet dressing  Skin is dry and warm to touch.    Repeat /50.  Added BNP in view of leg edema.  Elevate legs.  Continue to monitor patient.    BP decreased to 92/44 around 2 AM.  BNP 45.  Patient on room air, satting adequately.  Will order additional fluid bolus to give total of 30cc/kg fluid bolus based on actual body weight.  Will increase maintenance fluid to 125 cc per hour.   Continue to monitor patient closely.

## 2024-08-30 NOTE — ASSESSMENT & PLAN NOTE
Presented on 8/28 with tachycardia, tachypnea, and leukocytosis  Lactic acid negative  Concern for bilateral lower extremity leg wounds as sepsis source  Continue cefepime/vancomycin  Systolic blood pressures in the 80s -appropriately fluid resuscitated but continued to be hypotensive  Transferred to stepdown unit on 8/30 for hypotension  Follow-up on blood cultures  Follow-up wound cultures  Consider low-dose Levophed if patient continues to be hypotensive

## 2024-08-30 NOTE — PROGRESS NOTES
Barbie Medel is a 78 y.o. female who is currently ordered Vancomycin IV with management by the Pharmacy Consult service.  Relevant clinical data and objective / subjective history reviewed.  Vancomycin Assessment:  Indication and Goal AUC/Trough: Soft tissue (goal -600, trough >10)  Clinical Status:  new  Micro:   pending  Renal Function:  SCr: 46.8 mg/dL  CrCl: 1.06 mL/min  Renal replacement: Not on dialysis  Days of Therapy: 1  Current Dose: 2000mg IV once (LD 25mg/kg)  Vancomycin Plan:  New Dosinmg IV q24h  Estimated AUC: 446 mcg*hr/mL  Estimated Trough: 12.8 mcg/mL  Next Level: 24 @ 0600  Renal Function Monitoring: Daily BMP and UOP  Pharmacy will continue to follow closely for s/sx of nephrotoxicity, infusion reactions and appropriateness of therapy.  BMP and CBC will be ordered per protocol. We will continue to follow the patient’s culture results and clinical progress daily.    Val Brooks, Pharmacist

## 2024-08-30 NOTE — PROGRESS NOTES
Pt transferred from 323 sec to hypotension via [facility] care bed ,pt received and transferred to bed,vitals monitored,chlorhexidine bath given,all the orders followed and made comfortable,and will continue to monitor

## 2024-08-30 NOTE — NURSING NOTE
Bolus held for period of time due to IV infiltration on patients left antecubital Site. Bolus held until access was able to be achieved on right IV. Fluids immediately continued once access achieved. Provider made aware of bolus delay and bolus restart.

## 2024-08-31 PROBLEM — E87.20 METABOLIC ACIDOSIS: Status: ACTIVE | Noted: 2024-08-31

## 2024-08-31 PROBLEM — E87.1 HYPONATREMIA: Status: RESOLVED | Noted: 2024-08-28 | Resolved: 2024-08-31

## 2024-08-31 LAB
ANION GAP SERPL CALCULATED.3IONS-SCNC: 5 MMOL/L (ref 4–13)
ARTERIAL PATENCY WRIST A: YES
BASE EX.OXY STD BLDV CALC-SCNC: 49.6 % (ref 60–80)
BASE EXCESS BLDV CALC-SCNC: -6.5 MMOL/L
BASOPHILS # BLD AUTO: 0.02 THOUSANDS/ÂΜL (ref 0–0.1)
BASOPHILS NFR BLD AUTO: 0 % (ref 0–1)
BODY TEMPERATURE: 98.1 DEGREES FEHRENHEIT
BUN SERPL-MCNC: 20 MG/DL (ref 5–25)
CA-I BLD-SCNC: 1.1 MMOL/L (ref 1.12–1.32)
CALCIUM SERPL-MCNC: 8 MG/DL (ref 8.4–10.2)
CHLORIDE SERPL-SCNC: 114 MMOL/L (ref 96–108)
CO2 SERPL-SCNC: 20 MMOL/L (ref 21–32)
CREAT SERPL-MCNC: 0.79 MG/DL (ref 0.6–1.3)
EOSINOPHIL # BLD AUTO: 0.04 THOUSAND/ÂΜL (ref 0–0.61)
EOSINOPHIL NFR BLD AUTO: 1 % (ref 0–6)
ERYTHROCYTE [DISTWIDTH] IN BLOOD BY AUTOMATED COUNT: 19.2 % (ref 11.6–15.1)
GFR SERPL CREATININE-BSD FRML MDRD: 72 ML/MIN/1.73SQ M
GLUCOSE SERPL-MCNC: 89 MG/DL (ref 65–140)
HCO3 BLDV-SCNC: 18.8 MMOL/L (ref 24–30)
HCT VFR BLD AUTO: 33.5 % (ref 34.8–46.1)
HGB BLD-MCNC: 10.3 G/DL (ref 11.5–15.4)
IMM GRANULOCYTES # BLD AUTO: 0.16 THOUSAND/UL (ref 0–0.2)
IMM GRANULOCYTES NFR BLD AUTO: 2 % (ref 0–2)
LYMPHOCYTES # BLD AUTO: 1.26 THOUSANDS/ÂΜL (ref 0.6–4.47)
LYMPHOCYTES NFR BLD AUTO: 16 % (ref 14–44)
MAGNESIUM SERPL-MCNC: 1.9 MG/DL (ref 1.9–2.7)
MCH RBC QN AUTO: 26.3 PG (ref 26.8–34.3)
MCHC RBC AUTO-ENTMCNC: 30.7 G/DL (ref 31.4–37.4)
MCV RBC AUTO: 86 FL (ref 82–98)
MONOCYTES # BLD AUTO: 0.89 THOUSAND/ÂΜL (ref 0.17–1.22)
MONOCYTES NFR BLD AUTO: 12 % (ref 4–12)
NEUTROPHILS # BLD AUTO: 5.3 THOUSANDS/ÂΜL (ref 1.85–7.62)
NEUTS SEG NFR BLD AUTO: 69 % (ref 43–75)
NON VENT ROOM AIR: ABNORMAL %
NRBC BLD AUTO-RTO: 0 /100 WBCS
O2 CT BLDV-SCNC: 7.6 ML/DL
PCO2 BLDV: 36.6 MM HG (ref 42–50)
PH BLDV: 7.33 [PH] (ref 7.3–7.4)
PHOSPHATE SERPL-MCNC: 2.8 MG/DL (ref 2.3–4.1)
PLATELET # BLD AUTO: 367 THOUSANDS/UL (ref 149–390)
PMV BLD AUTO: 9.8 FL (ref 8.9–12.7)
PO2 BLDV: 26.7 MM HG (ref 35–45)
POTASSIUM SERPL-SCNC: 4.2 MMOL/L (ref 3.5–5.3)
PROCALCITONIN SERPL-MCNC: 1.2 NG/ML
RBC # BLD AUTO: 3.92 MILLION/UL (ref 3.81–5.12)
SODIUM SERPL-SCNC: 139 MMOL/L (ref 135–147)
VANCOMYCIN TROUGH SERPL-MCNC: 19 UG/ML (ref 10–20)
WBC # BLD AUTO: 7.67 THOUSAND/UL (ref 4.31–10.16)

## 2024-08-31 PROCEDURE — 99232 SBSQ HOSP IP/OBS MODERATE 35: CPT | Performed by: INTERNAL MEDICINE

## 2024-08-31 PROCEDURE — 80202 ASSAY OF VANCOMYCIN: CPT | Performed by: NURSE PRACTITIONER

## 2024-08-31 PROCEDURE — 83735 ASSAY OF MAGNESIUM: CPT | Performed by: NURSE PRACTITIONER

## 2024-08-31 PROCEDURE — 80048 BASIC METABOLIC PNL TOTAL CA: CPT | Performed by: NURSE PRACTITIONER

## 2024-08-31 PROCEDURE — 85025 COMPLETE CBC W/AUTO DIFF WBC: CPT | Performed by: NURSE PRACTITIONER

## 2024-08-31 PROCEDURE — 84145 PROCALCITONIN (PCT): CPT | Performed by: NURSE PRACTITIONER

## 2024-08-31 PROCEDURE — 84100 ASSAY OF PHOSPHORUS: CPT | Performed by: NURSE PRACTITIONER

## 2024-08-31 PROCEDURE — 82330 ASSAY OF CALCIUM: CPT | Performed by: NURSE PRACTITIONER

## 2024-08-31 PROCEDURE — 82805 BLOOD GASES W/O2 SATURATION: CPT | Performed by: NURSE PRACTITIONER

## 2024-08-31 RX ORDER — MAGNESIUM SULFATE HEPTAHYDRATE 40 MG/ML
2 INJECTION, SOLUTION INTRAVENOUS ONCE
Status: COMPLETED | OUTPATIENT
Start: 2024-08-31 | End: 2024-08-31

## 2024-08-31 RX ORDER — SODIUM CHLORIDE, SODIUM GLUCONATE, SODIUM ACETATE, POTASSIUM CHLORIDE, MAGNESIUM CHLORIDE, SODIUM PHOSPHATE, DIBASIC, AND POTASSIUM PHOSPHATE .53; .5; .37; .037; .03; .012; .00082 G/100ML; G/100ML; G/100ML; G/100ML; G/100ML; G/100ML; G/100ML
500 INJECTION, SOLUTION INTRAVENOUS ONCE
Status: COMPLETED | OUTPATIENT
Start: 2024-08-31 | End: 2024-08-31

## 2024-08-31 RX ORDER — ALBUMIN, HUMAN INJ 5% 5 %
12.5 SOLUTION INTRAVENOUS ONCE
Status: COMPLETED | OUTPATIENT
Start: 2024-08-31 | End: 2024-08-31

## 2024-08-31 RX ADMIN — ENOXAPARIN SODIUM 40 MG: 40 INJECTION SUBCUTANEOUS at 08:38

## 2024-08-31 RX ADMIN — NOREPINEPHRINE BITARTRATE 6 MCG/MIN: 1 INJECTION, SOLUTION, CONCENTRATE INTRAVENOUS at 22:15

## 2024-08-31 RX ADMIN — NOREPINEPHRINE BITARTRATE 7 MCG/MIN: 1 INJECTION, SOLUTION, CONCENTRATE INTRAVENOUS at 00:41

## 2024-08-31 RX ADMIN — NYSTATIN 1 APPLICATION: 100000 POWDER TOPICAL at 08:41

## 2024-08-31 RX ADMIN — ACETAMINOPHEN 650 MG: 325 TABLET ORAL at 02:49

## 2024-08-31 RX ADMIN — HYOSCYAMINE SULFATE 1 APPLICATION: 16 SOLUTION at 08:43

## 2024-08-31 RX ADMIN — NOREPINEPHRINE BITARTRATE 5 MCG/MIN: 1 INJECTION, SOLUTION, CONCENTRATE INTRAVENOUS at 09:18

## 2024-08-31 RX ADMIN — Medication 250 MG: at 08:38

## 2024-08-31 RX ADMIN — VANCOMYCIN HYDROCHLORIDE 1250 MG: 10 INJECTION, POWDER, LYOPHILIZED, FOR SOLUTION INTRAVENOUS at 12:58

## 2024-08-31 RX ADMIN — MAGNESIUM SULFATE HEPTAHYDRATE 2 G: 40 INJECTION, SOLUTION INTRAVENOUS at 08:38

## 2024-08-31 RX ADMIN — CEFEPIME HYDROCHLORIDE 2000 MG: 2 INJECTION, SOLUTION INTRAVENOUS at 19:52

## 2024-08-31 RX ADMIN — CYANOCOBALAMIN TAB 500 MCG 1000 MCG: 500 TAB at 08:38

## 2024-08-31 RX ADMIN — THIAMINE HCL TAB 100 MG 100 MG: 100 TAB at 08:38

## 2024-08-31 RX ADMIN — ALBUMIN (HUMAN) 12.5 G: 12.5 INJECTION, SOLUTION INTRAVENOUS at 06:03

## 2024-08-31 RX ADMIN — SODIUM CHLORIDE, SODIUM GLUCONATE, SODIUM ACETATE, POTASSIUM CHLORIDE, MAGNESIUM CHLORIDE, SODIUM PHOSPHATE, DIBASIC, AND POTASSIUM PHOSPHATE 500 ML: .53; .5; .37; .037; .03; .012; .00082 INJECTION, SOLUTION INTRAVENOUS at 09:16

## 2024-08-31 RX ADMIN — SODIUM CHLORIDE, SODIUM GLUCONATE, SODIUM ACETATE, POTASSIUM CHLORIDE, MAGNESIUM CHLORIDE, SODIUM PHOSPHATE, DIBASIC, AND POTASSIUM PHOSPHATE 500 ML: .53; .5; .37; .037; .03; .012; .00082 INJECTION, SOLUTION INTRAVENOUS at 10:41

## 2024-08-31 RX ADMIN — Medication 250 MG: at 18:28

## 2024-08-31 RX ADMIN — CEFEPIME HYDROCHLORIDE 2000 MG: 2 INJECTION, SOLUTION INTRAVENOUS at 07:33

## 2024-08-31 RX ADMIN — NYSTATIN 1 APPLICATION: 100000 POWDER TOPICAL at 18:23

## 2024-08-31 NOTE — PROGRESS NOTES
[facility]  Progress Note  Name: Barbie Medel I  MRN: [id number]  Unit/Bed#: ICU 01 I Date of Admission: 8/28/2024   Date of Service: 8/31/2024 I Hospital Day: 2    Assessment & Plan   * Septic shock (HCC)  Assessment & Plan  SIRS on presentation 8/28 with tachycardia, tachypnea, and leukocytosis  Lactic acid negative  Concern for bilateral lower extremity leg wounds as sepsis source  Continue cefepime/vancomycin D2   Systolic blood pressures in the 80s -appropriately fluid resuscitated but continued to be hypotensive  Transferred to stepdown unit on 8/30 for hypotension  Wound cx 4+ staph aureas  BC no growth at 48 hours   Given an additional 500mL 5% albumin overnight  Levo to maintain MAP > 65   Requiring 7-8mcg   Procal 1.57--1.37, trend  Trend temps and WBC   Echo 8/30: EF 60%  Cortisol 23, TSH 4.96, Free T4 1.03     Metabolic acidosis  Assessment & Plan  VBG 7.30/42.4/18.8/20.5/-5.6  Serum bicarb 16-->21   Vomiting and diarrhea at home  S/p IVFs  Trend VBG/BMP    Hyponatremia-resolved as of 8/31/2024  Assessment & Plan  Mild, likely hypovolemic   Na   S/p IVF  Trend BMP daily  Patient endorses vomiting and diarrhea at home prior to admission    Multiple open wounds of lower leg  Assessment & Plan  POA-multiple open wounds on bilateral lower extremities with erythema and drainage  Continue cefepime/Vanco  Wound care following  Surgery following  Venous duplex negative  LAM unreliable study due to open wounds, severe pain, and patient position  PRN Tylenol for pain control    Fall  Assessment & Plan  Fall in her bathroom complaining of her legs giving out  Was on the floor for 1-1/2 hours before activating 911  CT head negative  CT cervical spine negative  CT chest/abdomen/pelvis - Minimally displaced anterior lateral right rib 10th fracture  PT OT  Fall precautions      Onychomycosis  Assessment & Plan  Noted in bilateral feet  Podiatry following    Ambulatory  dysfunction  Assessment & Plan  Uses walker at baseline  PT/OT             Disposition: Critical care    ICU Core Measures     A: Assess, Prevent, and Manage Pain Has pain been assessed? Yes  Need for changes to pain regimen? No   B: Both SAT/SAT  N/A   C: Choice of Sedation RASS Goal: N/A patient not on sedation  Need for changes to sedation or analgesia regimen? NA   D: Delirium CAM-ICU: Negative   E: Early Mobility  Plan for early mobility? Yes   F: Family Engagement Plan for family engagement today? Yes       Antibiotic Review: Awaiting culture results.  and Continue broad spectrum secondary to severity of illness.       Prophylaxis:  VTE VTE covered by:  enoxaparin, Subcutaneous       Stress Ulcer  not ordered         Significant 24hr Events     24hr events: 500mL 5% albumin overnight. Levo max 8mcg, currently 7mcg.      Subjective   Review of Systems: Review of Systems   Musculoskeletal:         B/L leg pain    All other systems reviewed and are negative.       Objective                            Vitals I/O      Most Recent Min/Max in 24hrs   Temp 97.6 °F (36.4 °C) Temp  Min: 96 °F (35.6 °C)  Max: 97.6 °F (36.4 °C)   Pulse 70 Pulse  Min: 55  Max: 160   Resp (!) 23 Resp  Min: 13  Max: 35   BP (!) 84/50 BP  Min: 65/36  Max: 135/61   O2 Sat 97 % SpO2  Min: 75 %  Max: 100 %      Intake/Output Summary (Last 24 hours) at 8/31/2024 0127  Last data filed at 8/30/2024 2345  Gross per 24 hour   Intake 2870.5 ml   Output 2775 ml   Net 95.5 ml       Diet Regular; Regular House    Invasive Monitoring   N/A        Physical Exam   Physical Exam  Eyes:      General: Lids are normal.      Extraocular Movements: Extraocular movements intact.      Conjunctiva/sclera: Conjunctivae normal.      Pupils: Pupils are equal, round, and reactive to light.   Skin:     General: Skin is warm and dry.      Capillary Refill: Capillary refill takes less than 2 seconds.      Coloration: Skin is pale.      Comments: See LE wounds in media.  Exposed areas surrounding dresses warm and reddened    HENT:      Head: Normocephalic and atraumatic.   Neck:      Trachea: Trachea normal.   Cardiovascular:      Rate and Rhythm: Normal rate and regular rhythm.      Pulses:           Radial pulses are 2+ on the right side and 2+ on the left side.        Dorsalis pedis pulses are 2+ on the right side and 2+ on the left side.      Heart sounds: Normal heart sounds, S1 normal and S2 normal.   Musculoskeletal:      Cervical back: Full passive range of motion without pain, normal range of motion and neck supple.      Comments: Normal ROM, LE weakness, normal UE strength    Abdominal: General: Bowel sounds are normal.      Palpations: Abdomen is soft.   Constitutional:       General: She is awake.      Appearance: Normal appearance. She is well-developed.   Pulmonary:      Effort: Pulmonary effort is normal.      Breath sounds: Normal breath sounds.   Psychiatric:         Attention and Perception: Attention and perception normal.         Mood and Affect: Mood and affect normal.         Speech: Speech normal.         Behavior: Behavior normal. Behavior is cooperative.         Thought Content: Thought content normal.         Cognition and Memory: Cognition and memory normal.         Judgment: Judgment normal.   Neurological:      General: No focal deficit present.      Mental Status: She is alert and oriented to person, place, and time.      GCS: GCS eye subscore is 4. GCS verbal subscore is 5. GCS motor subscore is 6.            Diagnostic Studies      No new imaging      Medications:  Scheduled PRN   cefepime, 2,000 mg, Q12H  cyanocobalamin, 1,000 mcg, Daily  enoxaparin, 40 mg, Q24H VIRGIL  ergocalciferol, 50,000 Units, Weekly  nystatin, , BID  saccharomyces boulardii, 250 mg, BID  sodium hypochlorite, 1 Application, Daily  thiamine, 100 mg, Daily  vancomycin, 1,500 mg, Q24H      acetaminophen, 650 mg, Q6H PRN       Continuous    norepinephrine, 1-30 mcg/min, Last Rate: 7  mcg/min (08/31/24 0041)         Labs:    CBC    Recent Labs     08/29/24 2058 08/30/24 0636   WBC 7.05 6.57   HGB 12.1 11.5   HCT 37.5 36.4    329   BANDSPCT  --  4     BMP    Recent Labs     08/30/24 0639 08/30/24 2038   SODIUM 133* 140   K 3.4* 3.6    112*   CO2 16* 21   AGAP 13 7   BUN 23 22   CREATININE 0.85 0.85   CALCIUM 7.4* 7.7*       Coags    No recent results     Additional Electrolytes  Recent Labs     08/29/24 1826 08/30/24 0636 08/30/24 2038   MG 2.3 2.0  --    PHOS  --  3.2  --    CAIONIZED  --  0.97* 1.09*          Blood Gas    No recent results  Recent Labs     08/30/24 2038   PHVEN 7.302   YIO7RGD 42.4   PO2VEN 18.8*   CDL0RMG 20.5*   BEVEN -5.6   [id number] 25.4*    LFTs  Recent Labs     08/29/24 1826   ALT 6*   AST 13   ALKPHOS 99   ALB 2.7*   TBILI 0.19*       Infectious  Recent Labs     08/29/24 1826 08/30/24 0639   PROCALCITONI 1.57* 1.37*     Glucose  Recent Labs     08/29/24 1826 08/29/24 2110 08/30/24 0639 08/30/24 2038   GLUC 91 85 69 116               LEEROY Turner

## 2024-08-31 NOTE — ASSESSMENT & PLAN NOTE
VBG 7.30/42.4/18.8/20.5/-5.6  Serum bicarb 16-->21   Vomiting and diarrhea at home  S/p IVFs  Trend VBG/BMP

## 2024-08-31 NOTE — ASSESSMENT & PLAN NOTE
VBG 7.30/42.4/18.8/20.5/-5.6  Serum bicarb 16-->21   Vomiting and diarrhea at home  S/p IVFs  Question role of hyperchloremia  Trend BMP

## 2024-08-31 NOTE — ASSESSMENT & PLAN NOTE
SIRS on presentation 8/28 with tachycardia, tachypnea, and leukocytosis  Lactic acid negative  Concern for bilateral lower extremity leg wounds as sepsis source  Continue cefepime/vancomycin D3   Systolic blood pressures in the 80s -appropriately fluid resuscitated but continued to be hypotensive  Transferred to stepdown unit on 8/30 for hypotension  Wound cx 4+ staph aureas  BC no growth at 48 hours   Given an additional 500mL 5% albumin overnight  Levo to maintain MAP > 65   Requiring 7-8mcg   Procal 1.57--1.37, trend  Trend temps and WBC   Echo 8/30: EF 60%  Cortisol 23, TSH 4.96, Free T4 1.03   Unclear etiology for ongoing SIRS state- consider leg raise to assess for volume responsiveness versus IVC measurement before and after fluid resuscitation

## 2024-08-31 NOTE — QUICK NOTE
Patient denies any need for anyone to be updated today.  She confirms her friend Liyah will be her emergency contact if need be.

## 2024-08-31 NOTE — ASSESSMENT & PLAN NOTE
POA-multiple open wounds on bilateral lower extremities with erythema and drainage  Continue cefepime/Vanco  Wound care following  Surgery following  Venous duplex negative  LAM unreliable study due to open wounds, severe pain, and patient position  PRN Tylenol for pain control

## 2024-08-31 NOTE — CASE MANAGEMENT
Case Management Assessment & Discharge Planning Note    Patient name Barbie Medel  Location ICU /ICU  MRN [id number]  : 1945 Date 2024       Current Admission Date: 2024  Current Admission Diagnosis:Septic shock (HCC)   Patient Active Problem List    Diagnosis Date Noted Date Diagnosed    Metabolic acidosis 2024     Fall 2024     Septic shock (HCC) 2024     Multiple open wounds of lower leg 2024     Ambulatory dysfunction 2024     Onychomycosis 2024       LOS (days): 2  Geometric Mean LOS (GMLOS) (days): 3.6  Days to GMLOS:1.5     OBJECTIVE:    Risk of Unplanned Readmission Score: 12.21         Current admission status: Inpatient     Preferred Pharmacy:   [id number] - [place], [place] - [address]  [address]  [facility] [address]  Phone: [phone] Fax: [phone]    Primary Care Provider: No primary care provider on file.    Primary Insurance: MEDICARE  Secondary Insurance:     ASSESSMENT:  Active Health Care Proxies    There are no active Health Care Proxies on file.       Readmission Root Cause  30 Day Readmission: No    Patient Information  Admitted from:: Home  Mental Status: Other (Comment) (Sleeping)  During Assessment patient was accompanied by: Not accompanied during assessment  Assessment information provided by:: Friend  Primary Caregiver: Friend  Caregiver's Name:: Liyah Gordon (friend)  Caregiver's Relationship to Patient:: Friend  Caregiver's Telephone Number:: [phone]  Support Systems: Friend, Other (Comment) (Niece)  County of Residence: [place]  What [facility] do you live in?: [place]  Type of Current Residence: 2 story home  Upon entering residence, is there a bedroom on the main floor (no further steps)?: Yes  Upon entering residence, is there a bathroom on the main floor (no further steps)?: Yes  Living Arrangements: Lives Alone    Activities of Daily Living Prior to Admission  Functional Status:  Assistance  Completes ADLs independently?: No  Level of ADL dependence: Assistance  Ambulates independently?: Yes  Does patient use assisted devices?: Yes  Assisted Devices (DME) used: Walker, Straight Cane  Does patient currently own DME?: Yes  What DME does the patient currently own?: Straight Cane, Walker  Does patient have a history of Outpatient Therapy (PT/OT)?: No  Does the patient have a history of Short-Term Rehab?: No  Does patient have a history of HHC?: No  Does patient currently have HHC?: No     Patient Information Continued  Income Source: Pension/long-term  Does patient have prescription coverage?: No  Does patient receive dialysis treatments?: No     Means of Transportation  Means of Transport to Appts:: None      Social Determinants of Health (SDOH)      Flowsheet Row Most Recent Value   Housing Stability    In the last 12 months, was there a time when you were not able to pay the mortgage or rent on time? N   In the past 12 months, how many times have you moved where you were living? 0   At any time in the past 12 months, were you homeless or living in a shelter (including now)? N   Transportation Needs    In the past 12 months, has lack of transportation kept you from medical appointments or from getting medications? yes   In the past 12 months, has lack of transportation kept you from meetings, work, or from getting things needed for daily living? Yes   Food Insecurity    Within the past 12 months, you worried that your food would run out before you got the money to buy more. Sometimes   Within the past 12 months, the food you bought just didn't last and you didn't have money to get more. Sometimes   Utilities    In the past 12 months has the electric, gas, oil, or water company threatened to shut off services in your home? No  [Patient gets assistance from [employer] for oil and electricity bill]            DISCHARGE DETAILS:    Discharge planning discussed with:: Patient's friend  Liyah  Freedom of Choice: Yes  Comments - Freedom of Choice: Liyah wants patient to go to rehab.  CM contacted family/caregiver?: Yes  Were Treatment Team discharge recommendations reviewed with patient/caregiver?: Yes  Did patient/caregiver verbalize understanding of patient care needs?: Yes  Were patient/caregiver advised of the risks associated with not following Treatment Team discharge recommendations?: Yes    Contacts  Patient Contacts: Liyah Gordon  Relationship to Patient:: Friend  Contact Method: Phone  Phone Number: [phone]  Reason/Outcome: Discharge Planning, Emergency Contact    Requested Home Health Care         Is the patient interested in HHC at discharge?: No    DME Referral Provided  Referral made for DME?: No    Other Referral/Resources/Interventions Provided:  Interventions: Short Term Rehab  Referral Comments: RN NU spoke with patient's friend Liyah over phone, introduced self and role, conduct assessment and discuss discharge planning. Per Liyah patient has not got out of her house in last 10 years, does not see any physician and does not take any medications. Her groceries are ordered by [employer] and delivered to her house by Liyah. At times, Liyah has paid for her groceries as well when patient did not have enough money. Patient sleeps in a recliner on 1st level. She had been refusing to seek medical attention when her legs were getting bad. Per Liyah patient gets assistance for oil and electricity bill from [employer]. Discussed STR per therapy recs and Liyah agreed that patient needs to go to rehab and probably for long term as well since she is not taking care for herself at home. Referrals sent in aidin for STR with likely transition to LTC, pending acceptance. CM will continue to follow with facility availability and clinical progress.     Treatment Team Recommendation: Short Term Rehab  Discharge Destination Plan:: Short Term Rehab  Transport at Discharge : Other  (Comment) (TBD pending clinical progress at discharge)

## 2024-08-31 NOTE — PROGRESS NOTES
Barbie Medel is a 78 y.o. female who is currently ordered Vancomycin IV with management by the Pharmacy Consult service.  Relevant clinical data and objective / subjective history reviewed.  Vancomycin Assessment:  Indication and Goal AUC/Trough: Soft tissue (goal -600, trough >10), sepsis, -600, trough >10  Micro:     Renal Function:  SCr: 0.79 mg/dL  CrCl: 65.2 mL/min  Renal replacement: Not on dialysis  Days of Therapy: 3  Current Dose: 1500 mg Q24H  Vancomycin Plan:  New Dosinmg IV q24h  Estimated AUC: 506 mcg*hr/mL  Estimated Trough: 14 mcg/mL  Next Level: 24 @0600  Renal Function Monitoring: Daily BMP and UOP  Pharmacy will continue to follow closely for s/sx of nephrotoxicity, infusion reactions and appropriateness of therapy.  BMP and CBC will be ordered per protocol. We will continue to follow the patient’s culture results and clinical progress daily.    Do Shabnam Harrington, Pharmacist

## 2024-08-31 NOTE — ASSESSMENT & PLAN NOTE
SIRS on presentation 8/28 with tachycardia, tachypnea, and leukocytosis  Lactic acid negative  Concern for bilateral lower extremity leg wounds as sepsis source  Continue cefepime/vancomycin D2   Systolic blood pressures in the 80s -appropriately fluid resuscitated but continued to be hypotensive  Transferred to stepdown unit on 8/30 for hypotension  Wound cx 4+ staph whitney  BC no growth at 48 hours   Given an additional 500mL 5% albumin overnight  Levo to maintain MAP > 65   Requiring 7-8mcg   Procal 1.57--1.37, trend  Trend temps and WBC   Echo 8/30: EF 60%  Cortisol 23, TSH 4.96, Free T4 1.03

## 2024-08-31 NOTE — PROGRESS NOTES
Patient:    MRN:  [id number]    Aidin Request ID:  [id number]    Level of care reserved:    Partner Reserved:    Clinical needs requested:    Geography searched:  30 miles around [id number]    Start of Service:    Request sent:  4:05pm EDT on 8/31/2024 by Fiorella Coley    Partner reserved:    Choice list shared:

## 2024-09-01 LAB
ANION GAP SERPL CALCULATED.3IONS-SCNC: 7 MMOL/L (ref 4–13)
BUN SERPL-MCNC: 16 MG/DL (ref 5–25)
CALCIUM SERPL-MCNC: 7.5 MG/DL (ref 8.4–10.2)
CHLORIDE SERPL-SCNC: 112 MMOL/L (ref 96–108)
CO2 SERPL-SCNC: 18 MMOL/L (ref 21–32)
CREAT SERPL-MCNC: 0.64 MG/DL (ref 0.6–1.3)
ERYTHROCYTE [DISTWIDTH] IN BLOOD BY AUTOMATED COUNT: 19.9 % (ref 11.6–15.1)
GFR SERPL CREATININE-BSD FRML MDRD: 86 ML/MIN/1.73SQ M
GLUCOSE SERPL-MCNC: 85 MG/DL (ref 65–140)
HCT VFR BLD AUTO: 33.8 % (ref 34.8–46.1)
HGB BLD-MCNC: 10.4 G/DL (ref 11.5–15.4)
MAGNESIUM SERPL-MCNC: 2.1 MG/DL (ref 1.9–2.7)
MCH RBC QN AUTO: 26.5 PG (ref 26.8–34.3)
MCHC RBC AUTO-ENTMCNC: 30.8 G/DL (ref 31.4–37.4)
MCV RBC AUTO: 86 FL (ref 82–98)
NRBC BLD AUTO-RTO: 0 /100 WBCS
PHOSPHATE SERPL-MCNC: 2.3 MG/DL (ref 2.3–4.1)
PLATELET # BLD AUTO: 269 THOUSANDS/UL (ref 149–390)
PMV BLD AUTO: 9.6 FL (ref 8.9–12.7)
POTASSIUM SERPL-SCNC: 3.9 MMOL/L (ref 3.5–5.3)
RBC # BLD AUTO: 3.93 MILLION/UL (ref 3.81–5.12)
SODIUM SERPL-SCNC: 137 MMOL/L (ref 135–147)
WBC # BLD AUTO: 6.97 THOUSAND/UL (ref 4.31–10.16)

## 2024-09-01 PROCEDURE — 85027 COMPLETE CBC AUTOMATED: CPT | Performed by: NURSE PRACTITIONER

## 2024-09-01 PROCEDURE — 80048 BASIC METABOLIC PNL TOTAL CA: CPT | Performed by: NURSE PRACTITIONER

## 2024-09-01 PROCEDURE — NC001 PR NO CHARGE: Performed by: PHYSICIAN ASSISTANT

## 2024-09-01 PROCEDURE — 83735 ASSAY OF MAGNESIUM: CPT | Performed by: NURSE PRACTITIONER

## 2024-09-01 PROCEDURE — 99232 SBSQ HOSP IP/OBS MODERATE 35: CPT | Performed by: INTERNAL MEDICINE

## 2024-09-01 PROCEDURE — 84100 ASSAY OF PHOSPHORUS: CPT | Performed by: NURSE PRACTITIONER

## 2024-09-01 RX ORDER — MIDODRINE HYDROCHLORIDE 5 MG/1
5 TABLET ORAL
Status: DISCONTINUED | OUTPATIENT
Start: 2024-09-01 | End: 2024-09-03

## 2024-09-01 RX ADMIN — Medication 250 MG: at 08:26

## 2024-09-01 RX ADMIN — Medication 2 TABLET: at 21:14

## 2024-09-01 RX ADMIN — Medication 2 TABLET: at 17:17

## 2024-09-01 RX ADMIN — Medication 2 TABLET: at 08:26

## 2024-09-01 RX ADMIN — THIAMINE HCL TAB 100 MG 100 MG: 100 TAB at 08:26

## 2024-09-01 RX ADMIN — CYANOCOBALAMIN TAB 500 MCG 1000 MCG: 500 TAB at 08:26

## 2024-09-01 RX ADMIN — MIDODRINE HYDROCHLORIDE 5 MG: 5 TABLET ORAL at 17:17

## 2024-09-01 RX ADMIN — NOREPINEPHRINE BITARTRATE 12 MCG/MIN: 1 INJECTION, SOLUTION, CONCENTRATE INTRAVENOUS at 10:10

## 2024-09-01 RX ADMIN — MIDODRINE HYDROCHLORIDE 5 MG: 5 TABLET ORAL at 11:07

## 2024-09-01 RX ADMIN — ENOXAPARIN SODIUM 40 MG: 40 INJECTION SUBCUTANEOUS at 08:39

## 2024-09-01 RX ADMIN — NYSTATIN: 100000 POWDER TOPICAL at 08:39

## 2024-09-01 RX ADMIN — NYSTATIN: 100000 POWDER TOPICAL at 17:17

## 2024-09-01 RX ADMIN — VANCOMYCIN HYDROCHLORIDE 1250 MG: 10 INJECTION, POWDER, LYOPHILIZED, FOR SOLUTION INTRAVENOUS at 13:31

## 2024-09-01 RX ADMIN — Medication 2 TABLET: at 11:07

## 2024-09-01 RX ADMIN — CEFEPIME HYDROCHLORIDE 2000 MG: 2 INJECTION, SOLUTION INTRAVENOUS at 08:26

## 2024-09-01 RX ADMIN — HYOSCYAMINE SULFATE 1 APPLICATION: 16 SOLUTION at 08:40

## 2024-09-01 RX ADMIN — Medication 250 MG: at 17:17

## 2024-09-01 NOTE — PROGRESS NOTES
Barbie Medel is a 78 y.o. female who is currently ordered Vancomycin IV with management by the Pharmacy Consult service.  Relevant clinical data and objective / subjective history reviewed.  Vancomycin Assessment:  Indication and Goal AUC/Trough: Soft tissue (goal -600, trough >10), sepsis, -600, trough >10  Clinical Status: stable  Micro:     Renal Function:  SCr: 0.64 mg/dL  CrCl: 81.6 mL/min  Renal replacement: Not on dialysis  Days of Therapy: 4  Current Dose: 1250mg IV q24h  Vancomycin Plan:  Continue 1250mg IV q24h  Estimated AUC: 419 mcg*hr/mL  Estimated Trough: 10.6 mcg/mL  Next Level: 9/6/24 @0600  Renal Function Monitoring: Daily BMP and UOP  Pharmacy will continue to follow closely for s/sx of nephrotoxicity, infusion reactions and appropriateness of therapy.  BMP and CBC will be ordered per protocol. We will continue to follow the patient’s culture results and clinical progress daily.    Do Shabnam Harrington, Pharmacist

## 2024-09-01 NOTE — QUICK NOTE
Patient does not wish to have anyone updated.   Confirms her friend Liyah is her emergency contact if need be.

## 2024-09-01 NOTE — PROGRESS NOTES
B/L LE venous stasis ulcers examined with RN at bedside. Pt reports a lot of pain with dressing change, R>L. Mild surrounding erythema, improved compared to prior pictures in chart. Wounds are clean. Cellulitis appears to be improving, leukocytosis has resolved. Pt afebrile. She remains on levophed.     No surgical intervention indicated. Continue local wound care and antibiotics.     Fadia Rothman  9/1/2024

## 2024-09-01 NOTE — PROGRESS NOTES
[facility]  Progress Note  Name: Barbie Medel I  MRN: [id number]  Unit/Bed#: ICU 01 I Date of Admission: 8/28/2024   Date of Service: 9/1/2024 I Hospital Day: 3    Assessment & Plan   * Septic shock (HCC)  Assessment & Plan  SIRS on presentation 8/28 with tachycardia, tachypnea, and leukocytosis  Lactic acid negative  Concern for bilateral lower extremity leg wounds as sepsis source  Continue cefepime/vancomycin D3   Systolic blood pressures in the 80s -appropriately fluid resuscitated but continued to be hypotensive  Transferred to stepdown unit on 8/30 for hypotension  Wound cx 4+ staph aureas  BC no growth at 48 hours   Given an additional 500mL 5% albumin overnight  Levo to maintain MAP > 65   Requiring 7-8mcg   Procal 1.57--1.37, trend  Trend temps and WBC   Echo 8/30: EF 60%  Cortisol 23, TSH 4.96, Free T4 1.03   Unclear etiology for ongoing SIRS state- consider leg raise to assess for volume responsiveness versus IVC measurement before and after fluid resuscitation    Metabolic acidosis  Assessment & Plan  VBG 7.30/42.4/18.8/20.5/-5.6  Serum bicarb 16-->21   Vomiting and diarrhea at home  S/p IVFs  Question role of hyperchloremia  Trend BMP    Multiple open wounds of lower leg  Assessment & Plan  POA-multiple open wounds on bilateral lower extremities with erythema and drainage  Continue cefepime/Vanco  Wound care following  Surgery following  Venous duplex negative  LAM unreliable study due to open wounds, severe pain, and patient position  PRN Tylenol for pain control    Fall  Assessment & Plan  Fall in her bathroom complaining of her legs giving out  Was on the floor for 1-1/2 hours before activating 911  CT head negative  CT cervical spine negative  CT chest/abdomen/pelvis - Minimally displaced anterior lateral right rib 10th fracture  PT OT  Fall precautions      Onychomycosis  Assessment & Plan  Noted in bilateral feet  Podiatry following    Ambulatory  dysfunction  Assessment & Plan  Uses walker at baseline  PT/OT         Disposition: Critical care    ICU Core Measures     A: Assess, Prevent, and Manage Pain Has pain been assessed? Yes  Need for changes to pain regimen? No   B: Both SAT/SAT  N/A   C: Choice of Sedation RASS Goal: N/A patient not on sedation  Need for changes to sedation or analgesia regimen? NA   D: Delirium CAM-ICU: Negative   E: Early Mobility  Plan for early mobility? Yes   F: Family Engagement Plan for family engagement today? Yes       Antibiotic Review: Awaiting culture results.       Prophylaxis:  VTE VTE covered by:  enoxaparin, Subcutaneous, 40 mg at 08/31/24 0838       Stress Ulcer  not ordered         Significant 24hr Events     24hr events: Patient given additional volume resuscitation, remains on variable amount of norepinephrine     Subjective   Review of Systems: Review of Systems   Constitutional:  Positive for fatigue.   Respiratory:  Negative for shortness of breath.    Cardiovascular:  Negative for chest pain.   Gastrointestinal:  Positive for abdominal pain (with bowel movement). Negative for diarrhea, nausea and vomiting.   Genitourinary:  Negative for dysuria.   Musculoskeletal:  Positive for arthralgias and back pain.   Neurological:  Positive for weakness.        Objective                            Vitals I/O      Most Recent Min/Max in 24hrs   Temp 98.2 °F (36.8 °C) Temp  Min: 97.3 °F (36.3 °C)  Max: 98.2 °F (36.8 °C)   Pulse 72 Pulse  Min: 61  Max: 80   Resp 22 Resp  Min: 15  Max: 34   BP 91/55 BP  Min: 70/42  Max: 121/56   O2 Sat 95 % SpO2  Min: 91 %  Max: 99 %      Intake/Output Summary (Last 24 hours) at 9/1/2024 0004  Last data filed at 8/31/2024 2350  Gross per 24 hour   Intake 1781.44 ml   Output 1350 ml   Net 431.44 ml       Diet Regular; Regular House    Invasive Monitoring           Physical Exam   Physical Exam  Eyes:      Pupils: Pupils are equal, round, and reactive to light.   Skin:     General: Skin is warm  and dry.      Comments: Erythema around sacrum  Bilateral lower extretmities erythematous   HENT:      Head: Normocephalic and atraumatic.      Mouth/Throat:      Mouth: Mucous membranes are dry.   Cardiovascular:      Rate and Rhythm: Normal rate and regular rhythm.   Musculoskeletal:         General: Tenderness (bilateral feet) and signs of injury (bilateral lower extremities with dressings in place) present.      Right lower le+ Edema present.      Left lower le+ Edema present.   Abdominal:      Palpations: Abdomen is soft.      Tenderness: There is no abdominal tenderness.   Constitutional:       General: She is not in acute distress.     Appearance: She is ill-appearing.   Pulmonary:      Effort: Pulmonary effort is normal. No respiratory distress.   Neurological:      GCS: GCS eye subscore is 4. GCS verbal subscore is 5. GCS motor subscore is 6.            Diagnostic Studies      EKG: Sinus rhythm  Imaging:  I have personally reviewed pertinent reports.   and I have personally reviewed pertinent films in PACS     Medications:  Scheduled PRN   cefepime, 2,000 mg, Q12H  cyanocobalamin, 1,000 mcg, Daily  enoxaparin, 40 mg, Q24H VIRGIL  ergocalciferol, 50,000 Units, Weekly  nystatin, , BID  saccharomyces boulardii, 250 mg, BID  sodium hypochlorite, 1 Application, Daily  thiamine, 100 mg, Daily  vancomycin, 1,250 mg, Q24H      acetaminophen, 650 mg, Q6H PRN       Continuous    norepinephrine, 1-30 mcg/min, Last Rate: 6 mcg/min (24 2215)         Labs:    CBC    Recent Labs     24  0557   WBC 6.57 7.67   HGB 11.5 10.3*   HCT 36.4 33.5*    367   BANDSPCT 4  --      BMP    Recent Labs     24  0557   SODIUM 140 139   K 3.6 4.2   * 114*   CO2 21 20*   AGAP 7 5   BUN 22 20   CREATININE 0.85 0.79   CALCIUM 7.7* 8.0*       Coags    No recent results     Additional Electrolytes  Recent Labs     24  0636 24  0557   MG 2.0  --  1.9    PHOS 3.2  --  2.8   CAIONIZED 0.97* 1.09* 1.10*          Blood Gas    No recent results  Recent Labs     08/31/24  0557   PHVEN 7.328   NVG4SBR 36.6*   PO2VEN 26.7*   PNX9CEW 18.8*   BEVEN -6.5   [id number] 49.6*    LFTs  No recent results    Infectious  Recent Labs     08/30/24  0639 08/31/24  0557   PROCALCITONI 1.37* 1.20*     Glucose  Recent Labs     08/30/24  0639 08/30/24 2038 08/31/24  0557   GLUC 69 116 89               LEEROY Lobo

## 2024-09-02 LAB
ANION GAP SERPL CALCULATED.3IONS-SCNC: 8 MMOL/L (ref 4–13)
BACTERIA BLD CULT: NORMAL
BACTERIA BLD CULT: NORMAL
BACTERIA WND AEROBE CULT: ABNORMAL
BACTERIA WND AEROBE CULT: ABNORMAL
BUN SERPL-MCNC: 16 MG/DL (ref 5–25)
CALCIUM SERPL-MCNC: 6.7 MG/DL (ref 8.4–10.2)
CHLORIDE SERPL-SCNC: 113 MMOL/L (ref 96–108)
CO2 SERPL-SCNC: 18 MMOL/L (ref 21–32)
CREAT SERPL-MCNC: 0.49 MG/DL (ref 0.6–1.3)
ERYTHROCYTE [DISTWIDTH] IN BLOOD BY AUTOMATED COUNT: 20.1 % (ref 11.6–15.1)
GFR SERPL CREATININE-BSD FRML MDRD: 94 ML/MIN/1.73SQ M
GLUCOSE SERPL-MCNC: 95 MG/DL (ref 65–140)
GRAM STN SPEC: ABNORMAL
HCT VFR BLD AUTO: 34.6 % (ref 34.8–46.1)
HGB BLD-MCNC: 10.6 G/DL (ref 11.5–15.4)
MAGNESIUM SERPL-MCNC: 1.9 MG/DL (ref 1.9–2.7)
MCH RBC QN AUTO: 26.6 PG (ref 26.8–34.3)
MCHC RBC AUTO-ENTMCNC: 30.6 G/DL (ref 31.4–37.4)
MCV RBC AUTO: 87 FL (ref 82–98)
NRBC BLD AUTO-RTO: 0 /100 WBCS
PHOSPHATE SERPL-MCNC: 2.8 MG/DL (ref 2.3–4.1)
PLATELET # BLD AUTO: 165 THOUSANDS/UL (ref 149–390)
PLATELET BLD QL SMEAR: ADEQUATE
PMV BLD AUTO: 9.5 FL (ref 8.9–12.7)
POTASSIUM SERPL-SCNC: 3.3 MMOL/L (ref 3.5–5.3)
RBC # BLD AUTO: 3.99 MILLION/UL (ref 3.81–5.12)
RBC MORPH BLD: NORMAL
SODIUM SERPL-SCNC: 139 MMOL/L (ref 135–147)
WBC # BLD AUTO: 6.53 THOUSAND/UL (ref 4.31–10.16)

## 2024-09-02 PROCEDURE — 99232 SBSQ HOSP IP/OBS MODERATE 35: CPT | Performed by: INTERNAL MEDICINE

## 2024-09-02 PROCEDURE — 84100 ASSAY OF PHOSPHORUS: CPT | Performed by: NURSE PRACTITIONER

## 2024-09-02 PROCEDURE — 85027 COMPLETE CBC AUTOMATED: CPT | Performed by: NURSE PRACTITIONER

## 2024-09-02 PROCEDURE — 83735 ASSAY OF MAGNESIUM: CPT | Performed by: NURSE PRACTITIONER

## 2024-09-02 PROCEDURE — 80048 BASIC METABOLIC PNL TOTAL CA: CPT | Performed by: NURSE PRACTITIONER

## 2024-09-02 RX ORDER — POTASSIUM CHLORIDE 1500 MG/1
40 TABLET, EXTENDED RELEASE ORAL ONCE
Status: COMPLETED | OUTPATIENT
Start: 2024-09-02 | End: 2024-09-02

## 2024-09-02 RX ORDER — VANCOMYCIN HYDROCHLORIDE 1 G/200ML
1000 INJECTION, SOLUTION INTRAVENOUS EVERY 12 HOURS
Status: DISPENSED | OUTPATIENT
Start: 2024-09-02 | End: 2024-09-06

## 2024-09-02 RX ORDER — MAGNESIUM SULFATE HEPTAHYDRATE 40 MG/ML
2 INJECTION, SOLUTION INTRAVENOUS ONCE
Status: COMPLETED | OUTPATIENT
Start: 2024-09-02 | End: 2024-09-02

## 2024-09-02 RX ORDER — SODIUM CHLORIDE, SODIUM GLUCONATE, SODIUM ACETATE, POTASSIUM CHLORIDE, MAGNESIUM CHLORIDE, SODIUM PHOSPHATE, DIBASIC, AND POTASSIUM PHOSPHATE .53; .5; .37; .037; .03; .012; .00082 G/100ML; G/100ML; G/100ML; G/100ML; G/100ML; G/100ML; G/100ML
500 INJECTION, SOLUTION INTRAVENOUS ONCE
Status: COMPLETED | OUTPATIENT
Start: 2024-09-02 | End: 2024-09-03

## 2024-09-02 RX ORDER — CALCIUM GLUCONATE 20 MG/ML
2 INJECTION, SOLUTION INTRAVENOUS ONCE
Status: COMPLETED | OUTPATIENT
Start: 2024-09-02 | End: 2024-09-02

## 2024-09-02 RX ORDER — ALBUMIN, HUMAN INJ 5% 5 %
25 SOLUTION INTRAVENOUS ONCE
Status: COMPLETED | OUTPATIENT
Start: 2024-09-02 | End: 2024-09-02

## 2024-09-02 RX ADMIN — VANCOMYCIN HYDROCHLORIDE 1000 MG: 1 INJECTION, SOLUTION INTRAVENOUS at 20:30

## 2024-09-02 RX ADMIN — Medication 250 MG: at 08:02

## 2024-09-02 RX ADMIN — MIDODRINE HYDROCHLORIDE 5 MG: 5 TABLET ORAL at 11:49

## 2024-09-02 RX ADMIN — NYSTATIN 1 APPLICATION: 100000 POWDER TOPICAL at 17:31

## 2024-09-02 RX ADMIN — CALCIUM GLUCONATE 2 G: 20 INJECTION, SOLUTION INTRAVENOUS at 07:54

## 2024-09-02 RX ADMIN — Medication 250 MG: at 17:29

## 2024-09-02 RX ADMIN — CYANOCOBALAMIN TAB 500 MCG 1000 MCG: 500 TAB at 08:02

## 2024-09-02 RX ADMIN — ALBUMIN (HUMAN) 25 G: 12.5 INJECTION, SOLUTION INTRAVENOUS at 10:35

## 2024-09-02 RX ADMIN — VANCOMYCIN HYDROCHLORIDE 1000 MG: 1 INJECTION, SOLUTION INTRAVENOUS at 09:00

## 2024-09-02 RX ADMIN — MAGNESIUM SULFATE HEPTAHYDRATE 2 G: 40 INJECTION, SOLUTION INTRAVENOUS at 10:38

## 2024-09-02 RX ADMIN — SODIUM CHLORIDE, SODIUM GLUCONATE, SODIUM ACETATE, POTASSIUM CHLORIDE, MAGNESIUM CHLORIDE, SODIUM PHOSPHATE, DIBASIC, AND POTASSIUM PHOSPHATE 500 ML: .53; .5; .37; .037; .03; .012; .00082 INJECTION, SOLUTION INTRAVENOUS at 23:21

## 2024-09-02 RX ADMIN — MIDODRINE HYDROCHLORIDE 5 MG: 5 TABLET ORAL at 17:00

## 2024-09-02 RX ADMIN — ENOXAPARIN SODIUM 40 MG: 40 INJECTION SUBCUTANEOUS at 08:01

## 2024-09-02 RX ADMIN — POTASSIUM CHLORIDE 40 MEQ: 1500 TABLET, EXTENDED RELEASE ORAL at 08:02

## 2024-09-02 RX ADMIN — NYSTATIN 1 APPLICATION: 100000 POWDER TOPICAL at 08:03

## 2024-09-02 RX ADMIN — HYOSCYAMINE SULFATE 1 APPLICATION: 16 SOLUTION at 08:03

## 2024-09-02 RX ADMIN — MIDODRINE HYDROCHLORIDE 5 MG: 5 TABLET ORAL at 06:15

## 2024-09-02 RX ADMIN — NOREPINEPHRINE BITARTRATE 5 MCG/MIN: 1 INJECTION, SOLUTION, CONCENTRATE INTRAVENOUS at 21:19

## 2024-09-02 RX ADMIN — THIAMINE HCL TAB 100 MG 100 MG: 100 TAB at 08:02

## 2024-09-02 RX ADMIN — NOREPINEPHRINE BITARTRATE 2 MCG/MIN: 1 INJECTION, SOLUTION, CONCENTRATE INTRAVENOUS at 01:18

## 2024-09-02 NOTE — ASSESSMENT & PLAN NOTE
POA-multiple open wounds on bilateral lower extremities with erythema and drainage  Continue Vanc as above   Wound care following  Surgery following -- no indication for surgical intervention at this time   8/28 Venous duplex negative  LAM unreliable study due to open wounds, severe pain, and patient position    Plan:  PRN Tylenol for pain control  Scheduled wound care

## 2024-09-02 NOTE — ASSESSMENT & PLAN NOTE
Fall in her bathroom complaining of her legs giving out  Was on the floor for 1-1/2 hours before activating 911  8/28 CT head negative  8/28 CT cervical spine negative  8/28 CT chest/abdomen/pelvis - Minimally displaced anterior lateral right rib 10th fracture  PT/OT, OOBTC  Fall precautions

## 2024-09-02 NOTE — PROGRESS NOTES
[facility]  Progress Note  Name: Barbie Medel I  MRN: [id number]  Unit/Bed#: ICU 01 I Date of Admission: 8/28/2024   Date of Service: 9/2/2024 I Hospital Day: 4    Assessment & Plan   * Septic shock (HCC)  Assessment & Plan  SIRS on presentation 8/28 with tachycardia, tachypnea, and leukocytosis  Lactic acid negative  Source: bilateral lower extremity leg wounds   Systolic blood pressures in the 80s -appropriately fluid resuscitated but continued to be hypotensive, and transferred to stepdown unit on 8/30 for hypotension  Wound cx 4+ staph aureas  BC x2 NG x4 days  Procal 1.57--1.37 --> 1.2  Trend temps and WBC   Echo 8/30: EF 60%  Cortisol 23, TSH 4.96, Free T4 1.03     Plan:   Cont levo for goal map >60  9/1 midodrine 5mg po tid added   Abx D6: Vanc 6  Surgery following for BL LE wounds -- no need for surgical intervention at this time    Metabolic acidosis  Assessment & Plan  VBG 7.30/42.4/18.8/20.5/-5.6  Serum bicarb 16-->21   Vomiting and diarrhea at home  S/p IVF resuscitation   Question role of hyperchloremia    Plan:  Trend BMP  Hold further fluid resuscitation     Multiple open wounds of lower leg  Assessment & Plan  POA-multiple open wounds on bilateral lower extremities with erythema and drainage  Continue Vanc as above   Wound care following  Surgery following -- no indication for surgical intervention at this time   8/28 Venous duplex negative  LAM unreliable study due to open wounds, severe pain, and patient position    Plan:  PRN Tylenol for pain control  Scheduled wound care     Fall  Assessment & Plan  Fall in her bathroom complaining of her legs giving out  Was on the floor for 1-1/2 hours before activating 911  8/28 CT head negative  8/28 CT cervical spine negative  8/28 CT chest/abdomen/pelvis - Minimally displaced anterior lateral right rib 10th fracture  PT/OT, OOBTC  Fall precautions      Onychomycosis  Assessment & Plan  Noted in bilateral feet  Podiatry  following    Ambulatory dysfunction  Assessment & Plan  Uses walker at baseline  PT/OT, fall precautions              Disposition: Critical care    ICU Core Measures     A: Assess, Prevent, and Manage Pain Has pain been assessed? Yes  Need for changes to pain regimen? No   B: Both SAT/SAT  N/A   C: Choice of Sedation RASS Goal: N/A patient not on sedation  Need for changes to sedation or analgesia regimen? NA   D: Delirium CAM-ICU: Negative   E: Early Mobility  Plan for early mobility? Yes   F: Family Engagement Plan for family engagement today? Yes       Antibiotic Review: Patient on appropriate coverage based on culture data.       Prophylaxis:  VTE VTE covered by:  enoxaparin, Subcutaneous, 40 mg at 09/01/24 0839       Stress Ulcer  not ordered         Significant 24hr Events     24hr events: Yesterday, Barbie was OOBTC and she states her appetite is so-so. She continues on Vancomycin. Levo slowly being weaned for goal MAP >60 after addition of midodrine TID yesterday, however bp remains labile. She endorses L ankle pain on PE, but overall feels she is improving since her admission.      Subjective   Review of Systems: See HPI for Review of Systems     Objective                            Vitals I/O      Most Recent Min/Max in 24hrs   Temp 97.8 °F (36.6 °C) Temp  Min: 96.4 °F (35.8 °C)  Max: 98.4 °F (36.9 °C)   Pulse 67 Pulse  Min: 57  Max: 121   Resp 17 Resp  Min: 14  Max: 33   BP (!) 87/53 BP  Min: 72/53  Max: 137/70   O2 Sat 96 % SpO2  Min: 92 %  Max: 99 %      Intake/Output Summary (Last 24 hours) at 9/2/2024 0328  Last data filed at 9/2/2024 0120  Gross per 24 hour   Intake 823.77 ml   Output 1525 ml   Net -701.23 ml       Diet Regular; Regular House    Invasive Monitoring           Physical Exam   Physical Exam  Vitals and nursing note reviewed.   Skin:     General: Skin is warm.      Capillary Refill: Capillary refill takes less than 2 seconds.      Coloration: Skin is not pale.      Comments: BL LE with  intact dressings to the level of mid-calf, intact BL LE sensation    HENT:      Head: Normocephalic and atraumatic.      Mouth/Throat:      Lips: Pink.      Mouth: Mucous membranes are dry.   Cardiovascular:      Rate and Rhythm: Normal rate and regular rhythm.   Musculoskeletal:      Right lower leg: No edema.      Left lower leg: No edema.   Abdominal: General: Abdomen is flat. Bowel sounds are decreased.      Palpations: Abdomen is soft.      Tenderness: There is no abdominal tenderness.   Constitutional:       General: She is awake. She is not in acute distress.     Appearance: She is well-developed. She is obese. She is ill-appearing. She is not toxic-appearing or diaphoretic.   Pulmonary:      Effort: Pulmonary effort is normal.      Breath sounds: Normal breath sounds.   Psychiatric:         Behavior: Behavior is cooperative.   Neurological:      Mental Status: She is alert.      GCS: GCS eye subscore is 4. GCS verbal subscore is 5. GCS motor subscore is 6.   Genitourinary/Anorectal:     Comments: Voiding independently            Diagnostic Studies      EKG: NSR on tele   Imaging:   XR tibia fibula 2 vw left   Final Result      No acute osseous abnormality.      No radiographic findings of osteomyelitis.            Workstation performed: [id number]         XR tibia fibula 2 vw right   Final Result      No acute osseous abnormality.      No radiographic findings of osteomyelitis.            Workstation performed: [id number]         XR ankle 2 vw right   Final Result      No acute osseous abnormality.               Workstation performed: [id number]         XR ankle 2 vw left   Final Result      No acute osseous abnormality.      No radiographic findings of osteomyelitis.            Workstation performed: [id number]         XR foot 2 vw left   Final Result      No acute osseous abnormality.      No radiographic findings of osteomyelitis.         Workstation performed: [id number]         XR foot 2 vw right   Final  Result      No acute osseous abnormality.      No radiographic findings of osteomyelitis.   .         Workstation performed: [id number]         VAS LAM & waveform analysis, multiple levels   Final Result       VAS VENOUS DUPLEX - LOWER LIMB BILATERAL   Final Result      CT head without contrast   Final Result      1.  No acute intracranial abnormality.   2.  Chronic microangiopathic changes.   3.  Significant sinus mucosal disease of the maxillary sinuses.                  Workstation performed: [id number]         CT cervical spine without contrast   Final Result      1.  No cervical spine fracture or traumatic malalignment.   2.  Severe multilevel degenerative change.   3.  Diminished osseous trabeculation most pronounced at the C6 vertebral body is likely due to low mineral density.               Workstation performed: [id number]         CT chest abdomen pelvis w contrast   Final Result   1.  Age-indeterminate minimally displaced anterolateral right 10th rib fracture. 2.2 cm region of hypoattenuation in the inferoposterior right hepatic lobe is favored to represent a prominent diaphragmatic slip (normal anatomy), whereas a laceration is    considered less likely despite the slightly more inferior age-indeterminate anterolateral right 10th rib fracture.   2.  Interstitial reticulation in the right lower lobe is likely atelectasis given the elevation of the adjacent diaphragm.                  Workstation performed: [id number]            I have personally reviewed pertinent reports.       Medications:  Scheduled PRN   cyanocobalamin, 1,000 mcg, Daily  enoxaparin, 40 mg, Q24H VIRGIL  ergocalciferol, 50,000 Units, Weekly  midodrine, 5 mg, TID AC  nystatin, , BID  saccharomyces boulardii, 250 mg, BID  sodium hypochlorite, 1 Application, Daily  thiamine, 100 mg, Daily  vancomycin, 1,250 mg, Q24H      acetaminophen, 650 mg, Q6H PRN       Continuous    norepinephrine, 1-30 mcg/min, Last Rate: 4 mcg/min (09/02/24 0215)          Labs:    CBC    Recent Labs     08/31/24  0557 09/01/24  0515   WBC 7.67 6.97   HGB 10.3* 10.4*   HCT 33.5* 33.8*    269     BMP    Recent Labs     08/31/24  0557 09/01/24  0515   SODIUM 139 137   K 4.2 3.9   * 112*   CO2 20* 18*   AGAP 5 7   BUN 20 16   CREATININE 0.79 0.64   CALCIUM 8.0* 7.5*       Coags    No recent results     Additional Electrolytes  Recent Labs     08/31/24  0557 09/01/24  0515   MG 1.9 2.1   PHOS 2.8 2.3   CAIONIZED 1.10*  --           Blood Gas    No recent results  Recent Labs     08/31/24  0557   PHVEN 7.328   [id number] 36.6*   PO2VEN 26.7*   [id number] 18.8*   BEVEN -6.5   [id number] 49.6*    LFTs  No recent results    Infectious  Recent Labs     08/31/24  0557   PROCALCITONI 1.20*     Glucose  Recent Labs     08/31/24  0557 09/01/24  0515   GLUC 89 85               LEEROY Love

## 2024-09-02 NOTE — PROGRESS NOTES
[facility]  Progress Note  Name: Barbie Medel I  MRN: [id number]  Unit/Bed#: ICU 01 I Date of Admission: 8/28/2024   Date of Service: 9/3/2024 I Hospital Day: 5    Assessment & Plan   * Septic shock (HCC)  Assessment & Plan  SIRS on presentation 8/28 with tachycardia, tachypnea, and leukocytosis  Lactic acid negative on admission   Source: bilateral lower extremity leg wounds   Systolic blood pressures in the 80s -appropriately fluid resuscitated but continued to be hypotensive, and transferred to stepdown unit on 8/30 for hypotension  Wound cx 4+ staph aureas  BC x2 NG x5 days  Procal down trending   Trend temps and WBC   Echo 8/30: EF 60%  Cortisol 23, TSH 4.96, Free T4 1.03     Plan:   Cont levo for goal map >60  9/1 midodrine 5mg po tid added   Abx D7: Vanc 7  Surgery following for BL LE wounds -- no need for surgical intervention at this time    Metabolic acidosis  Assessment & Plan  VBG 7.30/42.4/18.8/20.5/-5.6  Serum bicarb 16-->21   Vomiting and diarrhea at home  S/p IVF resuscitation   Question role of hyperchloremia    Plan:  Trend BMP  Hold further fluid resuscitation     Multiple open wounds of lower leg  Assessment & Plan  POA-multiple open wounds on bilateral lower extremities with erythema and drainage  Continue Vanc as above   Wound care following  Surgery following -- no indication for surgical intervention at this time   8/28 Venous duplex negative  LAM unreliable study due to open wounds, severe pain, and patient position    Plan:  PRN Tylenol for pain control  Scheduled wound care     Fall  Assessment & Plan  Fall in her bathroom complaining of her legs giving out  Was on the floor for 1-1/2 hours before activating 911  8/28 CT head negative  8/28 CT cervical spine negative  8/28 CT chest/abdomen/pelvis - Minimally displaced anterior lateral right rib 10th fracture  PT/OT, OOBTC  Fall precautions      Onychomycosis  Assessment & Plan  Noted in bilateral feet  Podiatry  following    Ambulatory dysfunction  Assessment & Plan  Uses walker at baseline  PT/OT, fall precautions              Disposition: Critical care    ICU Core Measures     A: Assess, Prevent, and Manage Pain Has pain been assessed? Yes  Need for changes to pain regimen? No   B: Both SAT/SAT  N/A   C: Choice of Sedation RASS Goal: N/A patient not on sedation  Need for changes to sedation or analgesia regimen? NA   D: Delirium CAM-ICU: Negative   E: Early Mobility  Plan for early mobility? Yes   F: Family Engagement Plan for family engagement today? Yes       Antibiotic Review: Patient on appropriate coverage based on culture data.       Prophylaxis:  VTE VTE covered by:  enoxaparin, Subcutaneous, 40 mg at 09/02/24 0801       Stress Ulcer  not ordered         Significant 24hr Events     24hr events: Yesterday, 500mL albumin was trialed with no change in vasopressor requirement. She continues on midodrine tid and low dose levophed. Overnight, she required a max dose of levo 7, and received additional isolyte [id number] with mild improvement in pressor requirements. She declined OOBTC yesterday 2/2 leg pain, but voices desire to get into chair today.      Subjective   Review of Systems: See HPI for Review of Systems     Objective                            Vitals I/O      Most Recent Min/Max in 24hrs   Temp 97.5 °F (36.4 °C) Temp  Min: 97.4 °F (36.3 °C)  Max: 97.6 °F (36.4 °C)   Pulse 61 Pulse  Min: 51  Max: 91   Resp 20 Resp  Min: 13  Max: 35   BP 96/55 BP  Min: 71/39  Max: 124/58   O2 Sat 96 % SpO2  Min: 93 %  Max: 99 %      Intake/Output Summary (Last 24 hours) at 9/3/2024 0328  Last data filed at 9/3/2024 0021  Gross per 24 hour   Intake 1676.27 ml   Output 460 ml   Net 1216.27 ml       Diet Regular; Regular House    Invasive Monitoring           Physical Exam   Physical Exam  Vitals and nursing note reviewed.   Skin:     General: Skin is warm.      Coloration: Skin is pale.   HENT:      Head: Normocephalic and  atraumatic.      Mouth/Throat:      Lips: Pink.      Mouth: Mucous membranes are dry.   Cardiovascular:      Rate and Rhythm: Normal rate and regular rhythm.   Musculoskeletal:      Right lower leg: No edema.      Left lower leg: No edema.      Comments: BL LE with intact dressings,some serous discharge noted on bed under legs    Abdominal: General: Abdomen is flat. Bowel sounds are decreased.      Palpations: Abdomen is soft.      Tenderness: There is no abdominal tenderness.   Constitutional:       General: She is awake. She is not in acute distress.     Appearance: She is obese. She is ill-appearing.   Pulmonary:      Effort: Pulmonary effort is normal.      Breath sounds: Decreased breath sounds present.   Psychiatric:         Behavior: Behavior is cooperative.   Neurological:      General: No focal deficit present.      Mental Status: She is alert and oriented to person, place, and time.      GCS: GCS eye subscore is 4. GCS verbal subscore is 5. GCS motor subscore is 6.   Genitourinary/Anorectal:     Comments: Voiding independently            Diagnostic Studies      EKG: NSR on tele   Imaging:   XR tibia fibula 2 vw left   Final Result      No acute osseous abnormality.      No radiographic findings of osteomyelitis.            Workstation performed: [id number]         XR tibia fibula 2 vw right   Final Result      No acute osseous abnormality.      No radiographic findings of osteomyelitis.            Workstation performed: [id number]         XR ankle 2 vw right   Final Result      No acute osseous abnormality.               Workstation performed: [id number]         XR ankle 2 vw left   Final Result      No acute osseous abnormality.      No radiographic findings of osteomyelitis.            Workstation performed: [id number]         XR foot 2 vw left   Final Result      No acute osseous abnormality.      No radiographic findings of osteomyelitis.         Workstation performed: [id number]         XR foot 2 vw right    Final Result      No acute osseous abnormality.      No radiographic findings of osteomyelitis.   .         Workstation performed: [id number]         VAS LAM & waveform analysis, multiple levels   Final Result       VAS VENOUS DUPLEX - LOWER LIMB BILATERAL   Final Result      CT head without contrast   Final Result      1.  No acute intracranial abnormality.   2.  Chronic microangiopathic changes.   3.  Significant sinus mucosal disease of the maxillary sinuses.                  Workstation performed: [id number]         CT cervical spine without contrast   Final Result      1.  No cervical spine fracture or traumatic malalignment.   2.  Severe multilevel degenerative change.   3.  Diminished osseous trabeculation most pronounced at the C6 vertebral body is likely due to low mineral density.               Workstation performed: [id number]         CT chest abdomen pelvis w contrast   Final Result   1.  Age-indeterminate minimally displaced anterolateral right 10th rib fracture. 2.2 cm region of hypoattenuation in the inferoposterior right hepatic lobe is favored to represent a prominent diaphragmatic slip (normal anatomy), whereas a laceration is    considered less likely despite the slightly more inferior age-indeterminate anterolateral right 10th rib fracture.   2.  Interstitial reticulation in the right lower lobe is likely atelectasis given the elevation of the adjacent diaphragm.                  Workstation performed: [id number]            I have personally reviewed pertinent reports.       Medications:  Scheduled PRN   cyanocobalamin, 1,000 mcg, Daily  enoxaparin, 40 mg, Q24H VIRGIL  ergocalciferol, 50,000 Units, Weekly  midodrine, 5 mg, TID AC  nystatin, , BID  saccharomyces boulardii, 250 mg, BID  sodium hypochlorite, 1 Application, Daily  thiamine, 100 mg, Daily  vancomycin, 1,000 mg, Q12H      acetaminophen, 650 mg, Q6H PRN       Continuous    norepinephrine, 1-30 mcg/min, Last Rate: 5 mcg/min (09/03/24  0000)         Labs:    CBC    Recent Labs     09/01/24  0515 09/02/24  0527   WBC 6.97 6.53   HGB 10.4* 10.6*   HCT 33.8* 34.6*    165     BMP    Recent Labs     09/01/24  0515 09/02/24  0527   SODIUM 137 139   K 3.9 3.3*   * 113*   CO2 18* 18*   AGAP 7 8   BUN 16 16   CREATININE 0.64 0.49*   CALCIUM 7.5* 6.7*       Coags    No recent results     Additional Electrolytes  Recent Labs     09/01/24  0515 09/02/24  0527   MG 2.1 1.9   PHOS 2.3 2.8          Blood Gas    No recent results  No recent results LFTs  No recent results    Infectious  No recent results  Glucose  Recent Labs     09/01/24  0515 09/02/24  0527   GLUC 85 95               LEEROY Love

## 2024-09-02 NOTE — ASSESSMENT & PLAN NOTE
SIRS on presentation 8/28 with tachycardia, tachypnea, and leukocytosis  Lactic acid negative  Source: bilateral lower extremity leg wounds   Systolic blood pressures in the 80s -appropriately fluid resuscitated but continued to be hypotensive, and transferred to stepdown unit on 8/30 for hypotension  Wound cx 4+ staph aureas  BC x2 NG x4 days  Procal 1.57--1.37 --> 1.2  Trend temps and WBC   Echo 8/30: EF 60%  Cortisol 23, TSH 4.96, Free T4 1.03     Plan:   Cont levo for goal map >60  9/1 midodrine 5mg po tid added   Abx D6: Vanc 6  Surgery following for BL LE wounds -- no need for surgical intervention at this time

## 2024-09-02 NOTE — ASSESSMENT & PLAN NOTE
VBG 7.30/42.4/18.8/20.5/-5.6  Serum bicarb 16-->21   Vomiting and diarrhea at home  S/p IVF resuscitation   Question role of hyperchloremia    Plan:  Trend BMP  Hold further fluid resuscitation

## 2024-09-02 NOTE — ASSESSMENT & PLAN NOTE
SIRS on presentation 8/28 with tachycardia, tachypnea, and leukocytosis  Lactic acid negative on admission   Source: bilateral lower extremity leg wounds   Systolic blood pressures in the 80s -appropriately fluid resuscitated but continued to be hypotensive, and transferred to stepdown unit on 8/30 for hypotension  Wound cx 4+ staph aureas  BC x2 NG x5 days  Procal down trending   Trend temps and WBC   Echo 8/30: EF 60%  Cortisol 23, TSH 4.96, Free T4 1.03     Plan:   Cont levo for goal map >60  9/1 midodrine 5mg po tid added   Abx D7: Vanc 7  Surgery following for BL LE wounds -- no need for surgical intervention at this time

## 2024-09-03 ENCOUNTER — APPOINTMENT (INPATIENT)
Dept: RADIOLOGY | Facility: HOSPITAL | Age: 79
DRG: 871 | End: 2024-09-03
Payer: MEDICARE

## 2024-09-03 PROBLEM — E87.20 METABOLIC ACIDOSIS: Status: RESOLVED | Noted: 2024-08-31 | Resolved: 2024-09-03

## 2024-09-03 LAB
ANION GAP SERPL CALCULATED.3IONS-SCNC: 4 MMOL/L (ref 4–13)
ANISOCYTOSIS BLD QL SMEAR: PRESENT
BASOPHILS # BLD MANUAL: 0 THOUSAND/UL (ref 0–0.1)
BASOPHILS NFR MAR MANUAL: 0 % (ref 0–1)
BUN SERPL-MCNC: 16 MG/DL (ref 5–25)
CA-I BLD-SCNC: 1.01 MMOL/L (ref 1.12–1.32)
CALCIUM SERPL-MCNC: 7.6 MG/DL (ref 8.4–10.2)
CHLORIDE SERPL-SCNC: 112 MMOL/L (ref 96–108)
CO2 SERPL-SCNC: 23 MMOL/L (ref 21–32)
CREAT SERPL-MCNC: 0.46 MG/DL (ref 0.6–1.3)
EOSINOPHIL # BLD MANUAL: 0.13 THOUSAND/UL (ref 0–0.4)
EOSINOPHIL NFR BLD MANUAL: 2 % (ref 0–6)
ERYTHROCYTE [DISTWIDTH] IN BLOOD BY AUTOMATED COUNT: 20.3 % (ref 11.6–15.1)
GFR SERPL CREATININE-BSD FRML MDRD: 96 ML/MIN/1.73SQ M
GLUCOSE SERPL-MCNC: 101 MG/DL (ref 65–140)
HCT VFR BLD AUTO: 35.4 % (ref 34.8–46.1)
HGB BLD-MCNC: 11.2 G/DL (ref 11.5–15.4)
LYMPHOCYTES # BLD AUTO: 0.96 THOUSAND/UL (ref 0.6–4.47)
LYMPHOCYTES # BLD AUTO: 15 % (ref 14–44)
MAGNESIUM SERPL-MCNC: 2.2 MG/DL (ref 1.9–2.7)
MCH RBC QN AUTO: 26.8 PG (ref 26.8–34.3)
MCHC RBC AUTO-ENTMCNC: 31.6 G/DL (ref 31.4–37.4)
MCV RBC AUTO: 85 FL (ref 82–98)
MONOCYTES # BLD AUTO: 0.32 THOUSAND/UL (ref 0–1.22)
MONOCYTES NFR BLD: 5 % (ref 4–12)
MYELOCYTE ABSOLUTE CT: 0.13 THOUSAND/UL (ref 0–0.1)
MYELOCYTES NFR BLD MANUAL: 2 % (ref 0–1)
NEUTROPHILS # BLD MANUAL: 4.86 THOUSAND/UL (ref 1.85–7.62)
NEUTS BAND NFR BLD MANUAL: 1 % (ref 0–8)
NEUTS SEG NFR BLD AUTO: 75 % (ref 43–75)
PHOSPHATE SERPL-MCNC: 2.5 MG/DL (ref 2.3–4.1)
PLATELET # BLD AUTO: 333 THOUSANDS/UL (ref 149–390)
PLATELET BLD QL SMEAR: ADEQUATE
PMV BLD AUTO: 9.5 FL (ref 8.9–12.7)
POLYCHROMASIA BLD QL SMEAR: PRESENT
POTASSIUM SERPL-SCNC: 3.8 MMOL/L (ref 3.5–5.3)
PROCALCITONIN SERPL-MCNC: 0.12 NG/ML
RBC # BLD AUTO: 4.18 MILLION/UL (ref 3.81–5.12)
RBC MORPH BLD: PRESENT
SODIUM SERPL-SCNC: 139 MMOL/L (ref 135–147)
WBC # BLD AUTO: 6.4 THOUSAND/UL (ref 4.31–10.16)

## 2024-09-03 PROCEDURE — 85027 COMPLETE CBC AUTOMATED: CPT

## 2024-09-03 PROCEDURE — 84100 ASSAY OF PHOSPHORUS: CPT

## 2024-09-03 PROCEDURE — 80048 BASIC METABOLIC PNL TOTAL CA: CPT

## 2024-09-03 PROCEDURE — 82330 ASSAY OF CALCIUM: CPT

## 2024-09-03 PROCEDURE — 85007 BL SMEAR W/DIFF WBC COUNT: CPT

## 2024-09-03 PROCEDURE — 83735 ASSAY OF MAGNESIUM: CPT

## 2024-09-03 PROCEDURE — 99232 SBSQ HOSP IP/OBS MODERATE 35: CPT | Performed by: SPECIALIST

## 2024-09-03 PROCEDURE — 99291 CRITICAL CARE FIRST HOUR: CPT | Performed by: ANESTHESIOLOGY

## 2024-09-03 PROCEDURE — 73701 CT LOWER EXTREMITY W/DYE: CPT

## 2024-09-03 PROCEDURE — 84145 PROCALCITONIN (PCT): CPT | Performed by: NURSE PRACTITIONER

## 2024-09-03 RX ORDER — ALBUMIN (HUMAN) 12.5 G/50ML
12.5 SOLUTION INTRAVENOUS EVERY 8 HOURS
Status: COMPLETED | OUTPATIENT
Start: 2024-09-03 | End: 2024-09-05

## 2024-09-03 RX ORDER — POTASSIUM CHLORIDE 1500 MG/1
40 TABLET, EXTENDED RELEASE ORAL ONCE
Status: COMPLETED | OUTPATIENT
Start: 2024-09-03 | End: 2024-09-03

## 2024-09-03 RX ORDER — CALCIUM GLUCONATE 20 MG/ML
1 INJECTION, SOLUTION INTRAVENOUS ONCE
Status: COMPLETED | OUTPATIENT
Start: 2024-09-03 | End: 2024-09-03

## 2024-09-03 RX ORDER — MIDODRINE HYDROCHLORIDE 5 MG/1
10 TABLET ORAL
Status: DISCONTINUED | OUTPATIENT
Start: 2024-09-03 | End: 2024-09-07 | Stop reason: HOSPADM

## 2024-09-03 RX ADMIN — ENOXAPARIN SODIUM 40 MG: 40 INJECTION SUBCUTANEOUS at 08:02

## 2024-09-03 RX ADMIN — NOREPINEPHRINE BITARTRATE 5 MCG/MIN: 1 INJECTION, SOLUTION, CONCENTRATE INTRAVENOUS at 10:04

## 2024-09-03 RX ADMIN — NYSTATIN 1 APPLICATION: 100000 POWDER TOPICAL at 08:03

## 2024-09-03 RX ADMIN — ALBUMIN (HUMAN) 12.5 G: 0.25 INJECTION, SOLUTION INTRAVENOUS at 17:18

## 2024-09-03 RX ADMIN — VANCOMYCIN HYDROCHLORIDE 1000 MG: 1 INJECTION, SOLUTION INTRAVENOUS at 09:21

## 2024-09-03 RX ADMIN — NYSTATIN 1 APPLICATION: 100000 POWDER TOPICAL at 17:21

## 2024-09-03 RX ADMIN — HYOSCYAMINE SULFATE 1 APPLICATION: 16 SOLUTION at 08:03

## 2024-09-03 RX ADMIN — MIDODRINE HYDROCHLORIDE 5 MG: 5 TABLET ORAL at 06:18

## 2024-09-03 RX ADMIN — POTASSIUM CHLORIDE 40 MEQ: 1500 TABLET, EXTENDED RELEASE ORAL at 07:59

## 2024-09-03 RX ADMIN — IOHEXOL 100 ML: 350 INJECTION, SOLUTION INTRAVENOUS at 12:11

## 2024-09-03 RX ADMIN — Medication 250 MG: at 08:03

## 2024-09-03 RX ADMIN — MIDODRINE HYDROCHLORIDE 10 MG: 5 TABLET ORAL at 16:11

## 2024-09-03 RX ADMIN — CYANOCOBALAMIN TAB 500 MCG 1000 MCG: 500 TAB at 08:03

## 2024-09-03 RX ADMIN — ALBUMIN (HUMAN) 12.5 G: 0.25 INJECTION, SOLUTION INTRAVENOUS at 10:02

## 2024-09-03 RX ADMIN — THIAMINE HCL TAB 100 MG 100 MG: 100 TAB at 08:03

## 2024-09-03 RX ADMIN — Medication 250 MG: at 17:21

## 2024-09-03 RX ADMIN — MIDODRINE HYDROCHLORIDE 10 MG: 5 TABLET ORAL at 12:30

## 2024-09-03 RX ADMIN — CALCIUM GLUCONATE 1 G: 20 INJECTION, SOLUTION INTRAVENOUS at 07:59

## 2024-09-03 RX ADMIN — VANCOMYCIN HYDROCHLORIDE 1000 MG: 1 INJECTION, SOLUTION INTRAVENOUS at 20:27

## 2024-09-03 NOTE — PROGRESS NOTES
Progress Note - General Surgery   Barbie Medle 78 y.o. female MRN: [id number]  Unit/Bed#: ICU 01 Encounter: [id number]    Assessment:  1) lower extremity wounds with concurrent cellulitis -consistent findings with that of an acute exacerbation of venous stasis dermatitis, peers to have acute inflammatory components that are improving secondary to venous stasis and hemosiderin deposits, ulcers look venous in nature, tenderness around ulcerations, healthy bleeding borders and granulation tissue healthy appearing, no signs suggestive of abscess, I do not believe that this is the cause of hypotension    Plan:  1)   -Discussed and coordinated with critical care  -Can perform lower extremity CT scan with IV contrast, rule out abscess  -IV antibiotics  -Management of hypotension per the critical care team  -No plans for acute surgical intervention  -Local wound care per nursing and current orders    Subjective/Objective   Chief Complaint: I am on the bed pan but after that you can take a look at my legs    Subjective: Patient was seen and examined at bedside.  Patient denies any acute events overnight.  Patient does have soreness/pain overlying the lower extremities on her wounds.  Patient reports that she does not know the exact timing but has been dealing with these wounds for weeks to months.  Patient did not notice these wounds as a new or acute on onset from her weakness and subsequent fall on the floor.  Patient does not have a history of venous ulcers or any wounds on her lower extremities.  Patient does not have any peripheral arterial disease or smoking history.  Patient's wounds to lose.  Patient does not have significant or worsening lower extremity swelling.  Patient does note that the swelling is reduced compared to admission.  Patient also notes the redness has decreased as well.  Patient is uncertain of the source of infections.  Patient denies any abdominal pain.  Patient denies any nausea or vomiting  "or changes in bowel movements.  Patient denies any chest pain, palpitations, racing heart, shortness of breath, dyspnea on exertion.  Patient's primary reason for admission which she was getting/profoundly weak and fell onto the floor and was able to call 911.  Patient denies any other symptoms at this current moment in time.    Objective:     Blood pressure 94/54, pulse 71, temperature 97.5 °F (36.4 °C), temperature source Temporal, resp. rate 21, height 5' 4\" (1.626 m), weight 97.1 kg (214 lb 1.1 oz), SpO2 97%.,Body mass index is 36.74 kg/m².      Intake/Output Summary (Last 24 hours) at 9/3/2024 1152  Last data filed at 9/3/2024 1010  Gross per 24 hour   Intake 1265.58 ml   Output 670 ml   Net 595.58 ml       Invasive Devices       Peripheral Intravenous Line  Duration             Long-Dwell Peripheral IV (Midline) 08/30/24 Left Brachial 4 days                    Physical Exam: BP 94/54   Pulse 71   Temp 97.5 °F (36.4 °C) (Temporal)   Resp 21   Ht 5' 4\" (1.626 m)   Wt 97.1 kg (214 lb 1.1 oz)   SpO2 97%   BMI 36.74 kg/m²   General appearance: alert and hard of hearing  Head: Normocephalic, without obvious abnormality, atraumatic  Lungs:  no acute respiratory distress  Heart:  regular rate  Abdomen:  soft, non tender, non distended  Extremities:  mild edema, PROM and AROM in tact of b/l LEs, palpable tenderness in proximal calf b/l, no numbness or paresthesia, dry skin distally on feet, reduced erythema   Pulses:  difficult to palpate but auscultated on doppler  Skin:  ulcers with healthy bases and bleeding borders, new growth granulation tissue,  appear like that of venous ulcerations, reduced swelling and erythema            Right leg    Left Leg  Lab, Imaging and other studies:I have personally reviewed pertinent lab results.  , CBC:   Lab Results   Component Value Date    WBC 6.40 09/03/2024    HGB 11.2 (L) 09/03/2024    HCT 35.4 09/03/2024    MCV 85 09/03/2024     09/03/2024    RBC 4.18 " 09/03/2024    MCH 26.8 09/03/2024    MCHC 31.6 09/03/2024    RDW 20.3 (H) 09/03/2024    MPV 9.5 09/03/2024   , CMP:   Lab Results   Component Value Date    SODIUM 139 09/03/2024    K 3.8 09/03/2024     (H) 09/03/2024    CO2 23 09/03/2024    BUN 16 09/03/2024    CREATININE 0.46 (L) 09/03/2024    CALCIUM 7.6 (L) 09/03/2024    EGFR 96 09/03/2024     VTE Pharmacologic Prophylaxis: Enoxaparin (Lovenox)  VTE Mechanical Prophylaxis: reason for no mechanical VTE prophylaxis held due to discomfort

## 2024-09-03 NOTE — PROGRESS NOTES
[facility]  Progress Note  Name: Barbie Medel I  MRN: [id number]  Unit/Bed#: ICU 01 I Date of Admission: 8/28/2024   Date of Service: 9/4/2024 I Hospital Day: 6    Assessment & Plan   * Septic shock (HCC)  Assessment & Plan  SIRS on presentation 8/28 with tachycardia, tachypnea, and leukocytosis  Lactic acid negative on admission   Source: bilateral lower extremity leg wounds   Systolic blood pressures in the 80s -appropriately fluid resuscitated but continued to be hypotensive, and transferred to stepdown unit on 8/30 for hypotension  Wound cx 4+ staph aureas  BC x2 NG x5 days  Procal down trending   Trend temps and WBC   Echo 8/30: EF 60%  Cortisol 23, TSH 4.96, Free T4 1.03     Plan:   Cont levo for goal map >60  9/1 midodrine 5mg po tid added, increased to 10mg tid 9/3  Abx D8: Vanc 8  Surgery following for BL LE wounds -- no need for surgical intervention at this time    Metabolic acidosis-resolved as of 9/3/2024  Assessment & Plan  VBG 7.30/42.4/18.8/20.5/-5.6  Serum bicarb 16-->21   Vomiting and diarrhea at home  S/p IVF resuscitation   Question role of hyperchloremia    Plan:  Resolved on serial BMP    Multiple open wounds of lower leg  Assessment & Plan  POA-multiple open wounds on bilateral lower extremities with erythema and drainage  Continue Vanc as above   Wound care following  Surgery following -- no indication for surgical intervention at this time   8/28 Venous duplex negative  LAM unreliable study due to open wounds, severe pain, and patient position    Plan:  PRN Tylenol for pain control  Scheduled wound care     Fall  Assessment & Plan  Fall in her bathroom complaining of her legs giving out  Was on the floor for 1-1/2 hours before activating 911  8/28 CT head negative  8/28 CT cervical spine negative  8/28 CT chest/abdomen/pelvis - Minimally displaced anterior lateral right rib 10th fracture  PT/OT, OOBTC  Fall precautions      Onychomycosis  Assessment & Plan  Noted  in bilateral feet  Podiatry following    Ambulatory dysfunction  Assessment & Plan  Uses walker at baseline  PT/OT, fall precautions              Disposition: Critical care    ICU Core Measures     A: Assess, Prevent, and Manage Pain Has pain been assessed? Yes  Need for changes to pain regimen? No   B: Both SAT/SAT  N/A   C: Choice of Sedation RASS Goal: N/A patient not on sedation  Need for changes to sedation or analgesia regimen? NA   D: Delirium CAM-ICU: Negative   E: Early Mobility  Plan for early mobility? Yes   F: Family Engagement Plan for family engagement today? Yes       Antibiotic Review: Patient on appropriate coverage based on culture data.       Prophylaxis:  VTE VTE covered by:  enoxaparin, Subcutaneous, 40 mg at 09/03/24 0802       Stress Ulcer  not ordered         Significant 24hr Events     24hr events: Yesterday, midodrine was increased to 10mg TID. She continues on low-dose levophed. Barbie shared that she did not want to get out of bed yesterday, as her wound dressings were changed, which is painful for her. I did emphasize the importance of OOBTC and eventual PT/OT. She verbalized understanding and shares willingness today.      Subjective   Review of Systems: See HPI for Review of Systems     Objective                            Vitals I/O      Most Recent Min/Max in 24hrs   Temp 97.8 °F (36.6 °C) Temp  Min: 96.9 °F (36.1 °C)  Max: 97.8 °F (36.6 °C)   Pulse 65 Pulse  Min: 51  Max: 118   Resp (!) 23 Resp  Min: 3  Max: 35   /57 BP  Min: 69/47  Max: 124/67   O2 Sat 96 % SpO2  Min: 82 %  Max: 98 %      Intake/Output Summary (Last 24 hours) at 9/4/2024 0501  Last data filed at 9/4/2024 0400  Gross per 24 hour   Intake 618.85 ml   Output 435 ml   Net 183.85 ml       Diet Regular; Regular House    Invasive Monitoring           Physical Exam   Physical Exam  Vitals and nursing note reviewed.   Skin:     General: Skin is warm.      Capillary Refill: Capillary refill takes less than 2 seconds.       Comments: BL LE with intact dressings, BL foot erythema    HENT:      Head: Normocephalic and atraumatic.      Mouth/Throat:      Lips: Pink.      Mouth: Mucous membranes are moist.   Cardiovascular:      Rate and Rhythm: Normal rate and regular rhythm.   Abdominal: General: Abdomen is flat. Bowel sounds are normal.      Palpations: Abdomen is soft.      Tenderness: There is no abdominal tenderness.   Constitutional:       General: She is awake. She is not in acute distress.     Appearance: She is well-developed. She is obese. She is ill-appearing.   Pulmonary:      Effort: Pulmonary effort is normal.      Breath sounds: Examination of the right-lower field reveals decreased breath sounds. Examination of the left-lower field reveals decreased breath sounds. Decreased breath sounds present.   Psychiatric:         Mood and Affect: Mood normal.         Speech: Speech normal.         Behavior: Behavior is cooperative.   Neurological:      General: No focal deficit present.      Mental Status: She is alert and oriented to person, place, and time.   Genitourinary/Anorectal:     Comments: Voiding independently            Diagnostic Studies      EKG: NSR on tele   Imaging:   CT lower extremity w contrast right   Final Result      Subcutaneous edema and skin thickening bilaterally in the posterior thighs and extending from the mid calf through the feet, which can be seen with cellulitis. No fluid collections. No soft tissue gas.         Workstation performed: [id number]         CT lower extremity w contrast left   Final Result      Subcutaneous edema and skin thickening bilaterally in the posterior thighs and extending from the mid calf through the feet, which can be seen with cellulitis. No fluid collections. No soft tissue gas.         Workstation performed: [id number]         XR tibia fibula 2 vw left   Final Result      No acute osseous abnormality.      No radiographic findings of osteomyelitis.             Workstation performed: [id number]         XR tibia fibula 2 vw right   Final Result      No acute osseous abnormality.      No radiographic findings of osteomyelitis.            Workstation performed: [id number]         XR ankle 2 vw right   Final Result      No acute osseous abnormality.               Workstation performed: [id number]         XR ankle 2 vw left   Final Result      No acute osseous abnormality.      No radiographic findings of osteomyelitis.            Workstation performed: [id number]         XR foot 2 vw left   Final Result      No acute osseous abnormality.      No radiographic findings of osteomyelitis.         Workstation performed: [id number]         XR foot 2 vw right   Final Result      No acute osseous abnormality.      No radiographic findings of osteomyelitis.   .         Workstation performed: [id number]         VAS LAM & waveform analysis, multiple levels   Final Result       VAS VENOUS DUPLEX - LOWER LIMB BILATERAL   Final Result      CT head without contrast   Final Result      1.  No acute intracranial abnormality.   2.  Chronic microangiopathic changes.   3.  Significant sinus mucosal disease of the maxillary sinuses.                  Workstation performed: [id number]         CT cervical spine without contrast   Final Result      1.  No cervical spine fracture or traumatic malalignment.   2.  Severe multilevel degenerative change.   3.  Diminished osseous trabeculation most pronounced at the C6 vertebral body is likely due to low mineral density.               Workstation performed: [id number]         CT chest abdomen pelvis w contrast   Final Result   1.  Age-indeterminate minimally displaced anterolateral right 10th rib fracture. 2.2 cm region of hypoattenuation in the inferoposterior right hepatic lobe is favored to represent a prominent diaphragmatic slip (normal anatomy), whereas a laceration is    considered less likely despite the slightly more inferior age-indeterminate  anterolateral right 10th rib fracture.   2.  Interstitial reticulation in the right lower lobe is likely atelectasis given the elevation of the adjacent diaphragm.                  Workstation performed: [id number]            I have personally reviewed pertinent reports.       Medications:  Scheduled PRN   Albumin 25%, 12.5 g, Q8H  cyanocobalamin, 1,000 mcg, Daily  enoxaparin, 40 mg, Q24H VIRGIL  ergocalciferol, 50,000 Units, Weekly  midodrine, 10 mg, TID AC  nystatin, , BID  saccharomyces boulardii, 250 mg, BID  sodium hypochlorite, 1 Application, Daily  thiamine, 100 mg, Daily  vancomycin, 1,000 mg, Q12H      acetaminophen, 650 mg, Q6H PRN       Continuous    norepinephrine, 1-30 mcg/min, Last Rate: 3 mcg/min (09/04/24 0005)         Labs:    CBC    Recent Labs     09/03/24 0617 09/04/24  0437   WBC 6.40 8.52   HGB 11.2* 10.9*   HCT 35.4 35.7    304   BANDSPCT 1  --      BMP    Recent Labs     09/02/24  0527 09/03/24  0617   SODIUM 139 139   K 3.3* 3.8   * 112*   CO2 18* 23   AGAP 8 4   BUN 16 16   CREATININE 0.49* 0.46*   CALCIUM 6.7* 7.6*       Coags    No recent results     Additional Electrolytes  Recent Labs     09/02/24  0527 09/03/24  0617   MG 1.9 2.2   PHOS 2.8 2.5   CAIONIZED  --  1.01*          Blood Gas    No recent results  No recent results LFTs  No recent results    Infectious  Recent Labs     09/03/24  0617   PROCALCITONI 0.12     Glucose  Recent Labs     09/02/24  0527 09/03/24  0617   GLUC 95 101               LEEROY Love

## 2024-09-03 NOTE — OCCUPATIONAL THERAPY NOTE
Occupational Therapy Cancel Note     Patient Name: Barbie Medel  Today's Date: 9/3/2024  Problem List  Principal Problem:    Septic shock (HCC)  Active Problems:    Fall    Multiple open wounds of lower leg    Ambulatory dysfunction    Onychomycosis    Metabolic acidosis        09/03/24 1420   Note Type   Note Type Cancelled Session  (Tuesday 9/3/24)   Cancel Reasons Medical status  (per Bettye CASTANEDA pt is not appropriate to participate due to low BP; increased pressors.)   Activity Tolerance   Medical Staff Made Aware spoke w/ Bettye CASTANEDA and Ivy JUNIRO   Licensure   [place] License Number  Kimberlyn Hood, OTR/L [id number]     Kimbelryn Hood, OTR/L  [id number]  [id number]

## 2024-09-03 NOTE — PHYSICAL THERAPY NOTE
Physical Therapy Cancellation Note       09/03/24 1340   Note Type   Note Type Cancelled Session   Cancel Reasons Medical status  (Per discussion with LEONEL Wen pt with low BP 82/52 ; currently on medication to raise BP. To hold PT at this time and will follow-up as appropriate.)   Licensure   [place] License Number  Ivy Gee [id number]

## 2024-09-03 NOTE — PROGRESS NOTES
Barbie Medel is a 78 y.o. female who is currently ordered Vancomycin IV with management by the Pharmacy Consult service.  Relevant clinical data and objective / subjective history reviewed.  Vancomycin Assessment:  Indication and Goal AUC/Trough: Soft tissue (goal -600, trough >10), sepsis, -600, trough >10  Clinical Status: stable  Micro:     Renal Function:  SCr: 0.46 mg/dL  CrCl: 114.2 mL/min  Renal replacement: Not on dialysis  Days of Therapy: 6  Current Dose: 1250 mg IV q24h  Vancomycin Plan:  New Dosin mg IV q12h  Estimated AUC: 457 mcg*hr/mL  Estimated Trough: 13.4 mcg/mL  Next Level: 24 at 0600  Renal Function Monitoring: Daily BMP and UOP  Pharmacy will continue to follow closely for s/sx of nephrotoxicity, infusion reactions and appropriateness of therapy.  BMP and CBC will be ordered per protocol. We will continue to follow the patient’s culture results and clinical progress daily.    Saima Collnis, Pharmacist

## 2024-09-03 NOTE — ASSESSMENT & PLAN NOTE
VBG 7.30/42.4/18.8/20.5/-5.6  Serum bicarb 16-->21   Vomiting and diarrhea at home  S/p IVF resuscitation   Question role of hyperchloremia    Plan:  Resolved on serial BMP

## 2024-09-04 LAB
ANA SER QL IA: NEGATIVE
ANION GAP SERPL CALCULATED.3IONS-SCNC: 3 MMOL/L (ref 4–13)
BASOPHILS # BLD AUTO: 0.05 THOUSANDS/ÂΜL (ref 0–0.1)
BASOPHILS NFR BLD AUTO: 1 % (ref 0–1)
BUN SERPL-MCNC: 14 MG/DL (ref 5–25)
CALCIUM SERPL-MCNC: 8 MG/DL (ref 8.4–10.2)
CHLORIDE SERPL-SCNC: 111 MMOL/L (ref 96–108)
CO2 SERPL-SCNC: 24 MMOL/L (ref 21–32)
CREAT SERPL-MCNC: 0.45 MG/DL (ref 0.6–1.3)
CRP SERPL QL: 12.8 MG/L
EOSINOPHIL # BLD AUTO: 0.12 THOUSAND/ÂΜL (ref 0–0.61)
EOSINOPHIL NFR BLD AUTO: 1 % (ref 0–6)
ERYTHROCYTE [DISTWIDTH] IN BLOOD BY AUTOMATED COUNT: 20.6 % (ref 11.6–15.1)
ERYTHROCYTE [SEDIMENTATION RATE] IN BLOOD: 8 MM/HOUR (ref 0–29)
GFR SERPL CREATININE-BSD FRML MDRD: 96 ML/MIN/1.73SQ M
GLUCOSE SERPL-MCNC: 92 MG/DL (ref 65–140)
HCT VFR BLD AUTO: 35.7 % (ref 34.8–46.1)
HGB BLD-MCNC: 10.9 G/DL (ref 11.5–15.4)
IMM GRANULOCYTES # BLD AUTO: 0.18 THOUSAND/UL (ref 0–0.2)
IMM GRANULOCYTES NFR BLD AUTO: 2 % (ref 0–2)
LYMPHOCYTES # BLD AUTO: 1.93 THOUSANDS/ÂΜL (ref 0.6–4.47)
LYMPHOCYTES NFR BLD AUTO: 23 % (ref 14–44)
MAGNESIUM SERPL-MCNC: 2 MG/DL (ref 1.9–2.7)
MCH RBC QN AUTO: 26.5 PG (ref 26.8–34.3)
MCHC RBC AUTO-ENTMCNC: 30.5 G/DL (ref 31.4–37.4)
MCV RBC AUTO: 87 FL (ref 82–98)
MONOCYTES # BLD AUTO: 1.03 THOUSAND/ÂΜL (ref 0.17–1.22)
MONOCYTES NFR BLD AUTO: 12 % (ref 4–12)
NEUTROPHILS # BLD AUTO: 5.21 THOUSANDS/ÂΜL (ref 1.85–7.62)
NEUTS SEG NFR BLD AUTO: 61 % (ref 43–75)
NRBC BLD AUTO-RTO: 0 /100 WBCS
PHOSPHATE SERPL-MCNC: 2.2 MG/DL (ref 2.3–4.1)
PLATELET # BLD AUTO: 304 THOUSANDS/UL (ref 149–390)
PMV BLD AUTO: 9.5 FL (ref 8.9–12.7)
POTASSIUM SERPL-SCNC: 4 MMOL/L (ref 3.5–5.3)
RBC # BLD AUTO: 4.11 MILLION/UL (ref 3.81–5.12)
SODIUM SERPL-SCNC: 138 MMOL/L (ref 135–147)
WBC # BLD AUTO: 8.52 THOUSAND/UL (ref 4.31–10.16)

## 2024-09-04 PROCEDURE — 97535 SELF CARE MNGMENT TRAINING: CPT

## 2024-09-04 PROCEDURE — 85025 COMPLETE CBC W/AUTO DIFF WBC: CPT | Performed by: NURSE PRACTITIONER

## 2024-09-04 PROCEDURE — 97110 THERAPEUTIC EXERCISES: CPT

## 2024-09-04 PROCEDURE — 80048 BASIC METABOLIC PNL TOTAL CA: CPT | Performed by: NURSE PRACTITIONER

## 2024-09-04 PROCEDURE — 85652 RBC SED RATE AUTOMATED: CPT | Performed by: NURSE PRACTITIONER

## 2024-09-04 PROCEDURE — 86140 C-REACTIVE PROTEIN: CPT | Performed by: NURSE PRACTITIONER

## 2024-09-04 PROCEDURE — 99232 SBSQ HOSP IP/OBS MODERATE 35: CPT | Performed by: ANESTHESIOLOGY

## 2024-09-04 PROCEDURE — 83735 ASSAY OF MAGNESIUM: CPT | Performed by: NURSE PRACTITIONER

## 2024-09-04 PROCEDURE — 84100 ASSAY OF PHOSPHORUS: CPT | Performed by: NURSE PRACTITIONER

## 2024-09-04 PROCEDURE — 86038 ANTINUCLEAR ANTIBODIES: CPT | Performed by: NURSE PRACTITIONER

## 2024-09-04 RX ADMIN — VANCOMYCIN HYDROCHLORIDE 1000 MG: 1 INJECTION, SOLUTION INTRAVENOUS at 09:02

## 2024-09-04 RX ADMIN — VANCOMYCIN HYDROCHLORIDE 1000 MG: 1 INJECTION, SOLUTION INTRAVENOUS at 20:38

## 2024-09-04 RX ADMIN — Medication 250 MG: at 17:06

## 2024-09-04 RX ADMIN — ALBUMIN (HUMAN) 12.5 G: 0.25 INJECTION, SOLUTION INTRAVENOUS at 17:06

## 2024-09-04 RX ADMIN — MIDODRINE HYDROCHLORIDE 10 MG: 5 TABLET ORAL at 11:33

## 2024-09-04 RX ADMIN — Medication 250 MG: at 09:00

## 2024-09-04 RX ADMIN — HYOSCYAMINE SULFATE 1 APPLICATION: 16 SOLUTION at 09:01

## 2024-09-04 RX ADMIN — THIAMINE HCL TAB 100 MG 100 MG: 100 TAB at 09:00

## 2024-09-04 RX ADMIN — NYSTATIN 1 APPLICATION: 100000 POWDER TOPICAL at 17:07

## 2024-09-04 RX ADMIN — NYSTATIN 1 APPLICATION: 100000 POWDER TOPICAL at 09:01

## 2024-09-04 RX ADMIN — MIDODRINE HYDROCHLORIDE 10 MG: 5 TABLET ORAL at 06:13

## 2024-09-04 RX ADMIN — ALBUMIN (HUMAN) 12.5 G: 0.25 INJECTION, SOLUTION INTRAVENOUS at 01:52

## 2024-09-04 RX ADMIN — SODIUM PHOSPHATE, MONOBASIC, MONOHYDRATE AND SODIUM PHOSPHATE, DIBASIC, ANHYDROUS 30 MMOL: 142; 276 INJECTION, SOLUTION INTRAVENOUS at 09:02

## 2024-09-04 RX ADMIN — CYANOCOBALAMIN TAB 500 MCG 1000 MCG: 500 TAB at 09:00

## 2024-09-04 RX ADMIN — MIDODRINE HYDROCHLORIDE 10 MG: 5 TABLET ORAL at 16:12

## 2024-09-04 RX ADMIN — ALBUMIN (HUMAN) 12.5 G: 0.25 INJECTION, SOLUTION INTRAVENOUS at 08:56

## 2024-09-04 RX ADMIN — ENOXAPARIN SODIUM 40 MG: 40 INJECTION SUBCUTANEOUS at 08:59

## 2024-09-04 NOTE — QUICK NOTE
Reviewed CT scan of both right and left lower extremities.  Patient although has ulcerations that appear venous in nature and has some erythema which may resemble venous stasis dermatitis versus cellulitis does not appear to have any sort of deeper fluid collections.  Patient's hypotension and general fatigue/malaise is not suspected to be secondary to cellulitic and/or abscess component of the lower extremities.    No acute surgical intervention needed at this point in time.  Wounds appear healthy at their base based on pictures and wound care dressing changes yesterday.  To follow-up wound care orders per nursing and wound care team.    Discussed and coordinated with critical care team.  Possibly consider alternate routes compared to infectious route such as rheumatic based illnesses for fatigue/malaise/hypertension.

## 2024-09-04 NOTE — PLAN OF CARE
Problem: OCCUPATIONAL THERAPY ADULT  Goal: Performs self-care activities at highest level of function for planned discharge setting.  See evaluation for individualized goals.  Description: Treatment Interventions: ADL retraining, Functional transfer training, UE strengthening/ROM, Endurance training, Patient/family training, Equipment evaluation/education, Compensatory technique education, Continued evaluation, Energy conservation, Activityengagement          See flowsheet documentation for full assessment, interventions and recommendations.   9/4/2024 1345 by Kimberlyn Hood OT  Outcome: Progressing  Note: Limitation: Decreased ADL status, Decreased UE strength, Decreased endurance, Decreased self-care trans, Decreased high-level ADLs (impaired pain, activity tolerance, standing tolerance, balance, LE edema, forward functional reach, insight into deficits)     Assessment: Pt seen for skilled OT tx session day 2 focusing on activity engagement, challenging activity / standing tolerance, ongoing eval. Pt agreeable to participate. Pt continues to require max A x1 to stand. Pt required Ax2 using RW for short distance functional mobility using RW. Pt engaged in grooming and UBD while seated OOB in chair w/ min A and + time. Continue to recommend level II rehab resourece intensity when medically stable for discharge from acute care. Will continue to follow     Rehab Resource Intensity Level, OT: II (Moderate Resource Intensity)       9/4/2024 1345 by Kimberlyn Hood OT  Outcome: Progressing  Note: Limitation: Decreased ADL status, Decreased UE strength, Decreased endurance, Decreased self-care trans, Decreased high-level ADLs (impaired pain, activity tolerance, standing tolerance, balance, LE edema, forward functional reach, insight into deficits)     Assessment: Pt seen for skilled OT tx session day 2 focusing on activity engagement, challenging activity / standing tolerance, ongoing eval. Pt agreeable to participate.  Pt continues to require max A x1 to stand. Pt required Ax2 using RW for short distance functional mobility using RW. Pt engaged in grooming and UBD while seated OOB in chair w/ min A and + time. Continue to recommend level II rehab resourece intensity when medically stable for discharge from acute care. Will continue to follow     Rehab Resource Intensity Level, OT: II (Moderate Resource Intensity)

## 2024-09-04 NOTE — OCCUPATIONAL THERAPY NOTE
Occupational Therapy Progress Note     Patient Name: Barbie Medel  Today's Date: 9/4/2024  Problem List  Principal Problem:    Septic shock (HCC)  Active Problems:    Fall    Multiple open wounds of lower leg    Ambulatory dysfunction    Onychomycosis       09/04/24 1231   OT Last Visit   OT Visit Date 09/04/24  (Wednesday)   Note Type   Note Type Treatment  (tx [id number])   Pain Assessment   Pain Assessment Tool 0-10   Pain Score 8   Pain Location/Orientation Orientation: Bilateral;Location: Leg   Effect of Pain on Daily Activities limits I w/ sit <> stand and activity tolerance during ADLs   Patient's Stated Pain Goal No pain   Hospital Pain Intervention(s) Repositioned;Ambulation/increased activity;Elevated;Emotional support   Restrictions/Precautions   Weight Bearing Precautions Per Order No   Other Precautions Contact/isolation;Chair Alarm;Bed Alarm;Multiple lines;Telemetry;Fall Risk;Pain;Hard of hearing   Lifestyle   Autonomy Pt reports I w/ basic ADLs using cane and furniture / walls   Reciprocal Relationships Pt reports living alone   Service to Others 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 Assist to reach knots / tangles back of head   UB Dressing Assistance 4  Minimal Assistance   UB Dressing Deficit Pull around back;Fasteners;Increased time to complete;Supervision/safety;Verbal cueing;Setup   LB Dressing Assistance Unable to assess   Toileting Assistance    (denied need to void)   Bed  "Mobility   Supine to Sit Unable to assess   Sit to Supine 2  Maximal assistance   Additional items Assist x 2;Increased time required;Verbal cues;LE management;Bedrails   Additional Comments Pt seated OOB in bedsdie recliner chair upon arriva. Requested to return to bed following tx session. BP 88/58 upon return to chair following sit to stand. Pt reports dizziness upon stand. /60 following seated rest break X 5 minutes w/ LE elevated   Transfers   Sit to Stand 2  Maximal assistance   Additional items Assist x 1;Bedrails;Armrests;Increased time required;Verbal cues   Stand to Sit 2  Maximal assistance   Additional items Assist x 1;Bedrails;Armrests;Increased time required;Verbal cues   Additional Comments Pt performed sit <> stand 2X w/ max A x1. Pt supine HOB elevated at end of session w/ needs met, call vasquez in reach and PTAbby present   Functional Mobility   Functional Mobility (S)  3  Moderate assistance  (mod A x2 using RW few feet chair to EOB)   Additional items Rolling walker   Therapeutic Exercise - ROM   UE-ROM Yes   ROM- Right Upper Extremities   R Shoulder AROM;Flexion;External rotation   RUE ROM Comment 10 reps   ROM - Left Upper Extremities    L Shoulder AAROM;Flexion;External rotation   LUE ROM Comment 10 reps   Subjective   Subjective \"Luis is very bad. I named it Luis Fraser from [employer]\" (referring to BP cuff/ tele)   Cognition   Overall Cognitive Status WFL  (alert, generally oriented and able to follow directions, participate in conversation w/ + time due to hard of hearing)   Arousal/Participation Alert;Cooperative   Attention Attends with cues to redirect   Orientation Level Oriented to person;Oriented to place;Oriented to situation   Memory Decreased recall of recent events  (details, timeline events)   Following Commands Follows multistep commands with increased time or repetition  (hard of hearing)   Comments Identified pt by full name and birthdate. Alert, agreeable to " participate. Able to follow directions, communicate wants/ needs w/ + time.   Activity Tolerance   Activity Tolerance Patient limited by fatigue;Patient limited by pain   Medical Staff Made Aware spoke w/ RNChristal pre / post tx session. Care coordination w/ PTAbby for portion of session due to decreased activity tolerance and skilled physical assistance required   Assessment   Assessment Pt seen for skilled OT tx session day 2 focusing on activity engagement, challenging activity / standing tolerance, ongoing eval. Pt agreeable to participate. Pt continues to require max A x1 to stand. Pt required Ax2 using RW for short distance functional mobility using RW. Pt engaged in grooming and UBD while seated OOB in chair w/ min A and + time. Continue to recommend level II rehab resourece intensity when medically stable for discharge from acute care. Will continue to follow   Plan   Treatment Interventions ADL retraining;Functional transfer training;Endurance training;Patient/family training;Equipment evaluation/education;Compensatory technique education;Continued evaluation;Energy conservation;Activityengagement   Goal Expiration Date 09/12/24   OT Treatment Day 2  (Wed 9/4/24)   OT Frequency 3-5x/wk   Discharge Recommendation   Rehab Resource Intensity Level, OT II (Moderate Resource Intensity)   AM-PAC Daily Activity Inpatient   Lower Body Dressing 2   Bathing 2   Toileting 2   Upper Body Dressing 3   Grooming 3   Eating 4   Daily Activity Raw Score 16   Daily Activity Standardized Score (Calc for Raw Score >=11) 35.96   AM-PAC Applied Cognition Inpatient   Following a Speech/Presentation 3   Understanding Ordinary Conversation 3   Taking Medications 2   Remembering Where Things Are Placed or Put Away 3   Remembering List of 4-5 Errands 3   Taking Care of Complicated Tasks 3   Applied Cognition Raw Score 17   Applied Cognition Standardized Score 36.52   Barthel Index   Feeding 10   Bathing 0   Grooming Score 0    Dressing Score 5   Bladder Score 5   Bowels Score 5   Toilet Use Score 5   Transfers (Bed/Chair) Score 5   Mobility (Level Surface) Score 0   Stairs Score 0   Barthel Index Score 35   End of Consult   Education Provided Yes   Patient Position at End of Consult Supine;Bed/Chair alarm activated;All needs within reach   Nurse Communication Nurse aware of consult   Licensure   [place] License Number  Kimberlyn Guerrierjacoby, OTR/L [id number]          The patient's raw score on the -PAC Daily Activity Inpatient Short Form is 16. A raw score of less than 19 suggests the patient may benefit from discharge to post-acute rehabilitation services. Please refer to the recommendation of the Occupational Therapist for safe discharge planning.    Kimberlyn Hood, OTR/L  [id number]  [id number]

## 2024-09-04 NOTE — WOUND OSTOMY CARE
Progress Note - Wound   Barbie Medel 78 y.o. female MRN: [id number]  Unit/Bed#: ICU 01 Encounter: [id number]    Assessment:   This is a follow up visit for this 78 year old female patient admitted on 8/28/24 with sepsis. Patient was received in ICU sitting in reclining chair. She was AAO x 3 and agreeable to have wound visit done. She is continent of urine and stool.    Assessment Findings:  1-Full thickness wounds to bilateral lower legs most likely due to venous stasis. Patient stated that she normally sleeps in chair at home with legs dependent but she has been sleeping in ICU bed at night. Wound care orders changed due to increased drainage.  2-Sacrobuttocks intact with blanchable erythema as per Primary RN - orders in place for prevention.  3-Bilateral heels intact - orders in place for skin care and for prevention.     Plan:  Skin care plans:  1-Cleanse wounds to b/l lower legs with NSS & pat dry. Apply Prevent barrier cream to periwound areas for protection. Apply Dakin's soaked gauze to wounds x 5 minutes, rinse with NSS & pat dry. Apply Adaptic then Melgisorb Ag (calcium alginate with silver) to wounds cut to size, cover with ABD pads, kerlix and tape. Change daily & prn soilage/dislodgement.  2-Apply light dusting of Nystatin powder to intergluteal cleft 2x/day as directed. Gently remove excess to prevent caking.  3-Apply Prevent barrier cream to bilateral sacrobuttocks (not in-between folds) and heels BID and PRN  4-Float heels on 2 pillows to offload pressure so heels are not in contact with mattress or pillows.  5-Ehob pressure redistribution cushion in chair when out of bed. Please continue to educate patient regarding importance of not sleeping in reclining chair at night and to avoid prolonged sitting during the day.  6-Moisturize skin daily with skin nourishing cream.  7-Turn/reposition q2h or when medically stable for pressure re-distribution on skin.   8-Encourage/assist patient to elevate  legs when sitting/lying in bed as much as possible.    Wound 08/28/24 Leg Right;Lower (Active)   Wound Image  08/28/24 1700   Wound Description Granulation tissue;Beefy red 09/04/24 1200   Gwendolyn-wound Assessment Edema;Erythema 09/04/24 1200   Wound Length (cm) 18 cm 09/04/24 1102   Wound Width (cm) 35 cm 09/04/24 1102   Wound Depth (cm) 0.1 cm 09/04/24 1102   Wound Surface Area (cm^2) 437.5 cm^2 09/04/24 1102   Wound Volume (cm^3) 43.75 cm^3 09/04/24 1102   Calculated Wound Volume (cm^3) 43.75 cm^3 09/04/24 1102   Change in Wound Size % 19.87 09/04/24 1102   Drainage Amount Large 09/04/24 1200   Drainage Description Sanguineous 09/04/24 1200   Non-staged Wound Description Full thickness 09/04/24 1200   Treatments Cleansed 09/04/24 1200   Dressing Calcium Alginate with Silver;ABD;Adaptic;Dry dressing 09/04/24 1200   Dressing Changed New 09/04/24 1200   Dressing Status Clean;Dry;Intact 09/04/24 1200       Wound 08/28/24 Leg Left;Lateral;Lower (Active)   Wound Image   09/04/24 1049   Wound Description Granulation tissue;Beefy red 09/04/24 1049   Gwendolyn-wound Assessment Edema;Erythema 09/04/24 1049   Wound Length (cm) 12 cm 09/04/24 1049   Wound Width (cm) 3.5 cm 09/04/24 1049   Wound Depth (cm) 0.1 cm 09/04/24 1049   Wound Surface Area (cm^2) 28 cm^2 09/04/24 1049   Wound Volume (cm^3) 2.8 cm^3 09/04/24 1049   Calculated Wound Volume (cm^3) 2.8 cm^3 09/04/24 1049   Change in Wound Size % 83.43 09/04/24 1049   Drainage Amount Large 09/04/24 1049   Drainage Description Sanguineous 09/04/24 1049   Non-staged Wound Description Full thickness 09/04/24 1100   Treatments Cleansed 09/04/24 1100   Dressing Calcium Alginate with Silver;ABD;Adaptic;Dry dressing 09/04/24 1100   Dressing Changed New 09/04/24 1100   Dressing Status Clean;Dry;Intact 09/04/24 1100         Wound 09/04/24 Venous Ulcer Leg Left;Medial (Active)   Wound Image   09/04/24 1059   Wound Description Granulation tissue;Pink;Slough;Yellow 09/04/24 1059   Gwendolyn-wound  Assessment Dry;Edema;Erythema 09/04/24 1059   Wound Length (cm) 0.7 cm 09/04/24 1059   Wound Width (cm) 1 cm 09/04/24 1059   Wound Depth (cm) 0.1 cm 09/04/24 1059   Wound Surface Area (cm^2) 0.7 cm^2 09/04/24 1059   Wound Volume (cm^3) 0.07 cm^3 09/04/24 1059   Calculated Wound Volume (cm^3) 0.07 cm^3 09/04/24 1059   Drainage Amount Moderate 09/04/24 1059   Drainage Description Sanguineous 09/04/24 1059   Non-staged Wound Description Full thickness 09/04/24 1059   Treatments Cleansed 09/04/24 1059   Dressing Adaptic;Calcium Alginate with Silver;ABD;Dry dressing 09/04/24 1059   Dressing Changed New 09/04/24 1059   Dressing Status Clean;Dry;Intact 09/04/24 1059     Discussed assessment findings, and plan of care/recommendations with Christal CASTANEDA.    Wound care will follow along with patient throughout admission, please call or text with questions and concerns.    Recommendations written as orders.  Johanna Cartagena MSN, RN, CWON

## 2024-09-04 NOTE — PHYSICAL THERAPY NOTE
PT TREATMENT     09/04/24 1410   PT Last Visit   PT Visit Date 09/04/24   Note Type   Note Type Treatment   Pain Assessment   Pain Assessment Tool 0-10   Pain Score 8  (Bilateral lower extremities to touch)   Restrictions/Precautions   Other Precautions Contact/isolation;Chair Alarm;Bed Alarm;Multiple lines;Fall Risk;Pain  (Seen in ICU)   General   Chart Reviewed Yes   Family/Caregiver Present No   Cognition   Arousal/Participation Cooperative   Subjective   Subjective Patient states fatigue and bilateral lower extremity pain due to dressing change   Bed Mobility   Sit to Supine 2  Maximal assistance   Additional items Assist x 2;Verbal cues;LE management   Transfers   Sit to Stand 2  Maximal assistance   Additional items Assist x 1   Stand to Sit 2  Maximal assistance   Additional items Assist x 1   Ambulation/Elevation   Gait Assistance 3  Moderate assist   Additional items Assist x 2;Verbal cues;Tactile cues   Assistive Device Rolling walker   Distance 5 feet with narrow base of support and forward flexed posturing.  Patient required weight shifting to advance lower extremities at times   Balance   Static Sitting Fair   Dynamic Sitting Fair -   Static Standing Poor +   Dynamic Standing Poor   Ambulatory Poor +   Activity Tolerance   Activity Tolerance Patient limited by fatigue;Patient limited by pain;Treatment limited secondary to medical complications (Comment)   Nurse Made Aware Yes   Exercises   Quad Sets Supine;10 reps;Bilateral   Heelslides Supine;10 reps;Bilateral   Hip Abduction Supine;10 reps;Bilateral   Knee AROM Short Arc Quad Supine;5 reps;Bilateral   Ankle Pumps Supine;10 reps;Bilateral   Marching Supine;5 reps;AAROM;Bilateral   Assessment   Assessment Patient motivated and cooperative.  Requiring assist of 2 for safety with transfers and gait activity and will benefit from continued physical therapy with progression as tolerated and increasing functional mobility with clinical staff as well. The  patient's -[facility] Basic Mobility Inpatient Short Form Raw Score is 9. A Raw score of less than or equal to 16 suggests the patient may benefit from discharge to post-acute rehabilitation services. Please also refer to the recommendation of the Physical Therapist for safe discharge planning.           Plan   Treatment/Interventions ADL retraining;Functional transfer training;LE strengthening/ROM;Therapeutic exercise;Endurance training;Patient/family training;Equipment eval/education;Bed mobility;Gait training;Compensatory technique education   PT Frequency Other (Comment)  (5 times per week)   Discharge Recommendation   Rehab Resource Intensity Level, PT II (Moderate Resource Intensity)   AM-PAC Basic Mobility Inpatient   Turning in Flat Bed Without Bedrails 2   Lying on Back to Sitting on Edge of Flat Bed Without Bedrails 2   Moving Bed to Chair 1   Standing Up From Chair Using Arms 2   Walk in Room 1   Climb 3-5 Stairs With Railing 1   Basic Mobility Inpatient Raw Score 9   [facility] Highest Level Of Mobility   JH-HLM Goal 3: Sit at edge of bed   JH-HLM Achieved 4: Move to chair/commode   Education   Patient Demonstrates acceptance/verbal understanding;Explanation/teachback used;Demonstrates verbal understanding   Licensure   [place] License Number  Abby Garcia, PT 4 0 [id number]

## 2024-09-04 NOTE — PLAN OF CARE
Problem: OCCUPATIONAL THERAPY ADULT  Goal: Performs self-care activities at highest level of function for planned discharge setting.  See evaluation for individualized goals.  Description: Treatment Interventions: ADL retraining, Functional transfer training, UE strengthening/ROM, Endurance training, Patient/family training, Equipment evaluation/education, Compensatory technique education, Continued evaluation, Energy conservation, Activityengagement          See flowsheet documentation for full assessment, interventions and recommendations.   Outcome: Progressing  Note: Limitation: Decreased ADL status, Decreased UE strength, Decreased endurance, Decreased self-care trans, Decreased high-level ADLs (impaired pain, activity tolerance, standing tolerance, balance, LE edema, forward functional reach, insight into deficits)     Assessment: Pt seen for skilled OT tx session day 2 focusing on activity engagement, challenging activity / standing tolerance, ongoing eval. Pt agreeable to participate. Pt continues to require max A x1 to stand. Pt required Ax2 using RW for short distance functional mobility using RW. Pt engaged in grooming and UBD while seated OOB in chair w/ min A and + time. Continue to recommend level II rehab resourece intensity when medically stable for discharge from acute care. Will continue to follow     Rehab Resource Intensity Level, OT: II (Moderate Resource Intensity)

## 2024-09-04 NOTE — PROGRESS NOTES
Barbie Medel is a 78 y.o. female who is currently ordered Vancomycin IV with management by the Pharmacy Consult service.  Relevant clinical data and objective / subjective history reviewed.  Vancomycin Assessment:  Indication and Goal AUC/Trough: Soft tissue (goal -600, trough >10), sepsis, -600, trough >10  Clinical Status: stable  Micro:     Renal Function:  SCr: 0.45 mg/dL  CrCl: 118.5 mL/min  Renal replacement: Not on dialysis  Days of Therapy: 7  Current Dose: 1250 mg IV q24h  Vancomycin Plan:  New Dosin mg IV q12h  Estimated AUC: 445 mcg*hr/mL  Estimated Trough: 13 mcg/mL  Next Level: 24 at 0600  Renal Function Monitoring: Daily BMP and UOP  Pharmacy will continue to follow closely for s/sx of nephrotoxicity, infusion reactions and appropriateness of therapy.  BMP and CBC will be ordered per protocol. We will continue to follow the patient’s culture results and clinical progress daily.    Saima Collins, Pharmacist

## 2024-09-05 LAB
ANION GAP SERPL CALCULATED.3IONS-SCNC: 4 MMOL/L (ref 4–13)
BASOPHILS # BLD AUTO: 0.04 THOUSANDS/ÂΜL (ref 0–0.1)
BASOPHILS NFR BLD AUTO: 1 % (ref 0–1)
BUN SERPL-MCNC: 13 MG/DL (ref 5–25)
CALCIUM SERPL-MCNC: 7.6 MG/DL (ref 8.4–10.2)
CHLORIDE SERPL-SCNC: 109 MMOL/L (ref 96–108)
CO2 SERPL-SCNC: 25 MMOL/L (ref 21–32)
CREAT SERPL-MCNC: 0.41 MG/DL (ref 0.6–1.3)
EOSINOPHIL # BLD AUTO: 0.08 THOUSAND/ÂΜL (ref 0–0.61)
EOSINOPHIL NFR BLD AUTO: 1 % (ref 0–6)
ERYTHROCYTE [DISTWIDTH] IN BLOOD BY AUTOMATED COUNT: 20.5 % (ref 11.6–15.1)
GFR SERPL CREATININE-BSD FRML MDRD: 99 ML/MIN/1.73SQ M
GLUCOSE SERPL-MCNC: 85 MG/DL (ref 65–140)
HCT VFR BLD AUTO: 32.2 % (ref 34.8–46.1)
HGB BLD-MCNC: 9.9 G/DL (ref 11.5–15.4)
IMM GRANULOCYTES # BLD AUTO: 0.11 THOUSAND/UL (ref 0–0.2)
IMM GRANULOCYTES NFR BLD AUTO: 2 % (ref 0–2)
LYMPHOCYTES # BLD AUTO: 1.14 THOUSANDS/ÂΜL (ref 0.6–4.47)
LYMPHOCYTES NFR BLD AUTO: 17 % (ref 14–44)
MAGNESIUM SERPL-MCNC: 1.8 MG/DL (ref 1.9–2.7)
MCH RBC QN AUTO: 26.8 PG (ref 26.8–34.3)
MCHC RBC AUTO-ENTMCNC: 30.7 G/DL (ref 31.4–37.4)
MCV RBC AUTO: 87 FL (ref 82–98)
MONOCYTES # BLD AUTO: 0.61 THOUSAND/ÂΜL (ref 0.17–1.22)
MONOCYTES NFR BLD AUTO: 9 % (ref 4–12)
NEUTROPHILS # BLD AUTO: 4.76 THOUSANDS/ÂΜL (ref 1.85–7.62)
NEUTS SEG NFR BLD AUTO: 70 % (ref 43–75)
NRBC BLD AUTO-RTO: 0 /100 WBCS
PHOSPHATE SERPL-MCNC: 3.2 MG/DL (ref 2.3–4.1)
PLATELET # BLD AUTO: 267 THOUSANDS/UL (ref 149–390)
PMV BLD AUTO: 9.5 FL (ref 8.9–12.7)
POTASSIUM SERPL-SCNC: 3.8 MMOL/L (ref 3.5–5.3)
RBC # BLD AUTO: 3.7 MILLION/UL (ref 3.81–5.12)
SODIUM SERPL-SCNC: 138 MMOL/L (ref 135–147)
WBC # BLD AUTO: 6.74 THOUSAND/UL (ref 4.31–10.16)

## 2024-09-05 PROCEDURE — 80048 BASIC METABOLIC PNL TOTAL CA: CPT | Performed by: NURSE PRACTITIONER

## 2024-09-05 PROCEDURE — 84100 ASSAY OF PHOSPHORUS: CPT | Performed by: NURSE PRACTITIONER

## 2024-09-05 PROCEDURE — 85025 COMPLETE CBC W/AUTO DIFF WBC: CPT | Performed by: NURSE PRACTITIONER

## 2024-09-05 PROCEDURE — 99232 SBSQ HOSP IP/OBS MODERATE 35: CPT | Performed by: ANESTHESIOLOGY

## 2024-09-05 PROCEDURE — 83735 ASSAY OF MAGNESIUM: CPT | Performed by: NURSE PRACTITIONER

## 2024-09-05 RX ORDER — MAGNESIUM SULFATE HEPTAHYDRATE 40 MG/ML
2 INJECTION, SOLUTION INTRAVENOUS ONCE
Status: COMPLETED | OUTPATIENT
Start: 2024-09-05 | End: 2024-09-06

## 2024-09-05 RX ORDER — CALCIUM GLUCONATE 20 MG/ML
2 INJECTION, SOLUTION INTRAVENOUS ONCE
Status: COMPLETED | OUTPATIENT
Start: 2024-09-05 | End: 2024-09-06

## 2024-09-05 RX ORDER — POTASSIUM CHLORIDE 1500 MG/1
20 TABLET, EXTENDED RELEASE ORAL ONCE
Status: COMPLETED | OUTPATIENT
Start: 2024-09-05 | End: 2024-09-05

## 2024-09-05 RX ORDER — FLUDROCORTISONE ACETATE 0.1 MG/1
0.1 TABLET ORAL DAILY
Status: DISCONTINUED | OUTPATIENT
Start: 2024-09-05 | End: 2024-09-07 | Stop reason: HOSPADM

## 2024-09-05 RX ADMIN — VANCOMYCIN HYDROCHLORIDE 1000 MG: 1 INJECTION, SOLUTION INTRAVENOUS at 22:29

## 2024-09-05 RX ADMIN — FLUDROCORTISONE ACETATE 0.1 MG: 0.1 TABLET ORAL at 12:46

## 2024-09-05 RX ADMIN — ERGOCALCIFEROL 50000 UNITS: 1.25 CAPSULE ORAL at 08:05

## 2024-09-05 RX ADMIN — VANCOMYCIN HYDROCHLORIDE 1000 MG: 1 INJECTION, SOLUTION INTRAVENOUS at 09:16

## 2024-09-05 RX ADMIN — THIAMINE HCL TAB 100 MG 100 MG: 100 TAB at 08:02

## 2024-09-05 RX ADMIN — MAGNESIUM SULFATE HEPTAHYDRATE 2 G: 40 INJECTION, SOLUTION INTRAVENOUS at 07:55

## 2024-09-05 RX ADMIN — Medication 250 MG: at 17:26

## 2024-09-05 RX ADMIN — NYSTATIN 1 APPLICATION: 100000 POWDER TOPICAL at 08:03

## 2024-09-05 RX ADMIN — MIDODRINE HYDROCHLORIDE 10 MG: 5 TABLET ORAL at 16:07

## 2024-09-05 RX ADMIN — HYOSCYAMINE SULFATE 1 APPLICATION: 16 SOLUTION at 08:04

## 2024-09-05 RX ADMIN — ENOXAPARIN SODIUM 40 MG: 40 INJECTION SUBCUTANEOUS at 08:03

## 2024-09-05 RX ADMIN — MIDODRINE HYDROCHLORIDE 10 MG: 5 TABLET ORAL at 05:56

## 2024-09-05 RX ADMIN — ALBUMIN (HUMAN) 12.5 G: 0.25 INJECTION, SOLUTION INTRAVENOUS at 01:39

## 2024-09-05 RX ADMIN — ACETAMINOPHEN 650 MG: 325 TABLET ORAL at 14:07

## 2024-09-05 RX ADMIN — MIDODRINE HYDROCHLORIDE 10 MG: 5 TABLET ORAL at 11:22

## 2024-09-05 RX ADMIN — POTASSIUM CHLORIDE 20 MEQ: 1500 TABLET, EXTENDED RELEASE ORAL at 07:55

## 2024-09-05 RX ADMIN — CALCIUM GLUCONATE 2 G: 20 INJECTION, SOLUTION INTRAVENOUS at 07:55

## 2024-09-05 RX ADMIN — Medication 250 MG: at 08:02

## 2024-09-05 RX ADMIN — NYSTATIN 1 APPLICATION: 100000 POWDER TOPICAL at 17:26

## 2024-09-05 RX ADMIN — CYANOCOBALAMIN TAB 500 MCG 1000 MCG: 500 TAB at 08:02

## 2024-09-05 NOTE — PROGRESS NOTES
Patient:    MRN:  [id number]    Radha Request ID:  [id number]    Level of care reserved:  Skilled Nursing Facility    Partner Reserved:  [facility], [place] [address] [phone]    Clinical needs requested:    Geography searched:  30 miles around [id number]    Start of Service:    Request sent:  4:05pm EDT on 8/31/2024 by Fiorella Coley    Partner reserved:  2:55pm EDT on 9/5/2024 by Laura Neely    Choice list shared:  11:55am EDT on 9/5/2024 by Laura Neely

## 2024-09-05 NOTE — CASE MANAGEMENT
Case Management Discharge Planning Note    Patient name Barbie Medel  Location ICU 01/ICU 01 MRN [id number]  : 1945 Date 2024       Current Admission Date: 2024  Current Admission Diagnosis:Septic shock (HCC)   Patient Active Problem List    Diagnosis Date Noted Date Diagnosed    Fall 2024     Septic shock (HCC) 2024     Multiple open wounds of lower leg 2024     Ambulatory dysfunction 2024     Onychomycosis 2024       LOS (days): 7  Geometric Mean LOS (GMLOS) (days): 3.6  Days to GMLOS:-3.4     OBJECTIVE:  Risk of Unplanned Readmission Score: 13.63     Current admission status: Inpatient   Preferred Pharmacy:   [facility] - [place], [place] - [address]  [address]  [facility] [address]  Phone: [phone] Fax: [phone]    Primary Care Provider: No primary care provider on file.    Primary Insurance: MEDICARE  Secondary Insurance:     DISCHARGE DETAILS:    Discharge planning discussed with:: Patient  Freedom of Choice: Yes  Comments - Freedom of Choice: SW following to assist with planning.  STR placement is planned.  SW met with pt to review accepting facilities once again.  After reopening [facility] referral pt was accepted by [facility].  Pt is requesting transfer to [facility] upon discharge.  Facility will be reserved.  SW will follow to monitor progress and assist with transfer when ready.    Other Referral/Resources/Interventions Provided:  Interventions: Short Term Rehab    Treatment Team Recommendation: Short Term Rehab  Discharge Destination Plan:: Short Term Rehab  Transport at Discharge : Wheelchair van

## 2024-09-05 NOTE — CASE MANAGEMENT
Case Management Discharge Planning Note    Patient name Barbie Medel  Location ICU 01/ICU 01 MRN [id number]  : 1945 Date 2024       Current Admission Date: 2024  Current Admission Diagnosis:Septic shock (HCC)   Patient Active Problem List    Diagnosis Date Noted Date Diagnosed    Fall 2024     Septic shock (HCC) 2024     Multiple open wounds of lower leg 2024     Ambulatory dysfunction 2024     Onychomycosis 2024       LOS (days): 7  Geometric Mean LOS (GMLOS) (days): 3.6  Days to GMLOS:-3.2     OBJECTIVE:  Risk of Unplanned Readmission Score: 12.55     Current admission status: Inpatient   Preferred Pharmacy:   [facility] - [place], [place] - [address]  [address]  [facility] [address]  Phone: [phone] Fax: [phone]    Primary Care Provider: No primary care provider on file.    Primary Insurance: MEDICARE  Secondary Insurance:     DISCHARGE DETAILS:    Discharge planning discussed with:: Patient  Freedom of Choice: Yes  Comments - Freedom of Choice: SW following to assist with planning.  SW met with pt to review plans.  Per pt she is planning to transfer to a skilled nursing facility for rehab and then transition to long term care.  Pt said there is no way she can live alone anymore.  SW discussed facility options with pt.  Pt's preferrence is to stay local and her facility preference is [facility].  Pt said if that facility is not available she will consider one of the two facilities on [facility].  Pt also requested that facilities stop bothering her friend regarding discharge decision and facility choice.  Pt said she is able to make her own decisions.  SW will notify all facilities of pt's request. [facility] had not responded to initial referral so Aidin referral will be re-opened to give admissions time to review and confirm admission decision.  [facility] request was submitted since long term care is  planned.  SW will continue to follow to monitor progress and assist with planning.    Requested Home Health Care         Is the patient interested in HHC at discharge?: No    DME Referral Provided  Referral made for DME?: No    Other Referral/Resources/Interventions Provided:  Interventions: Short Term Rehab    Treatment Team Recommendation: Short Term Rehab  Discharge Destination Plan:: Short Term Rehab  Transport at Discharge : Wheelchair van

## 2024-09-05 NOTE — QUICK NOTE
"Critical Care Interval Transfer Note:    Brief Hospital Summary:   78 y.o. female with with no significant PMH who presents with fall at home.  Patient reports that she was ambulating into the bathroom when her \"legs just gave out\" and she fell to the floor.  She denied any head strike or loss of consciousness.  Her phone was unfortunately out of reach and it took her about an hour and a half to eventually knock it off the shelf and she was able to call 911. She was brought to the emergency department for further evaluation and treatment. In the ED patient was noted to have tachycardia 108, tachypnea 23, WBC 15.61. Troponin negative, lactic acid 2.0 and total . She had a CT scan of the head which was negative for intracranial abnormality, chronic microangiopathic changes, significant sinus mucosal disease of maxillary sinuses as per radiology report. CT cervical spine showed no fracture or malalignment, severe multilevel degenerative changes as per radiology report. CT chest abdomen pelvis demonstrated age-indeterminate minimally displaced anterior lateral right 10th rib fracture, a 2.2 cm region of hypoattenuation in the inferoposterior right hepatic lobe favored to represent prominent diaphragmatic slip (normal anatomy) whereas laceration is less likely despite the age-indeterminate rib fracture as per radiology report. The patient was noted to have bilateral lower extremity edema with significant uricemia and multiple wounds including areas of eschar noted.  She was started on IV Ancef and vancomycin.    Patient with wound culture 4+ staph aureas (MSSA) but treated with vanco D9/10.     Patient was transferred to critical care on 8/30 for development of septic shock requiring Levo. Patient was able to wean off Levo and started on midodrine 10mg TID. Cortisol and TSH WNL. Orthostatic BPs negative. Started on Florinef 0.1mg today. MAP goal is 60 given suspected chronicity to hypotension and no signs of end " organ dysfunction.    Barriers to discharge:   Monitor BP, complete course of vanco      Consults: IP CONSULT TO PODIATRY  IP CONSULT TO ACUTE CARE SURGERY  IP CONSULT TO PHARMACY    Recommended to review admission imaging for incidental findings and document in discharge navigator: Chart reviewed, no known incidental findings noted at this time.      Discharge Plan: Anticipate discharge in 48-72 hrs to rehab facility. Referral made to [facility].     Patient seen and evaluated by Critical Care today and deemed to be appropriate for transfer to Med Surg. Spoke to Dr. Grant from [facility] to accept transfer. Critical care can be contacted via [employer] with any questions or concerns.

## 2024-09-05 NOTE — ASSESSMENT & PLAN NOTE
SIRS on presentation 8/28 with tachycardia, tachypnea, and leukocytosis  Lactic acid negative on admission   Source: bilateral lower extremity leg wounds   Systolic blood pressures in the 80s -appropriately fluid resuscitated but continued to be hypotensive, and transferred to stepdown unit on 8/30 for hypotension  Wound cx 4+ staph aureas  BC x2 NG x5 days  Procal down trending   Trend temps and WBC   Echo 8/30: EF 60%  Cortisol 23, TSH 4.96, Free T4 1.03     Plan:   Off of levo but soft BPs overnight  9/1 midodrine 5mg po tid added, increased to 10mg tid 9/3  Abx D8: Vanc 9  Surgery following for BL LE wounds -- no need for surgical intervention at this time

## 2024-09-05 NOTE — PROGRESS NOTES
Barbie Medel is a 78 y.o. female who is currently ordered Vancomycin IV with management by the Pharmacy Consult service.  Relevant clinical data and objective / subjective history reviewed.  Vancomycin Assessment:  Indication and Goal AUC/Trough: Soft tissue (goal -600, trough >10), sepsis, -600, trough >10  Clinical Status: stable  Micro:     Renal Function:  SCr: 0.41 mg/dL  CrCl: 130.1 mL/min  Renal replacement: Not on dialysis  Days of Therapy: 8  Current Dose: 1250 mg IV q24h  Vancomycin Plan:  New Dosin mg IV q12h  Estimated AUC: 405 mcg*hr/mL  Estimated Trough: 11.4 mcg/mL  Next Level: 24 at 0600  Renal Function Monitoring: Daily BMP and UOP  Pharmacy will continue to follow closely for s/sx of nephrotoxicity, infusion reactions and appropriateness of therapy.  BMP and CBC will be ordered per protocol. We will continue to follow the patient’s culture results and clinical progress daily.    Saima Collins, Pharmacist

## 2024-09-05 NOTE — PROGRESS NOTES
[facility]  Progress Note  Name: Barbie Medel I  MRN: [id number]  Unit/Bed#: ICU 01 I Date of Admission: 8/28/2024   Date of Service: 9/5/2024 I Hospital Day: 7    Assessment & Plan   * Septic shock (HCC)  Assessment & Plan  SIRS on presentation 8/28 with tachycardia, tachypnea, and leukocytosis  Lactic acid negative on admission   Source: bilateral lower extremity leg wounds   Systolic blood pressures in the 80s -appropriately fluid resuscitated but continued to be hypotensive, and transferred to stepdown unit on 8/30 for hypotension  Wound cx 4+ staph aureas  BC x2 NG x5 days  Procal down trending   Trend temps and WBC   Echo 8/30: EF 60%  Cortisol 23, TSH 4.96, Free T4 1.03     Plan:   Off of levo but soft BPs overnight  9/1 midodrine 5mg po tid added, increased to 10mg tid 9/3  Abx D8: Vanc 9  Surgery following for BL LE wounds -- no need for surgical intervention at this time    Multiple open wounds of lower leg  Assessment & Plan  POA-multiple open wounds on bilateral lower extremities with erythema and drainage  Continue Vanc as above   Wound care following  Surgery following -- no indication for surgical intervention at this time   8/28 Venous duplex negative  LAM unreliable study due to open wounds, severe pain, and patient position    Plan:  PRN Tylenol for pain control  Scheduled wound care     Fall  Assessment & Plan  Fall in her bathroom complaining of her legs giving out  Was on the floor for 1-1/2 hours before activating 911  8/28 CT head negative  8/28 CT cervical spine negative  8/28 CT chest/abdomen/pelvis - Minimally displaced anterior lateral right rib 10th fracture  PT/OT, OOBTC  Fall precautions      Onychomycosis  Assessment & Plan  Noted in bilateral feet  Podiatry following    Ambulatory dysfunction  Assessment & Plan  Uses walker at baseline  PT/OT, fall precautions              Disposition: Stepdown Level 1    ICU Core Measures     A: Assess, Prevent, and  Manage Pain Has pain been assessed? Yes  Need for changes to pain regimen? No   B: Both SAT/SAT  N/A   C: Choice of Sedation RASS Goal: 0 Alert and Calm  Need for changes to sedation or analgesia regimen? No   D: Delirium CAM-ICU: Negative   E: Early Mobility  Plan for early mobility? Yes   F: Family Engagement Plan for family engagement today? Yes       Antibiotic Review: Patient on appropriate coverage based on culture data.  and Continue broad spectrum secondary to severity of illness.       Prophylaxis:  VTE VTE covered by:  enoxaparin, Subcutaneous, 40 mg at 09/04/24 0859       Stress Ulcer  not ordered         Significant 24hr Events     24hr events: no acute events overnight     Subjective   Review of Systems: Review of Systems     Objective                            Vitals I/O      Most Recent Min/Max in 24hrs   Temp 97.5 °F (36.4 °C) Temp  Min: 97.5 °F (36.4 °C)  Max: 98.5 °F (36.9 °C)   Pulse 68 Pulse  Min: 55  Max: 85   Resp (!) 23 Resp  Min: 13  Max: 33   BP (!) 81/60 BP  Min: 81/60  Max: 113/59   O2 Sat 95 % SpO2  Min: 94 %  Max: 98 %      Intake/Output Summary (Last 24 hours) at 9/5/2024 0538  Last data filed at 9/5/2024 0438  Gross per 24 hour   Intake 1827.7 ml   Output 400 ml   Net 1427.7 ml       Diet Regular; Regular House    Invasive Monitoring           Physical Exam   Physical Exam  Vitals and nursing note reviewed.   Eyes:      Pupils: Pupils are equal, round, and reactive to light.   Skin:     General: Skin is warm.   HENT:      Head: Normocephalic and atraumatic.      Mouth/Throat:      Mouth: Mucous membranes are moist.   Cardiovascular:      Rate and Rhythm: Normal rate.      Pulses: Normal pulses.   Abdominal:      Palpations: Abdomen is soft.   Constitutional:       General: She is not in acute distress.     Appearance: She is well-developed.   Pulmonary:      Effort: Pulmonary effort is normal. No respiratory distress.   Neurological:      General: No focal deficit present.      Mental  Status: Mental status is at baseline.            Diagnostic Studies      EKG:   Imaging:  I have personally reviewed pertinent reports.       Medications:  Scheduled PRN   cyanocobalamin, 1,000 mcg, Daily  enoxaparin, 40 mg, Q24H VIRGIL  ergocalciferol, 50,000 Units, Weekly  midodrine, 10 mg, TID AC  nystatin, , BID  saccharomyces boulardii, 250 mg, BID  sodium hypochlorite, 1 Application, Daily  thiamine, 100 mg, Daily  vancomycin, 1,000 mg, Q12H      acetaminophen, 650 mg, Q6H PRN       Continuous          Labs:    CBC    Recent Labs     09/03/24 0617 09/04/24  0437   WBC 6.40 8.52   HGB 11.2* 10.9*   HCT 35.4 35.7    304   BANDSPCT 1  --      BMP    Recent Labs     09/03/24  0617 09/04/24  0437   SODIUM 139 138   K 3.8 4.0   * 111*   CO2 23 24   AGAP 4 3*   BUN 16 14   CREATININE 0.46* 0.45*   CALCIUM 7.6* 8.0*       Coags    No recent results     Additional Electrolytes  Recent Labs     09/03/24  0617 09/04/24  0437   MG 2.2 2.0   PHOS 2.5 2.2*   CAIONIZED 1.01*  --           Blood Gas    No recent results  No recent results LFTs  No recent results    Infectious  Recent Labs     09/03/24  0617   PROCALCITONI 0.12     Glucose  Recent Labs     09/03/24  0617 09/04/24  0437   GLUC 101 92               Barbi Huggins PA-C

## 2024-09-06 LAB
ANION GAP SERPL CALCULATED.3IONS-SCNC: 4 MMOL/L (ref 4–13)
BUN SERPL-MCNC: 14 MG/DL (ref 5–25)
CALCIUM SERPL-MCNC: 8.2 MG/DL (ref 8.4–10.2)
CHLORIDE SERPL-SCNC: 110 MMOL/L (ref 96–108)
CO2 SERPL-SCNC: 26 MMOL/L (ref 21–32)
CREAT SERPL-MCNC: 0.45 MG/DL (ref 0.6–1.3)
ERYTHROCYTE [DISTWIDTH] IN BLOOD BY AUTOMATED COUNT: 20.5 % (ref 11.6–15.1)
GFR SERPL CREATININE-BSD FRML MDRD: 96 ML/MIN/1.73SQ M
GLUCOSE SERPL-MCNC: 84 MG/DL (ref 65–140)
HCT VFR BLD AUTO: 32.2 % (ref 34.8–46.1)
HGB BLD-MCNC: 9.8 G/DL (ref 11.5–15.4)
MAGNESIUM SERPL-MCNC: 2.2 MG/DL (ref 1.9–2.7)
MCH RBC QN AUTO: 26.6 PG (ref 26.8–34.3)
MCHC RBC AUTO-ENTMCNC: 30.4 G/DL (ref 31.4–37.4)
MCV RBC AUTO: 87 FL (ref 82–98)
PHOSPHATE SERPL-MCNC: 2.8 MG/DL (ref 2.3–4.1)
PLATELET # BLD AUTO: 292 THOUSANDS/UL (ref 149–390)
PMV BLD AUTO: 10 FL (ref 8.9–12.7)
POTASSIUM SERPL-SCNC: 3.8 MMOL/L (ref 3.5–5.3)
RBC # BLD AUTO: 3.69 MILLION/UL (ref 3.81–5.12)
SODIUM SERPL-SCNC: 140 MMOL/L (ref 135–147)
VANCOMYCIN SERPL-MCNC: 27.5 UG/ML (ref 10–20)
WBC # BLD AUTO: 6.96 THOUSAND/UL (ref 4.31–10.16)

## 2024-09-06 PROCEDURE — 85027 COMPLETE CBC AUTOMATED: CPT | Performed by: NURSE PRACTITIONER

## 2024-09-06 PROCEDURE — 83735 ASSAY OF MAGNESIUM: CPT | Performed by: NURSE PRACTITIONER

## 2024-09-06 PROCEDURE — 80202 ASSAY OF VANCOMYCIN: CPT | Performed by: NURSE PRACTITIONER

## 2024-09-06 PROCEDURE — 97530 THERAPEUTIC ACTIVITIES: CPT

## 2024-09-06 PROCEDURE — 97535 SELF CARE MNGMENT TRAINING: CPT

## 2024-09-06 PROCEDURE — 80048 BASIC METABOLIC PNL TOTAL CA: CPT | Performed by: NURSE PRACTITIONER

## 2024-09-06 PROCEDURE — 84100 ASSAY OF PHOSPHORUS: CPT | Performed by: NURSE PRACTITIONER

## 2024-09-06 PROCEDURE — 97110 THERAPEUTIC EXERCISES: CPT

## 2024-09-06 PROCEDURE — 99232 SBSQ HOSP IP/OBS MODERATE 35: CPT | Performed by: NURSE PRACTITIONER

## 2024-09-06 RX ORDER — ALBUMIN, HUMAN INJ 5% 5 %
25 SOLUTION INTRAVENOUS ONCE
Status: COMPLETED | OUTPATIENT
Start: 2024-09-06 | End: 2024-09-06

## 2024-09-06 RX ADMIN — MIDODRINE HYDROCHLORIDE 10 MG: 5 TABLET ORAL at 05:20

## 2024-09-06 RX ADMIN — ACETAMINOPHEN 650 MG: 325 TABLET ORAL at 09:02

## 2024-09-06 RX ADMIN — ENOXAPARIN SODIUM 40 MG: 40 INJECTION SUBCUTANEOUS at 09:02

## 2024-09-06 RX ADMIN — Medication 250 MG: at 16:39

## 2024-09-06 RX ADMIN — ALBUMIN (HUMAN) 25 G: 12.5 INJECTION, SOLUTION INTRAVENOUS at 03:32

## 2024-09-06 RX ADMIN — NYSTATIN 1 APPLICATION: 100000 POWDER TOPICAL at 09:04

## 2024-09-06 RX ADMIN — VANCOMYCIN HYDROCHLORIDE 1000 MG: 1 INJECTION, SOLUTION INTRAVENOUS at 21:48

## 2024-09-06 RX ADMIN — FLUDROCORTISONE ACETATE 0.1 MG: 0.1 TABLET ORAL at 09:04

## 2024-09-06 RX ADMIN — MIDODRINE HYDROCHLORIDE 10 MG: 5 TABLET ORAL at 16:39

## 2024-09-06 RX ADMIN — MIDODRINE HYDROCHLORIDE 10 MG: 5 TABLET ORAL at 11:41

## 2024-09-06 RX ADMIN — CYANOCOBALAMIN TAB 500 MCG 1000 MCG: 500 TAB at 09:03

## 2024-09-06 RX ADMIN — THIAMINE HCL TAB 100 MG 100 MG: 100 TAB at 09:03

## 2024-09-06 RX ADMIN — NYSTATIN: 100000 POWDER TOPICAL at 16:40

## 2024-09-06 RX ADMIN — HYOSCYAMINE SULFATE 1 APPLICATION: 16 SOLUTION at 09:05

## 2024-09-06 RX ADMIN — Medication 250 MG: at 09:03

## 2024-09-06 NOTE — ASSESSMENT & PLAN NOTE
Patient has multiple open wounds of bilateral lower extremities, erythema and drainage noted.  Blood cultures obtained and urine culture sent by ER.  Patient started on IV Ancef, will continue every 8 hours, transition to vancomycin after patient was found to have MSSA and wounds.  Wound care nurse consulted  Surgery following, no need for surgical intervention at this time  Venous duplex negative for DVT.  Vascular LAM difficult technical study secondary to severe calf and foot pain and open wounds.  Unable to obtain LAM.  Technician was able to obtain great toe waveform which appeared to be dampened bilaterally  Round-the-clock Tylenol ordered as patient does experience some discomfort  Monitor.

## 2024-09-06 NOTE — PHYSICAL THERAPY NOTE
"   PT TREATMENT       09/06/24 1520   PT Last Visit   PT Visit Date 09/06/24   Note Type   Note Type Treatment   Pain Assessment   Pain Assessment Tool FLACC   Pain Location/Orientation Orientation: Bilateral;Location: Leg   Pain Onset/Description Onset: Ongoing;Descriptor: Sore   Effect of Pain on Daily Activities limits mobility/activity tolerance   Patient's Stated Pain Goal No pain   Multiple Pain Sites No   Pain Rating: FLACC (Rest) - Face 0   Pain Rating: FLACC (Rest) - Legs 0   Pain Rating: FLACC (Rest) - Activity 0   Pain Rating: FLACC (Rest) - Cry 0   Pain Rating: FLACC (Rest) - Consolability 0   Score: FLACC (Rest) 0   Pain Rating: FLACC (Activity) - Face 1   Pain Rating: FLACC (Activity) - Legs 1   Pain Rating: FLACC (Activity) - Activity 1   Pain Rating: FLACC (Activity) - Cry 1   Pain Rating: FLACC (Activity) - Consolability 1   Score: FLACC (Activity) 5   Restrictions/Precautions   Weight Bearing Precautions Per Order No   Other Precautions Contact/isolation;Bed Alarm;Chair Alarm;Fall Risk;Pain;Hard of hearing   General   Chart Reviewed Yes   Family/Caregiver Present No   Cognition   Overall Cognitive Status WFL   Arousal/Participation Cooperative   Orientation Level Oriented X4   Following Commands Follows multistep commands with increased time or repetition   Subjective   Subjective \"I want to get back in bed\"   Bed Mobility   Supine to Sit Unable to assess   Sit to Supine 4  Minimal assistance   Additional items Assist x 1;Verbal cues;LE management   Transfers   Sit to Stand 2  Maximal assistance   Additional items Assist x 1;Verbal cues;Increased time required;Armrests   Stand to Sit 4  Minimal assistance   Additional items Assist x 1;Verbal cues   Ambulation/Elevation   Gait pattern Foward flexed;Short stride   Gait Assistance 3  Moderate assist   Additional items Assist x 1;Verbal cues   Assistive Device Rolling walker   Distance 2 feet to bed  (refusing further ambulation due to fatigue, pain) "   Stair Management Assistance Not tested   Balance   Static Sitting Fair   Dynamic Sitting Fair -   Static Standing Poor +   Dynamic Standing Poor +   Ambulatory Poor +  (RW)   Activity Tolerance   Activity Tolerance Patient limited by fatigue;Patient limited by pain   Nurse Made Aware yes RN Christal   Exercises   Neuro re-ed Able to perform supine exercise including ankle pumps, quad/glute sets, heel slides, hip abd, hip add squeezes x 10 reps bilat   Assessment   Prognosis Good   Problem List Decreased strength;Decreased range of motion;Decreased endurance;Impaired balance;Decreased mobility;Decreased coordination;Pain;Decreased skin integrity;Impaired hearing   Assessment Pt seen for PT session this afternoon. Pt received sitting in bedside chair + requesting to return to bed. Pt continues to require Mod/Max A to perform bed <> chair transfer, limited by BLE pain. Pt declines further ambulation due to pain. Repositioned in bed + able to perform exercise as mentioned above with intermittent assist to improve form/ROM. Pt will continue to benefit from skilled PT to address deficits to maximize IND for safe d/c.  The patient's AM-[facility] Basic Mobility Inpatient Short Form Raw Score is 12. A Raw score of less than or equal to 16 suggests the patient may benefit from discharge to post-acute rehabilitation services. Please also refer to the recommendation of the Physical Therapist for safe discharge planning.     Goals   Patient Goals to get better   Plan   Treatment/Interventions Functional transfer training;ADL retraining;LE strengthening/ROM;Therapeutic exercise;Endurance training;Bed mobility;Gait training;Spoke to nursing;OT   PT Frequency Other (Comment)  (5x/week)   Discharge Recommendation   Rehab Resource Intensity Level, PT II (Moderate Resource Intensity)   AM-PAC Basic Mobility Inpatient   Turning in Flat Bed Without Bedrails 3   Lying on Back to Sitting on Edge of Flat Bed Without Bedrails 2   Moving Bed to  Chair 2   Standing Up From Chair Using Arms 2   Walk in Room 2   Climb 3-5 Stairs With Railing 1   Basic Mobility Inpatient Raw Score 12   Basic Mobility Standardized Score 32.23   Turning Head Towards Sound 3   Follow Simple Instructions 3   Low Function Basic Mobility Raw Score  18   Low Function Basic Mobility Standardized Score  29.25   [facility] Highest Level Of Mobility   -[facility] Goal 4: Move to chair/commode   -HL Achieved 4: Move to chair/commode   Education   Education Provided Mobility training;Home exercise program   Patient Demonstrates verbal understanding   End of Consult   Patient Position at End of Consult Supine;All needs within reach;Bed/Chair alarm activated   Licensure   [place] License Number  Ivy Gee [id number]

## 2024-09-06 NOTE — CASE MANAGEMENT
Case Management Discharge Planning Note    Patient name Barbie Medel  Location ICU 01/ICU 01 MRN [id number]  : 1945 Date 2024       Current Admission Date: 2024  Current Admission Diagnosis:Septic shock (HCC)   Patient Active Problem List    Diagnosis Date Noted Date Diagnosed    Fall 2024     Septic shock (HCC) 2024     Multiple open wounds of lower leg 2024     Ambulatory dysfunction 2024     Onychomycosis 2024       LOS (days): 8  Geometric Mean LOS (GMLOS) (days): 3.6  Days to GMLOS:-4.3     OBJECTIVE:  Risk of Unplanned Readmission Score: 13.54     Current admission status: Inpatient   Preferred Pharmacy:   [facility] - [place], [place] - [address]  [address]  [facility] [address]  Phone: [phone] Fax: [phone]    Primary Care Provider: No primary care provider on file.    Primary Insurance: MEDICARE  Secondary Insurance:     DISCHARGE DETAILS:    Discharge planning discussed with:: Patient  Freedom of Choice: Yes  Comments - Freedom of Choice: SW following to assist with planning.  SW met with pt to review plan and IMM.  Pt confirmed plan is to transfer to [facility] upon discharge. Pt aware that discharge is anticipated for this weekend at this time.  Per pt her niece, Marlyn, agreed to help with pt with Medicaid application process for long term care.  SW will continue to follow to monitor progress and assist as needed.    Other Referral/Resources/Interventions Provided:  Interventions: Short Term Rehab  Referral Comments: STR at [facility] is planned.    Treatment Team Recommendation: Short Term Rehab  Discharge Destination Plan:: Short Term Rehab  Transport at Discharge : Wheelchair van    IMM Given (Date):: 24  IMM Given to:: Patient (IMM #2 reviewed with pt.  Pt verbalized understanding. Pt signed IMM and copy given. Copy also placed in scan bin for chart.)

## 2024-09-06 NOTE — PLAN OF CARE
Problem: OCCUPATIONAL THERAPY ADULT  Goal: Performs self-care activities at highest level of function for planned discharge setting.  See evaluation for individualized goals.  Description: Treatment Interventions: ADL retraining, Functional transfer training, UE strengthening/ROM, Endurance training, Patient/family training, Equipment evaluation/education, Compensatory technique education, Continued evaluation, Energy conservation, Activityengagement          See flowsheet documentation for full assessment, interventions and recommendations.   Outcome: Progressing  Note: Limitation: Decreased ADL status, Decreased UE strength, Decreased endurance, Decreased self-care trans, Decreased high-level ADLs (impaired pain, activity tolerance, standing tolerance, balance, LE edema, forward functional reach, insight into deficits)     Assessment: Pt seen for skilled OT tx session focusing on activity engagement, challenging activity tolerance / standing tolerance. Pt agreeable to participate and motivated to return to bed. Pt reports she has been sitting up for a long time. Pt continues to require max A to perform sit to stand. Pt required less physical assistance to engage in short distance functional mobility using RW w/ mod A. Pt engaged in grooming while seated and declined additional participation due to fatigue. Continue to recommend level II rehab resource intensity when medically stable. Will continue to follow     Rehab Resource Intensity Level, OT: II (Moderate Resource Intensity)

## 2024-09-06 NOTE — OCCUPATIONAL THERAPY NOTE
"  Occupational Therapy Progress Note     Patient Name: Barbie Medel  Today's Date: 9/6/2024  Problem List  Principal Problem:    Septic shock (HCC)  Active Problems:    Fall    Multiple open wounds of lower leg    Ambulatory dysfunction    Onychomycosis        09/06/24 1508   OT Last Visit   OT Visit Date 09/06/24  (Friday)   Note Type   Note Type Treatment  (tx [id number])   Pain Assessment   Pain Assessment Tool FLACC   Pain Location/Orientation Orientation: Bilateral;Location: Leg  (\"wounds\")   Effect of Pain on Daily Activities limits activity tolerance/ standing tolerance   Patient's Stated Pain Goal No pain   Pain Rating: FLACC (Rest) - Face 0   Pain Rating: FLACC (Rest) - Legs 0   Pain Rating: FLACC (Rest) - Activity 0   Pain Rating: FLACC (Rest) - Cry 0   Pain Rating: FLACC (Rest) - Consolability 0   Score: FLACC (Rest) 0   Pain Rating: FLACC (Activity) - Face 1   Pain Rating: FLACC (Activity) - Legs 1   Pain Rating: FLACC (Activity) - Activity 1   Pain Rating: FLACC (Activity) - Cry 2   Pain Rating: FLACC (Activity) - Consolability 1   Score: FLACC (Activity) 6   Restrictions/Precautions   Weight Bearing Precautions Per Order No   Other Precautions Contact/isolation;Chair Alarm;Bed Alarm;Multiple lines;Fall Risk;Pain;Telemetry;Hard of hearing   ADL   Where Assessed Chair  (vs supine HOB elevated)   Grooming Assistance 5  Supervision/Setup   Grooming Deficit Setup;Increased time to complete;Supervision/safety   Grooming Comments seated   Bed Mobility   Supine to Sit Unable to assess   Sit to Supine 4  Minimal assistance   Additional items Assist x 1;Increased time required;Bedrails;LE management;Verbal cues   Additional Comments Pt seated OOB in bedside recliner chair and supine HOB elevated at end of session w/ needs met, call bell in reach   Transfers   Sit to Stand 2  Maximal assistance   Additional items Assist x 1;Increased time required;Verbal cues;Bedrails;Armrests   Stand to Sit 4  Minimal " "assistance   Additional items Assist x 1;Increased time required;Verbal cues;Bedrails;Armrests   Additional Comments Reinforced/ educated pt on hand placement during sit <> stand transitions   Functional Mobility   Functional Mobility 3  Moderate assistance   Additional Comments engaged in short distance functional mobility using RW w/ mod A x1. Forward flexed / rounded posture and shuffling gait   Additional items Rolling walker   Subjective   Subjective \"I want to get back\" (stated upon therapist arrival)   Cognition   Overall Cognitive Status WFL   Arousal/Participation Alert;Cooperative   Attention Attends with cues to redirect  (+ Hard of hearing)   Orientation Level Oriented to person;Oriented to place;Oriented to situation   Memory   (appears WFL)   Following Commands Follows multistep commands with increased time or repetition   Comments Identified pt by full name and birthdate   Activity Tolerance   Activity Tolerance Patient limited by fatigue;Patient limited by pain   Medical Staff Made Aware spoke w/ Christal CASTANEDA and coordinated care w/ Ivy JUNIOR due to decreased activity tolerance, regression from baseline and skilled physical assistance required   Assessment   Assessment Pt seen for skilled OT tx session focusing on activity engagement, challenging activity tolerance / standing tolerance. Pt agreeable to participate and motivated to return to bed. Pt reports she has been sitting up for a long time. Pt continues to require max A to perform sit to stand. Pt required less physical assistance to engage in short distance functional mobility using RW w/ mod A. Pt engaged in grooming while seated and declined additional participation due to fatigue. Continue to recommend level II rehab resource intensity when medically stable. Will continue to follow   Plan   Treatment Interventions ADL retraining;Functional transfer training;UE strengthening/ROM;Endurance training;Patient/family training;Equipment " evaluation/education;Compensatory technique education;Continued evaluation;Energy conservation;Activityengagement   Goal Expiration Date 09/12/24   OT Treatment Day 1  (Friday 9/6/24)   OT Frequency 3-5x/wk   Discharge Recommendation   Rehab Resource Intensity Level, OT II (Moderate Resource Intensity)   AM-PAC Daily Activity Inpatient   Lower Body Dressing 2   Bathing 2   Toileting 2   Upper Body Dressing 3   Grooming 3   Eating 4   Daily Activity Raw Score 16   Daily Activity Standardized Score (Calc for Raw Score >=11) 35.96   AM-PAC Applied Cognition Inpatient   Following a Speech/Presentation 3   Understanding Ordinary Conversation 3   Taking Medications 2   Remembering Where Things Are Placed or Put Away 3   Remembering List of 4-5 Errands 3   Taking Care of Complicated Tasks 2   Applied Cognition Raw Score 16   Applied Cognition Standardized Score 35.03   Barthel Index   Feeding 10   Bathing 0   Grooming Score 0   Dressing Score 5   Bladder Score 5   Bowels Score 5   Toilet Use Score 5   Transfers (Bed/Chair) Score 5   Mobility (Level Surface) Score 0   Stairs Score 0   Barthel Index Score 35   End of Consult   Education Provided Yes   Patient Position at End of Consult Supine;All needs within reach   Nurse Communication Nurse aware of consult   Licensure   [place] License Number  Kimberlyn MARSHALLJean Sana, OTR/L [id number]         The patient's raw score on the AM-PAC Daily Activity Inpatient Short Form is 16. A raw score of less than 19 suggests the patient may benefit from discharge to post-acute rehabilitation services. Please refer to the recommendation of the Occupational Therapist for safe discharge planning.      Kimberlyn Hood, OTR/L  [id number]  [id number]

## 2024-09-06 NOTE — PROGRESS NOTES
Barbie Medel is a 78 y.o. female who is currently ordered Vancomycin IV with management by the Pharmacy Consult service.  Relevant clinical data and objective / subjective history reviewed.  Vancomycin Assessment:  Indication and Goal AUC/Trough: Soft tissue (goal -600, trough >10), sepsis, -600, trough >10  Clinical Status: stable  Micro: pending    Renal Function:  SCr: 0.45 mg/dL  CrCl: 121.6 mL/min  Renal replacement: Not on dialysis  Days of Therapy: 8  Current Dose: 1250 mg IV q24h  Vancomycin Plan:  New Dosing: Hold am dose today- last dose to be given at 2100  Estimated AUC: 491 mcg*hr/mL  Estimated Trough: 12.6 mcg/mL  Next Level: order to be discontinued- no trough scheduled  Renal Function Monitoring: Daily BMP and UOP  Pharmacy will continue to follow closely for s/sx of nephrotoxicity, infusion reactions and appropriateness of therapy.  BMP and CBC will be ordered per protocol. We will continue to follow the patient’s culture results and clinical progress daily.    Val Brooks, Pharmacist

## 2024-09-06 NOTE — ASSESSMENT & PLAN NOTE
"Patient sustained fall in her bathroom, reported that her legs \"just gave out\".  Denies striking head or loss of consciousness.  She was on the floor for 1-1/2 hours before she was able to get her phone off of a shelf and call 911.  She was brought to the emergency department for further evaluation and treatment.  In the ED CT of the head was performed which was negative for intra cranial abnormality, chronic microangiopathic changes, significant sinus mucosal disease of maxillary sinuses as per radiology report.  CT of cervical spine was also performed which showed no fracture or malalignment, severe multilevel degenerative changes.  Diminished osseous trabeculation most pronounced in the C6 vertebral body is likely due to low mineral density  CT chest abdomen pelvis was performed showing age-indeterminate minimally displaced anterior lateral right 10th rib fracture, 2.2 cm region of hypoattenuation in the inferior posterior right hepatic lobe favored to represent a prominent diaphragmatic slip (normal anatomy) where his laceration is less likely despite the age indeterminant right rib fracture as per radiology report.  PT/OT evaluation and treatment, recommending short-term rehab at this time  Continue fall precautions  Patient denies rib pain.  Plan for short-term rehab postdischarge      "

## 2024-09-06 NOTE — ASSESSMENT & PLAN NOTE
Sepsis present on admission evidenced by tachycardia, tachypnea, WBC 15.61.  Lactic acid negative  Suspect source most likely bilateral lower extremity leg wounds  Vancomycin IV provided to patient in ED, follow-up on MRSA swab.  Ancef 2 g IV provided to patient in ER, patient found to be positive for MSSA, currently on vancomycin IV, day 9.  On 8/30 patient was continued hypotension after being fluid resuscitated, transferred to stepdown unit for hypotension  Was placed on Levophed, continues with lower BP however no endorgan dysfunction.  Started on Florinef and midodrine  Blood cultures negative  Wound cultures demonstrate MSSA, on vancomycin day 9  Repeat CBC in a.m. and monitor fever curve  Patient reports improvement in the pain in her legs

## 2024-09-06 NOTE — PROGRESS NOTES
"[facility]  Progress Note  Name: Barbie Medel I  MRN: [id number]  Unit/Bed#: ICU 01 I Date of Admission: 8/28/2024   Date of Service: 9/6/2024 I Hospital Day: 8    Assessment & Plan   * Septic shock (HCC)  Assessment & Plan  Sepsis present on admission evidenced by tachycardia, tachypnea, WBC 15.61.  Lactic acid negative  Suspect source most likely bilateral lower extremity leg wounds  Vancomycin IV provided to patient in ED, follow-up on MRSA swab.  Ancef 2 g IV provided to patient in ER, patient found to be positive for MSSA, currently on vancomycin IV, day 9.  On 8/30 patient was continued hypotension after being fluid resuscitated, transferred to stepdown unit for hypotension  Was placed on Levophed, continues with lower BP however no endorgan dysfunction.  Started on Florinef and midodrine  Blood cultures negative  Wound cultures demonstrate MSSA, on vancomycin day 9  Repeat CBC in a.m. and monitor fever curve  Patient reports improvement in the pain in her legs    Multiple open wounds of lower leg  Assessment & Plan  Patient has multiple open wounds of bilateral lower extremities, erythema and drainage noted.  Blood cultures obtained and urine culture sent by ER.  Patient started on IV Ancef, will continue every 8 hours, transition to vancomycin after patient was found to have MSSA and wounds.  Wound care nurse consulted  Surgery following, no need for surgical intervention at this time  Venous duplex negative for DVT.  Vascular LAM difficult technical study secondary to severe calf and foot pain and open wounds.  Unable to obtain LAM.  Technician was able to obtain great toe waveform which appeared to be dampened bilaterally  Round-the-clock Tylenol ordered as patient does experience some discomfort  Monitor.    Fall  Assessment & Plan  Patient sustained fall in her bathroom, reported that her legs \"just gave out\".  Denies striking head or loss of consciousness.  She was on the " floor for 1-1/2 hours before she was able to get her phone off of a shelf and call 911.  She was brought to the emergency department for further evaluation and treatment.  In the ED CT of the head was performed which was negative for intra cranial abnormality, chronic microangiopathic changes, significant sinus mucosal disease of maxillary sinuses as per radiology report.  CT of cervical spine was also performed which showed no fracture or malalignment, severe multilevel degenerative changes.  Diminished osseous trabeculation most pronounced in the C6 vertebral body is likely due to low mineral density  CT chest abdomen pelvis was performed showing age-indeterminate minimally displaced anterior lateral right 10th rib fracture, 2.2 cm region of hypoattenuation in the inferior posterior right hepatic lobe favored to represent a prominent diaphragmatic slip (normal anatomy) where his laceration is less likely despite the age indeterminant right rib fracture as per radiology report.  PT/OT evaluation and treatment, recommending short-term rehab at this time  Continue fall precautions  Patient denies rib pain.  Plan for short-term rehab postdischarge        Ambulatory dysfunction  Assessment & Plan  Patient reports that she has been using a walker or holding onto furniture when she has been ambulating at home.  As noted above sustained a fall morning of presentation secondary to her legs giving out on her.  Spoke with patient's friend Liyah who indicated that patient is a hoarder, and condition of the house is not in good shape.  PT/OT evaluation and treatment ordered, recommend short-term rehab at this time    Onychomycosis  Assessment & Plan  Noted bilateral feet  Podiatry saw patient, were able to debride all 10 toenails.  Signed off.               VTE Pharmacologic Prophylaxis: VTE Score: 6 High Risk (Score >/= 5) - Pharmacological DVT Prophylaxis Ordered: enoxaparin (Lovenox). Sequential Compression Devices  "Ordered.    Mobility:   Basic Mobility Inpatient Raw Score: 12  JH-HLM Goal: 4: Move to chair/commode  JH-HLM Achieved: 4: Move to chair/commode  JH-HLM Goal achieved. Continue to encourage appropriate mobility.    Patient Centered Rounds: I performed bedside rounds with nursing staff today.   Discussions with Specialists or Other Care Team Provider: multidisciplinary team     Education and Discussions with Family / Patient:  Patient indicated she would call her friend .     Total Time Spent on Date of Encounter in care of patient: greater than 45 mins. This time was spent on one or more of the following: performing physical exam; counseling and coordination of care; obtaining or reviewing history; documenting in the medical record; reviewing/ordering tests, medications or procedures; communicating with other healthcare professionals and discussing with patient's family/caregivers.    Current Length of Stay: 8 day(s)  Current Patient Status: Inpatient   Certification Statement: The patient will continue to require additional inpatient hospital stay due to IV abx, repeat labs. Wound care   Discharge Plan: Anticipate discharge in 48 hrs to rehab facility.    Code Status: Level 1 - Full Code    Subjective:   \" My stomach was bothering me last night, I felt nauseous but I did not throw up.  I feel better after eating this morning.\".  Patient reports that she did sleep okay last night, feels that her legs are feeling better however when they do the dressing changes it is very painful.  Denies any fevers or chills.  No diarrhea.    Objective:     Vitals:   Temp (24hrs), Av.2 °F (36.8 °C), Min:97.5 °F (36.4 °C), Max:98.6 °F (37 °C)    Temp:  [97.5 °F (36.4 °C)-98.6 °F (37 °C)] 98.6 °F (37 °C)  HR:  [62-81] 81  Resp:  [15-37] 18  BP: ()/(47-58) 97/57  SpO2:  [93 %-97 %] 93 %  Body mass index is 38.75 kg/m².     Input and Output Summary (last 24 hours):     Intake/Output Summary (Last 24 hours) at 2024 " 1449  Last data filed at 9/6/2024 0800  Gross per 24 hour   Intake 950 ml   Output 250 ml   Net 700 ml       Physical Exam:   Physical Exam  Vitals and nursing note reviewed.   Constitutional:       Comments: Chronically ill appearing female resting in bed    HENT:      Head: Normocephalic.      Ears:      Comments: Hard of hearing      Nose: Nose normal.      Mouth/Throat:      Mouth: Mucous membranes are moist.   Eyes:      Extraocular Movements: Extraocular movements intact.      Conjunctiva/sclera: Conjunctivae normal.   Cardiovascular:      Rate and Rhythm: Normal rate and regular rhythm.      Pulses: Normal pulses.      Heart sounds: Normal heart sounds.   Pulmonary:      Effort: Pulmonary effort is normal.      Breath sounds: Normal breath sounds.   Abdominal:      General: Bowel sounds are normal.      Palpations: Abdomen is soft.   Genitourinary:     Comments: Voiding spontaneously   Musculoskeletal:      Cervical back: Normal range of motion.      Comments: Generalized deconditioning    Skin:     Capillary Refill: Capillary refill takes less than 2 seconds.      Comments: Bilateral LE with dressings; strike through drainage noted on right leg   Neurological:      General: No focal deficit present.      Mental Status: She is alert and oriented to person, place, and time.   Psychiatric:         Mood and Affect: Mood normal.         Behavior: Behavior normal.         Thought Content: Thought content normal.         Judgment: Judgment normal.          Additional Data:     Labs:  Results from last 7 days   Lab Units 09/06/24  0531 09/05/24  0556 09/04/24  0437 09/03/24  0617   WBC Thousand/uL 6.96 6.74   < > 6.40   HEMOGLOBIN g/dL 9.8* 9.9*   < > 11.2*   HEMATOCRIT % 32.2* 32.2*   < > 35.4   PLATELETS Thousands/uL 292 267   < > 333   BANDS PCT %  --   --   --  1   SEGS PCT %  --  70   < >  --    LYMPHO PCT %  --  17   < > 15   MONO PCT %  --  9   < > 5   EOS PCT %  --  1   < > 2    < > = values in this interval  not displayed.     Results from last 7 days   Lab Units 09/06/24  0531   SODIUM mmol/L 140   POTASSIUM mmol/L 3.8   CHLORIDE mmol/L 110*   CO2 mmol/L 26   BUN mg/dL 14   CREATININE mg/dL 0.45*   ANION GAP mmol/L 4   CALCIUM mg/dL 8.2*   GLUCOSE RANDOM mg/dL 84                 Results from last 7 days   Lab Units 09/03/24  0617 08/31/24  0557   PROCALCITONIN ng/ml 0.12 1.20*       Lines/Drains:  Invasive Devices       Peripheral Intravenous Line  Duration             Long-Dwell Peripheral IV (Midline) 08/30/24 Left Brachial 7 days                          Imaging: No pertinent imaging reviewed.    Recent Cultures (last 7 days):         Last 24 Hours Medication List:   Current Facility-Administered Medications   Medication Dose Route Frequency Provider Last Rate    acetaminophen  650 mg Oral Q6H PRN Magui Spironello V, CRNP      cyanocobalamin  1,000 mcg Oral Daily Magui Spironello V, CRNP      enoxaparin  40 mg Subcutaneous Q24H VIRGIL Magui Spironello V, CRNP      ergocalciferol  50,000 Units Oral Weekly Magui Spironello V, CRNP      fludrocortisone  0.1 mg Oral Daily Magui Spironello V, CRNP      midodrine  10 mg Oral TID AC Magui Spironello V, CRNP      nystatin   Topical BID Magui Spironello V, CRNP      saccharomyces boulardii  250 mg Oral BID Magui Spironello V, CRNP      sodium hypochlorite  1 Application Irrigation Daily Magui Spironello V, CRNP      thiamine  100 mg Oral Daily Magui Spironello V, CRNP      vancomycin  1,000 mg Intravenous Q12H Magui Spironello V, CRNP Stopped (09/05/24 4418)        Today, Patient Was Seen By: LEEROY Monae    **Please Note: This note may have been constructed using a voice recognition system.**

## 2024-09-07 VITALS
DIASTOLIC BLOOD PRESSURE: 52 MMHG | HEIGHT: 64 IN | WEIGHT: 225.75 LBS | SYSTOLIC BLOOD PRESSURE: 85 MMHG | RESPIRATION RATE: 16 BRPM | BODY MASS INDEX: 38.54 KG/M2 | OXYGEN SATURATION: 95 % | TEMPERATURE: 98.5 F | HEART RATE: 76 BPM

## 2024-09-07 PROBLEM — E55.9 VITAMIN D DEFICIENCY: Status: ACTIVE | Noted: 2024-09-07

## 2024-09-07 LAB
ANION GAP SERPL CALCULATED.3IONS-SCNC: 3 MMOL/L (ref 4–13)
BUN SERPL-MCNC: 15 MG/DL (ref 5–25)
CALCIUM SERPL-MCNC: 7.8 MG/DL (ref 8.4–10.2)
CHLORIDE SERPL-SCNC: 110 MMOL/L (ref 96–108)
CO2 SERPL-SCNC: 26 MMOL/L (ref 21–32)
CREAT SERPL-MCNC: 0.47 MG/DL (ref 0.6–1.3)
ERYTHROCYTE [DISTWIDTH] IN BLOOD BY AUTOMATED COUNT: 20.7 % (ref 11.6–15.1)
GFR SERPL CREATININE-BSD FRML MDRD: 95 ML/MIN/1.73SQ M
GLUCOSE SERPL-MCNC: 92 MG/DL (ref 65–140)
HCT VFR BLD AUTO: 30.6 % (ref 34.8–46.1)
HGB BLD-MCNC: 9.3 G/DL (ref 11.5–15.4)
MCH RBC QN AUTO: 26.9 PG (ref 26.8–34.3)
MCHC RBC AUTO-ENTMCNC: 30.4 G/DL (ref 31.4–37.4)
MCV RBC AUTO: 88 FL (ref 82–98)
PLATELET # BLD AUTO: 298 THOUSANDS/UL (ref 149–390)
PMV BLD AUTO: 9.8 FL (ref 8.9–12.7)
POTASSIUM SERPL-SCNC: 3.8 MMOL/L (ref 3.5–5.3)
RBC # BLD AUTO: 3.46 MILLION/UL (ref 3.81–5.12)
SODIUM SERPL-SCNC: 139 MMOL/L (ref 135–147)
WBC # BLD AUTO: 6.65 THOUSAND/UL (ref 4.31–10.16)

## 2024-09-07 PROCEDURE — 99239 HOSP IP/OBS DSCHRG MGMT >30: CPT | Performed by: NURSE PRACTITIONER

## 2024-09-07 PROCEDURE — 80048 BASIC METABOLIC PNL TOTAL CA: CPT | Performed by: NURSE PRACTITIONER

## 2024-09-07 PROCEDURE — 85027 COMPLETE CBC AUTOMATED: CPT | Performed by: NURSE PRACTITIONER

## 2024-09-07 RX ORDER — LANOLIN ALCOHOL/MO/W.PET/CERES
100 CREAM (GRAM) TOPICAL DAILY
Start: 2024-09-08

## 2024-09-07 RX ORDER — MIDODRINE HYDROCHLORIDE 10 MG/1
10 TABLET ORAL
Start: 2024-09-07

## 2024-09-07 RX ORDER — NYSTATIN 100000 [USP'U]/G
POWDER TOPICAL 2 TIMES DAILY
Start: 2024-09-07

## 2024-09-07 RX ORDER — FLUDROCORTISONE ACETATE 0.1 MG/1
0.1 TABLET ORAL DAILY
Start: 2024-09-08

## 2024-09-07 RX ORDER — ACETAMINOPHEN 325 MG/1
650 TABLET ORAL EVERY 6 HOURS PRN
Start: 2024-09-07

## 2024-09-07 RX ORDER — SODIUM HYPOCHLORITE 2.5 MG/ML
1 SOLUTION TOPICAL DAILY
Start: 2024-09-08

## 2024-09-07 RX ORDER — ERGOCALCIFEROL 1.25 MG/1
50000 CAPSULE, LIQUID FILLED ORAL WEEKLY
Start: 2024-09-12

## 2024-09-07 RX ADMIN — THIAMINE HCL TAB 100 MG 100 MG: 100 TAB at 09:08

## 2024-09-07 RX ADMIN — MIDODRINE HYDROCHLORIDE 10 MG: 5 TABLET ORAL at 06:17

## 2024-09-07 RX ADMIN — NYSTATIN 1 APPLICATION: 100000 POWDER TOPICAL at 09:09

## 2024-09-07 RX ADMIN — CYANOCOBALAMIN TAB 500 MCG 1000 MCG: 500 TAB at 09:08

## 2024-09-07 RX ADMIN — FLUDROCORTISONE ACETATE 0.1 MG: 0.1 TABLET ORAL at 09:09

## 2024-09-07 RX ADMIN — HYOSCYAMINE SULFATE 1 APPLICATION: 16 SOLUTION at 09:09

## 2024-09-07 RX ADMIN — ENOXAPARIN SODIUM 40 MG: 40 INJECTION SUBCUTANEOUS at 09:07

## 2024-09-07 RX ADMIN — MIDODRINE HYDROCHLORIDE 10 MG: 5 TABLET ORAL at 11:46

## 2024-09-07 RX ADMIN — Medication 250 MG: at 09:08

## 2024-09-07 NOTE — ASSESSMENT & PLAN NOTE
Patient has multiple open wounds of bilateral lower extremities, erythema and drainage noted.  Blood cultures obtained and urine culture sent by ER.  Patient started on IV Ancef, will continue every 8 hours, transition to vancomycin after patient was found to have MSSA and wounds.  Wound care nurse consulted  Surgery following, no need for surgical intervention at this time  Venous duplex negative for DVT.  Vascular LAM difficult technical study secondary to severe calf and foot pain and open wounds.  Unable to obtain LAM.  Technician was able to obtain great toe waveform which appeared to be dampened bilaterally  Round-the-clock Tylenol ordered as patient does experience some discomfort  Continue wound care as outlined  Ambulatory referral to wound care center made, contact information provided in discharge instructions

## 2024-09-07 NOTE — [place] UNIVERSAL TRANSFER FORM
"[place] UNIVERSAL TRANSFER FORM  (ALL ITEMS MUST BE COMPLETED)    1. TRANSFER FROM: [facility]      TRANSFER TO: [facility]    2. DATE OF TRANSFER: 9/7/2024                        TIME OF TRANSFER: 1530    3. PATIENT NAME: Barbie Medel G      YOB: 1945                             GENDER: female    4. LANGUAGE:   English    5. PHYSICIAN NAME:  Arely Grant MD                   PHONE: [phone]    6. CODE STATUS: Level 1 - Full Code        Out of Hospital DNR Attached: No    7. :                                      :  Extended Emergency Contact Information  Primary Emergency Contact: Liyah Gordon  Mobile Phone: [phone]  Relation: Friend  Secondary Emergency Contact: Marlyn Reed  Mobile Phone: [phone]  Relation: Niece   needed? No           Health Care Representative/Proxy:  No           Legal Guardian:  No             NAME OF:           HEALTH CARE REPRESENTATIVE/PROXY:                                         OR           LEGAL GUARDIAN, IF NOT :                                               PHONE:  (Day)           (Night)                        (Cell)    8. REASON FOR TRANSFER: (Must include brief medical history and recent changes in physical function or cognition.) STR              V/S: BP (!) 85/52 (BP Location: Left arm)   Pulse 76   Temp 98.5 °F (36.9 °C) (Temporal)   Resp 16   Ht 5' 4\" (1.626 m)   Wt 102 kg (225 lb 12 oz)   SpO2 95%   BMI 38.75 kg/m²           PAIN: None    9. PRIMARY DIAGNOSIS: Septic shock (HCC)      Secondary Diagnosis:         Pacemaker: No      Internal Defib: No          Mental Health Diagnosis (if Applicable):    10. RESTRAINTS: No     11. RESPIRATORY NEEDS: None    12. ISOLATION/PRECAUTION: MRSA    13. ALLERGY: Augmentin [amoxicillin-pot clavulanate], Codeine, and Erythromycin    14. SENSORY:       Vision Poor and Hearing " Poor    15. SKIN CONDITION: Yes:  Vascular    16. DIET: Regular    17. IV ACCESS: None    18. PERSONAL ITEMS SENT WITH PATIENT: Glasses    19. ATTACHED DOCUMENTS: MUST ATTACH CURRENT MEDICATION INFORMATION MAR    20. AT RISK ALERTS:Falls        HARM TO: N/A    21. WEIGHT BEARING STATUS:         Left Leg: Limited        Right Leg: Limited    22. MENTAL STATUS:Alert    23. FUNCTION:        Walk: With Help        Transfer: With Help        Toilet: With Help        Feed: Self    24. IMMUNIZATIONS/SCREENING:     There is no immunization history on file for this patient.    25. BOWEL: Continent    26. BLADDER: Continent    27. SENDING FACILITY CONTACT: Christal Vann RN                  Title: RN        Unit: ICU        Phone: [phone]          REC'G FACILITY CONTACT (if known):        Title:        Unit:         Phone:         FORM PREFILLED BY (if applicable)       Title:       Unit:        Phone:         FORM COMPLETED BY Christal Vann RN      Title: RN      Phone:

## 2024-09-07 NOTE — DISCHARGE SUMMARY
[facility]  Discharge- Barbie Medel 1945, 78 y.o. female MRN: [id number]  Unit/Bed#: ICU 01 Encounter: [id number]  Primary Care Provider: No primary care provider on file.   Date and time admitted to hospital: 8/28/2024  7:34 AM    * Septic shock (HCC)  Assessment & Plan  Sepsis present on admission evidenced by tachycardia, tachypnea, WBC 15.61.  Lactic acid negative  Suspect source most likely bilateral lower extremity leg wounds  Vancomycin IV provided to patient in ED, follow-up on MRSA swab.  Ancef 2 g IV provided to patient in ER, patient found to be positive for MSSA, currently on vancomycin IV, day 9.  On 8/30 patient was continued hypotension after being fluid resuscitated, transferred to stepdown unit for hypotension  Was placed on Levophed, continues with lower BP however no endorgan dysfunction.  Started on Florinef and midodrine  Blood cultures negative  Wound cultures demonstrate MSSA, completed course of IV Vancomycin  Patient will be transitioning to complete care at [place] with the intention of this becoming her long-term care facility  Patient will be discharged today    Multiple open wounds of lower leg  Assessment & Plan  Patient has multiple open wounds of bilateral lower extremities, erythema and drainage noted.  Blood cultures obtained and urine culture sent by ER.  Patient started on IV Ancef, will continue every 8 hours, transition to vancomycin after patient was found to have MSSA and wounds.  Wound care nurse consulted  Surgery following, no need for surgical intervention at this time  Venous duplex negative for DVT.  Vascular LAM difficult technical study secondary to severe calf and foot pain and open wounds.  Unable to obtain LAM.  Technician was able to obtain great toe waveform which appeared to be dampened bilaterally  Round-the-clock Tylenol ordered as patient does experience some discomfort  Continue wound care as outlined  Ambulatory referral  "to wound care center made, contact information provided in discharge instructions    Fall  Assessment & Plan  Patient sustained fall in her bathroom, reported that her legs \"just gave out\".  Denies striking head or loss of consciousness.  She was on the floor for 1-1/2 hours before she was able to get her phone off of a shelf and call 911.  She was brought to the emergency department for further evaluation and treatment.  In the ED CT of the head was performed which was negative for intra cranial abnormality, chronic microangiopathic changes, significant sinus mucosal disease of maxillary sinuses as per radiology report.  CT of cervical spine was also performed which showed no fracture or malalignment, severe multilevel degenerative changes.  Diminished osseous trabeculation most pronounced in the C6 vertebral body is likely due to low mineral density  CT chest abdomen pelvis was performed showing age-indeterminate minimally displaced anterior lateral right 10th rib fracture, 2.2 cm region of hypoattenuation in the inferior posterior right hepatic lobe favored to represent a prominent diaphragmatic slip (normal anatomy) where his laceration is less likely despite the age indeterminant right rib fracture as per radiology report.  PT/OT evaluation and treatment, recommending short-term rehab at this time  Continue fall precautions  As noted above patient will be transitioning to complete care at [place] with intention of this becoming her long-term care facility.  Patient is discharged today      Ambulatory dysfunction  Assessment & Plan  Patient reports that she has been using a walker or holding onto furniture when she has been ambulating at home.  As noted above sustained a fall morning of presentation secondary to her legs giving out on her.  Spoke with patient's friend Liyah who indicated that patient is a hoarder, and condition of the house is not in good shape.  PT/OT evaluation and treatment ordered, " recommend short-term rehab at this time  Patient reports her plan is to go to complete care at [place] which will eventually become her long-term care facility  Patient is discharged today.    Onychomycosis  Assessment & Plan  Noted bilateral feet  Podiatry saw patient, were able to debride all 10 toenails.  Signed off.  Follow up as necessary  Discharge to rehab today         Medical Problems       Resolved Problems  Date Reviewed: 9/7/2024            Resolved    Hyponatremia 8/31/2024     Resolved by  LEEROY Fan    Metabolic acidosis 9/3/2024     Resolved by  LEEROY Lanier        Discharging Physician / Practitioner: LEEROY Monae  PCP: No primary care provider on file.  Admission Date:   Admission Orders (From admission, onward)       Ordered        08/29/24 1502  INPATIENT ADMISSION  Once            08/28/24 1142  Place in Observation  Once                          Discharge Date: 09/07/24    Consultations During Hospital Stay:  Surgery  Wound care nurse   Critical care     Procedures Performed:   Cardiogram performed on 8/30/2024 shows EF of 60%, normal systolic function, normal wall motion, normal diastolic function.  Mild regurgitation of tricuspid valve and mild regurgitation of pulmonic valve as per cardiology report.    Significant Findings / Test Results:   CT of chest abdomen and pelvis performed on 8/28/2024 shows age-indeterminate minimally displaced anterior lateral right 10th rib fracture, 2.2 cm region of hypoattenuation in the inferior posterior right hepatic lobe is favored to represent a prominent diaphragmatic slip (normal anatomy) whereas a laceration is considered less likely despite the slightly more inferior age-indeterminate anterior lateral right 10th rib fracture.  Interstitial reticulation of the right lower lobe is likely atelectasis given the elevation of the adjacent diaphragm as per radiology report.  CT cervical spine performed on 8/28/2024  shows no cervical spine fracture or traumatic malalignment.  Severe multilevel degenerative change.  Diminished osseous trabeculation most pronounced at the C6 vertebral body is likely due to low mineral density as per radiology report.  CT of the head performed on 8/28/2024 shows no acute intracranial abnormality, chronic microangiopathic changes, significant sinus mucosal disease of the maxillary sinuses as per radiology report  X-ray right foot performed 8/30/2024 shows no acute osseous abnormality, no radiographic findings of osteomyelitis.  Left foot x-ray performed 8/30/2024 shows no acute osseous abnormality, no radiographic findings of osteomyelitis.  Left ankle x-ray performed 8/30/2024 shows no acute osseous abnormality, no radiographic findings of osteomyelitis as per radiology report.  Right ankle x-ray performed 8/30/2024 shows no acute osseous abnormality, no soft tissue gas as per radiology report.  X-ray tibia-fibula on the right performed 8/30/2024 shows no acute osseous abnormality, no radiographic findings of osteomyelitis as per radiology report.  X-ray of left tibia-fibula performed on 8/30/2024 showed no acute osseous abnormality, no radiographic findings of osteomyelitis as per radiology report.  CT lower extremity with contrast left performed on 9/3/2024 shows subcutaneous edema and skin thickening bilaterally in the posterior thighs and extending from mid calf through the feet which can be seen with cellulitis.  No fluid collections.  No soft tissue gas as per radiology report.  CT of right lower extremity performed on 9/3/2024 shows subcutaneous edema and skin thickening bilaterally in the posterior thighs and extending from the mid calf through the feet which can be seen with cellulitis.  No fluid collections.  No soft tissue gas as per radiology report.    Incidental Findings:   None        Test Results Pending at Discharge (will require follow up):   None      Outpatient Tests  "Requested:  Would recheck Vitamin D level in the next four weeks and adjust dosing accordingly     Complications: Hypotension    Reason for Admission:      Hospital Course:   Barbie Medel is a 78 y.o. female patient no significant past medical history who originally presented to the hospital on 8/28/2024 due to fall.  Patient reported a mechanical fall in the bathroom where her \"legs just gave out\".  She came to the ED where she was found to be septic with chronic bilateral lower extremity wounds.  Patient was started on Ancef, admitted for further evaluation and treatment.  Wound care team, surgery and podiatry were all consulted.  Wound care recommended daily changes of dressings with Dakin solution, and follow-up with outpatient wound care on discharge.  Surgery indicated no surgical intervention required at this time.  Podiatry was able to debride the patient's 10 toenails, signed off at this time, follow-up as necessary outpatient.  Wound culture came back positive for MSSA, and she received a course of IV vancomycin.  Patient was noted to have some hypotension, did receive fluid resuscitation however continued with persistent hypotension.  She was transferred to stepdown for initiation of vasopressors.  There is mild improvement with vasopressors, no endorgan dysfunction was noted.  Patient was placed on midodrine 10 mg p.o. 3 times daily and Florinef 0.1 mg daily.  Goal is to maintain mean arterial pressures of 60 or greater.  Patient was also noted to have vitamin D deficiency with a level less than 7.0, started on vitamin D supplementation 50,000 units weekly, would recommend her to continue this and recheck a vitamin D level in about 4 weeks to reassess and adjust dosing accordingly.  She was also started on vitamin B12 and thiamine which she will continue on discharge.  PT/OT evaluation was performed, recommending that patient go to short-term rehab.  Case management involved, patient indicated that " "she does not think that she can live by herself any longer and the plan is for her to go to complete care at [place] for rehab and transition to long-term care.            Please see above list of diagnoses and related plan for additional information.     Condition at Discharge: good    Discharge Day Visit / Exam:     Subjective:    Patient reports that she slept well last night, good appetite no nausea or vomiting.  Did have some leg discomfort with the dressing changes.  She is hopeful that she will be going to rehab today, she indicated that she would be pursuing long-term care at the facility and seemed rather pleased that she was going to make new friends and be able to participate in activities.    Vitals: Blood Pressure: (!) 85/52 (09/07/24 0800)  Pulse: 76 (09/07/24 0800)  Temperature: 98.5 °F (36.9 °C) (09/07/24 0800)  Temp Source: Temporal (09/07/24 0800)  Respirations: 16 (09/07/24 0800)  Height: 5' 4\" (162.6 cm) (08/30/24 1020)  Weight - Scale: 102 kg (225 lb 12 oz) (09/06/24 0525)  SpO2: 95 % (09/07/24 0800)    Exam:   Physical Exam  Vitals and nursing note reviewed.   Constitutional:       Appearance: She is obese.      Comments: Chronically ill appearing female resting comfortably in bed    HENT:      Head: Normocephalic.      Ears:      Comments: Hard of hearing      Nose: Nose normal.      Mouth/Throat:      Mouth: Mucous membranes are moist.   Eyes:      Extraocular Movements: Extraocular movements intact.      Conjunctiva/sclera: Conjunctivae normal.   Cardiovascular:      Rate and Rhythm: Normal rate and regular rhythm.      Pulses: Normal pulses.      Heart sounds: Normal heart sounds.   Pulmonary:      Effort: Pulmonary effort is normal.      Breath sounds: Normal breath sounds.   Abdominal:      General: Bowel sounds are normal. There is no distension.      Palpations: Abdomen is soft.      Tenderness: There is no abdominal tenderness.   Genitourinary:     Comments: Voiding spontaneously "   Musculoskeletal:      Cervical back: Normal range of motion.      Comments: Generalized deconditioning    Skin:     Capillary Refill: Capillary refill takes less than 2 seconds.      Comments: Bilateral LE dressings present   Neurological:      General: No focal deficit present.      Mental Status: She is alert and oriented to person, place, and time.   Psychiatric:         Mood and Affect: Mood normal.         Behavior: Behavior normal.         Thought Content: Thought content normal.         Judgment: Judgment normal.          Discussion with Family: Attempted to update  (niece) via phone. Left voicemail.     Discharge instructions/Information to patient and family:   See after visit summary for information provided to patient and family.      Provisions for Follow-Up Care:  See after visit summary for information related to follow-up care and any pertinent home health orders.      Mobility at time of Discharge:   Basic Mobility Inpatient Raw Score: 12  JH-HLM Goal: 4: Move to chair/commode  JH-HLM Achieved: 4: Move to chair/commode  HLM Goal achieved. Continue to encourage appropriate mobility.     Disposition:   Other Skilled Nursing [facility] at [facility]     Planned Readmission: No      Discharge Statement:  I spent greater than 45 minutes discharging the patient. This time was spent on the day of discharge. I had direct contact with the patient on the day of discharge. Greater than 50% of the total time was spent examining patient, answering all patient questions, arranging and discussing plan of care with patient as well as directly providing post-discharge instructions.  Additional time then spent on discharge activities.    Discharge Medications:  See after visit summary for reconciled discharge medications provided to patient and/or family.      **Please Note: This note may have been constructed using a voice recognition system**

## 2024-09-07 NOTE — ASSESSMENT & PLAN NOTE
Patient's vitamin D level was found to be less than 7.0.  Patient is started on vitamin D 50,000 units weekly during hospitalization.  Would recommend to check vitamin D level again in 4 weeks and adjust accordingly.  Patient also placed on B12 supplements, continue.  Patient also placed on thiamine 100 mg p.o. daily continue.

## 2024-09-07 NOTE — NURSING NOTE
Pt transferred via wheelchair to [facility]. No complaints offered and no distress noted

## 2024-09-07 NOTE — ASSESSMENT & PLAN NOTE
Patient reports that she has been using a walker or holding onto furniture when she has been ambulating at home.  As noted above sustained a fall morning of presentation secondary to her legs giving out on her.  Spoke with patient's friend Liyah who indicated that patient is a hoarder, and condition of the house is not in good shape.  PT/OT evaluation and treatment ordered, recommend short-term rehab at this time  Patient reports her plan is to go to complete care at [place] which will eventually become her long-term care facility  Patient is discharged today.

## 2024-09-07 NOTE — [place] UNIVERSAL TRANSFER FORM
"[place] UNIVERSAL TRANSFER FORM  (ALL ITEMS MUST BE COMPLETED)    1. TRANSFER FROM: [facility] [address]      TRANSFER TO: [facility]    2. DATE OF TRANSFER: 9/7/2024                        TIME OF TRANSFER: 1530    3. PATIENT NAME: Barbie Medel G      YOB: 1945                             GENDER: female    4. LANGUAGE:   English    5. PHYSICIAN NAME:  Arely Grant MD                   PHONE: [phone]    6. CODE STATUS: Level 1 - Full Code        Out of Hospital DNR Attached: No    7. :                                      :  Extended Emergency Contact Information  Primary Emergency Contact: Liyah Gordon  Mobile Phone: [phone]  Relation: Friend  Secondary Emergency Contact: Marlyn Reed  Mobile Phone: [phone]  Relation: Niece   needed? No           Health Care Representative/Proxy:  No           Legal Guardian:  No             NAME OF:           HEALTH CARE REPRESENTATIVE/PROXY:                                         OR           LEGAL GUARDIAN, IF NOT :                                               PHONE:  (Day)           (Night)                        (Cell)    8. REASON FOR TRANSFER: (Must include brief medical history and recent changes in physical function or cognition.) rehab            V/S: BP (!) 85/52 (BP Location: Left arm)   Pulse 76   Temp 98.5 °F (36.9 °C) (Temporal)   Resp 16   Ht 5' 4\" (1.626 m)   Wt 102 kg (225 lb 12 oz)   SpO2 95%   BMI 38.75 kg/m²           PAIN: Yes, Rating 7, Site BLE, and Treatment see orders    9. PRIMARY DIAGNOSIS: Septic shock (HCC)      Secondary Diagnosis:         Pacemaker: No      Internal Defib: No          Mental Health Diagnosis (if Applicable):    10. RESTRAINTS: No     11. RESPIRATORY NEEDS: None    12. ISOLATION/PRECAUTION: MRSA and Site bilat legs    13. ALLERGY: Augmentin [amoxicillin-pot clavulanate], Codeine, and " Erythromycin    14. SENSORY:       Vision Poor and Hearing Poor    15. SKIN CONDITION: Yes:  Vascular    16. DIET: Regular    17. IV ACCESS: None    18. PERSONAL ITEMS SENT WITH PATIENT: Glasses and Otherwallet     19. ATTACHED DOCUMENTS: MUST ATTACH CURRENT MEDICATION INFORMATION Face Sheet, MAR, Discharge Summary, and HX/PE    20. AT RISK ALERTS:Falls        HARM TO: N/A    21. WEIGHT BEARING STATUS:         Left Leg: Limited        Right Leg: Limited    22. MENTAL STATUS:Alert and Oriented    23. FUNCTION:        Walk: With Help        Transfer: With Help        Toilet: With Help        Feed: Self    24. IMMUNIZATIONS/SCREENING:     There is no immunization history on file for this patient.    25. BOWEL: Continent    26. BLADDER: Continent    27. SENDING FACILITY CONTACT: [facility]                  Title: RN        Unit: ICU        Phone: [phone]            REC'G FACILITY CONTACT (if known):        Title:        Unit:         Phone:         FORM PREFILLED BY (if applicable)       Title:       Unit:        Phone:         FORM COMPLETED BY Christal Vann RN      Title: RN      Phone: [phone]

## 2024-09-07 NOTE — ASSESSMENT & PLAN NOTE
Noted bilateral feet  Podiatry saw patient, were able to debride all 10 toenails.  Signed off.  Follow up as necessary  Discharge to rehab today

## 2024-09-07 NOTE — CONSULTS
Vancomycin IV Pharmacy-to-Dose Consultation     Vancomycin has been discontinued.  Pharmacy will sign off.  Please contact or re-consult with questions.    Saima Collins, Pharmacist

## 2024-09-07 NOTE — ASSESSMENT & PLAN NOTE
Sepsis present on admission evidenced by tachycardia, tachypnea, WBC 15.61.  Lactic acid negative  Suspect source most likely bilateral lower extremity leg wounds  Vancomycin IV provided to patient in ED, follow-up on MRSA swab.  Ancef 2 g IV provided to patient in ER, patient found to be positive for MSSA, currently on vancomycin IV, day 9.  On 8/30 patient was continued hypotension after being fluid resuscitated, transferred to stepdown unit for hypotension  Was placed on Levophed, continues with lower BP however no endorgan dysfunction.  Started on Florinef and midodrine  Blood cultures negative  Wound cultures demonstrate MSSA, completed course of IV Vancomycin  Patient will be transitioning to complete care at [place] with the intention of this becoming her long-term care facility  Patient will be discharged today

## 2024-09-07 NOTE — NURSING NOTE
Report given to [facility] Olayinka LPN. Pt with no distress and no complaints noted. Belongings documented upon transfer and sent with pt. IV dcd

## 2024-09-07 NOTE — CASE MANAGEMENT
Case Management Discharge Planning Note    Patient name Barbie Medel  Location ICU 01/ICU 01 MRN [id number]  : 1945 Date 2024       Current Admission Date: 2024  Current Admission Diagnosis:Septic shock (HCC)   Patient Active Problem List    Diagnosis Date Noted Date Diagnosed    Fall 2024     Septic shock (HCC) 2024     Multiple open wounds of lower leg 2024     Ambulatory dysfunction 2024     Onychomycosis 2024       LOS (days): 9  Geometric Mean LOS (GMLOS) (days): 3.6  Days to GMLOS:-5.2     OBJECTIVE:  Risk of Unplanned Readmission Score: 13.56         Current admission status: Inpatient   Preferred Pharmacy:   [facility] [place], [place] - [address]  [address]  [facility] [address]  Phone: [phone] Fax: [phone]    Primary Care Provider: No primary care provider on file.    Primary Insurance: MEDICARE  Secondary Insurance:     DISCHARGE DETAILS:  Other Referral/Resources/Interventions Provided:  Referral Comments: CM spoke with [facility] [place] and they are able to accept pt today.    Transport at Discharge : Wheelchair van  Dispatcher Contacted: Yes  Number/Name of Dispatcher: Roundtrip  Transported by (Company and Unit #): [employer]  ETA of Transport (Date): 24  ETA of Transport (Time): 1530     Family notified:: CM spoke with pt's niece, Marlyn, and informed of 3:30pm transport for pt today to go to [facility] [place].  Additional Comments: [email or web address], provider, and nursing all aware of discharge time and location.    Accepting Facility Name, City & State : [facility] [place].  Receiving Facility/Agency Phone Number: [phone]  Facility/Agency Fax Number: Fax: [phone]

## 2024-09-07 NOTE — ASSESSMENT & PLAN NOTE
"Patient sustained fall in her bathroom, reported that her legs \"just gave out\".  Denies striking head or loss of consciousness.  She was on the floor for 1-1/2 hours before she was able to get her phone off of a shelf and call 911.  She was brought to the emergency department for further evaluation and treatment.  In the ED CT of the head was performed which was negative for intra cranial abnormality, chronic microangiopathic changes, significant sinus mucosal disease of maxillary sinuses as per radiology report.  CT of cervical spine was also performed which showed no fracture or malalignment, severe multilevel degenerative changes.  Diminished osseous trabeculation most pronounced in the C6 vertebral body is likely due to low mineral density  CT chest abdomen pelvis was performed showing age-indeterminate minimally displaced anterior lateral right 10th rib fracture, 2.2 cm region of hypoattenuation in the inferior posterior right hepatic lobe favored to represent a prominent diaphragmatic slip (normal anatomy) where his laceration is less likely despite the age indeterminant right rib fracture as per radiology report.  PT/OT evaluation and treatment, recommending short-term rehab at this time  Continue fall precautions  As noted above patient will be transitioning to complete care at [place] with intention of this becoming her long-term care facility.  Patient is discharged today    "

## 2024-11-01 ENCOUNTER — TELEPHONE (OUTPATIENT)
Age: 79
End: 2024-11-01

## 2024-11-01 NOTE — TELEPHONE ENCOUNTER
"Hello,    The following message was sent via e-mail to the leadership team:     Please advise if you can help facilitate the following overbook request:    Patient Name: Barbie Medel    Patient MRN: [id number]    Call back #: [phone]    Insurance: Medicare    Department:Vascular    Speciality: HCM    Reason for overbook request: OTHER (PLEASE WRITE REASON IN COMMENT SECTION)    Comments (Write \"N/a\" if no comments): Complete care nurses are requesting patient to be sooner. States doppler testing requires sooner visit. Nurse states she will fax report to office.([place])    Requested doctor and location: [facility]     Date of current appointment: 12/13/24      Thank you.      "

## 2024-11-13 NOTE — PROGRESS NOTES
Name: Barbie Medel      : 1945      MRN: [id number]  Encounter Provider: Norris Becerra DO  Encounter Date: 11/15/2024   Encounter department: [facility]  :  Assessment & Plan  Multiple open wounds of lower leg  79-year-old female presents for lower extremity wounds and possible PAD.  She is a nursing home resident she is morbidly obese she has extensive wounds on both legs that do not appear classically consistent with venous stasis ulcers.  She has multilayer wraps on both legs in the office today which I did not remove she had pictures taken from a recent hospitalization that I reviewed in the chart.  The wounds are primarily along her calves bilaterally.  She had an attempt at an LAM when she was in the hospital recently and this was nondiagnostic the cuffs were not tolerated although there was multiphasic Doppler signals at the ankle.  On exam her feet are warm and they have brisk capillary refill I am unable to palpate distal pulses in her feet due to her edema and body habitus.  I suspect she does not have significant peripheral arterial disease but we need to rule this out.  I will schedule her for an outpatient lower extremity arterial duplex and I will discussed the results with her via a virtual appointment once complete.    Orders:    Ambulatory Referral to Vascular Surgery    VAS ARTERIAL DUPLEX- LOWER LIMB BILATERAL; Future        History of Present Illness     New patient, referred for non-healing wounds and presents today for evaluation. LAM 24. CT lower extremity 9/3/24. Non-healing BLE wounds. Very minimally ambulatory.     HPI  Barbie Medel is a 79 y.o. female who presents     Review of Systems   Constitutional: Negative.    HENT:  Positive for hearing loss.    Eyes: Negative.    Respiratory:  Positive for shortness of breath.    Cardiovascular:  Positive for leg swelling.   Gastrointestinal: Negative.    Endocrine: Negative.    Genitourinary: Negative.   "  Musculoskeletal:  Positive for gait problem.   Skin:  Positive for wound.   Allergic/Immunologic: Negative.    Neurological:  Positive for weakness.   Hematological: Negative.    Psychiatric/Behavioral: Negative.     I have reviewed the ROS above and made changes as needed.      Past Medical History     Current Outpatient Medications on File Prior to Visit   Medication Sig Dispense Refill    acetaminophen (TYLENOL) 325 mg tablet Take 2 tablets (650 mg total) by mouth every 6 (six) hours as needed for mild pain, fever or headaches      busPIRone (BUSPAR) 5 mg tablet Take 5 mg by mouth 3 (three) times a day      cyanocobalamin (VITAMIN B-12) 1000 MCG tablet Take 1 tablet (1,000 mcg total) by mouth daily      ergocalciferol (VITAMIN D2) 50,000 units Take 1 capsule (50,000 Units total) by mouth once a week      fludrocortisone (FLORINEF) 0.1 mg tablet Take 1 tablet (0.1 mg total) by mouth daily      folic acid (FOLVITE) 1 mg tablet Take by mouth daily      furosemide (LASIX) 40 mg tablet Take 40 mg by mouth 2 (two) times a day      midodrine (PROAMATINE) 10 MG tablet Take 1 tablet (10 mg total) by mouth 3 (three) times a day before meals      nystatin (MYCOSTATIN) powder Apply topically 2 (two) times a day      sertraline (ZOLOFT) 50 mg tablet Take 50 mg by mouth daily      thiamine 100 MG tablet Take 1 tablet (100 mg total) by mouth daily      sodium hypochlorite (DAKIN'S HALF-STRENGTH) external solution Apply 1 Application topically daily (Patient not taking: Reported on 11/15/2024)       No current facility-administered medications on file prior to visit.     Allergies   Allergen Reactions    Augmentin [Amoxicillin-Pot Clavulanate] Hives    Codeine Syncope    Erythromycin Hives           Objective   /64 (BP Location: Right arm, Patient Position: Sitting)   Pulse 78   Ht 5' 4\" (1.626 m)   BMI 38.75 kg/m²      Physical Exam    General  Exam: alert, awake, oriented, no distress, consistent with stated " age    Integumentary  Exam:     BLE wrapperd, images reviewed in chart of bilateral LE wounds    Head and Neck  Exam: supple, no bruits, trachea midline, no JVD, no mass or palpable nodes    Chest and Lung  Exam: chest normal without deformity, bilaterally expansive, clear to auscultation    Cardiovascular  Exam: regular rate, regular rhythm, no murmurs, no rubs or gallops    Adbomen  Exam: soft, non-tender, non-distended, no pulsatile abdominal masses, no abdominal bruit    Lower Extremity:  Non-palpable distal pulses bilaterally    Neurologic  Exam:alert, non-focal, oriented x 3, cranial nerves II-XII grossly intact        Administrative Statements   I have spent a total time of 30 minutes in caring for this patient on the day of the visit/encounter including Diagnostic results, Prognosis, Importance of tx compliance, Documenting in the medical record, and Reviewing / ordering tests, medicine, procedures  .

## 2024-11-15 ENCOUNTER — OFFICE VISIT (OUTPATIENT)
Dept: VASCULAR SURGERY | Facility: CLINIC | Age: 79
End: 2024-11-15
Payer: MEDICARE

## 2024-11-15 VITALS
DIASTOLIC BLOOD PRESSURE: 64 MMHG | SYSTOLIC BLOOD PRESSURE: 106 MMHG | HEART RATE: 78 BPM | HEIGHT: 64 IN | BODY MASS INDEX: 38.75 KG/M2

## 2024-11-15 DIAGNOSIS — S81.809A MULTIPLE OPEN WOUNDS OF LOWER LEG: ICD-10-CM

## 2024-11-15 PROCEDURE — 99205 OFFICE O/P NEW HI 60 MIN: CPT | Performed by: SURGERY

## 2024-11-15 RX ORDER — FUROSEMIDE 40 MG/1
40 TABLET ORAL 2 TIMES DAILY
COMMUNITY

## 2024-11-15 RX ORDER — FOLIC ACID 1 MG/1
TABLET ORAL DAILY
COMMUNITY

## 2024-11-15 RX ORDER — BUSPIRONE HYDROCHLORIDE 5 MG/1
5 TABLET ORAL 3 TIMES DAILY
COMMUNITY

## 2024-11-15 NOTE — ASSESSMENT & PLAN NOTE
79-year-old female presents for lower extremity wounds and possible PAD.  She is a nursing home resident she is morbidly obese she has extensive wounds on both legs that do not appear classically consistent with venous stasis ulcers.  She has multilayer wraps on both legs in the office today which I did not remove she had pictures taken from a recent hospitalization that I reviewed in the chart.  The wounds are primarily along her calves bilaterally.  She had an attempt at an LAM when she was in the hospital recently and this was nondiagnostic the cuffs were not tolerated although there was multiphasic Doppler signals at the ankle.  On exam her feet are warm and they have brisk capillary refill I am unable to palpate distal pulses in her feet due to her edema and body habitus.  I suspect she does not have significant peripheral arterial disease but we need to rule this out.  I will schedule her for an outpatient lower extremity arterial duplex and I will discussed the results with her via a virtual appointment once complete.    Orders:    Ambulatory Referral to Vascular Surgery    VAS ARTERIAL DUPLEX- LOWER LIMB BILATERAL; Future

## 2025-01-14 ENCOUNTER — HOSPITAL ENCOUNTER (OUTPATIENT)
Dept: RADIOLOGY | Facility: HOSPITAL | Age: 80
Discharge: HOME | End: 2025-01-14
Attending: SURGERY
Payer: MEDICARE

## 2025-01-14 DIAGNOSIS — S81.809A MULTIPLE OPEN WOUNDS OF LOWER LEG: ICD-10-CM

## 2025-01-14 PROCEDURE — 93923 UPR/LXTR ART STDY 3+ LVLS: CPT

## 2025-01-14 PROCEDURE — 93925 LOWER EXTREMITY STUDY: CPT

## 2025-01-15 PROCEDURE — 93922 UPR/L XTREMITY ART 2 LEVELS: CPT | Performed by: SURGERY

## 2025-01-15 PROCEDURE — 93925 LOWER EXTREMITY STUDY: CPT | Performed by: SURGERY

## 2025-02-07 ENCOUNTER — TELEMEDICINE (OUTPATIENT)
Dept: VASCULAR SURGERY | Facility: CLINIC | Age: 80
End: 2025-02-07
Payer: MEDICARE

## 2025-02-07 DIAGNOSIS — S81.802D MULTIPLE OPEN WOUNDS OF LOWER LEG, LEFT, SUBSEQUENT ENCOUNTER: Primary | ICD-10-CM

## 2025-02-07 PROCEDURE — 99213 OFFICE O/P EST LOW 20 MIN: CPT | Performed by: SURGERY

## 2025-02-07 NOTE — ASSESSMENT & PLAN NOTE
79-year-old nursing home patient was seen 3 months ago in the office by myself.  She has wounds on both of her lower extremities and was seen as a referral without any arterial testing.  She had nonpalpable distal pulses and I sent her for a lower extremity arterial duplex.  The results were reviewed with her via a virtual visit on the teams platform her ABIs and PVRs are normal she has toe pressures well above the healing threshold bilaterally I did contact her and speak with her and her nurse and her nursing facility and explained that there is no further arterial testing needed nor revascularization indicated for her wounds.  She can follow-up with vascular on an as-needed basis.

## 2025-02-07 NOTE — PROGRESS NOTES
Virtual Brief Visit  Name: Barbie Medel      : 1945      MRN: [id number]  Encounter Provider: Norris Becerra DO  Encounter Date: 2025   Encounter department: [facility]    This Visit is being completed by telephone. The Patient is located at Home and in the following state in which I hold an active license [place]    The patient was identified by name and date of birth. Barbie Medel was informed that this is a telemedicine visit and that the visit is being conducted through the [employer] Teams platform. She agrees to proceed..  My office door was closed. No one else was in the room.  She acknowledged consent and understanding of privacy and security of the video platform. The patient has agreed to participate and understands they can discontinue the visit at any time.    Patient is aware this is a billable service.     :  Assessment & Plan  Multiple open wounds of lower leg, left, subsequent encounter         Multiple open wounds of lower leg, left, subsequent encounter  79-year-old nursing home patient was seen 3 months ago in the office by myself.  She has wounds on both of her lower extremities and was seen as a referral without any arterial testing.  She had nonpalpable distal pulses and I sent her for a lower extremity arterial duplex.  The results were reviewed with her via a virtual visit on the teams platform her ABIs and PVRs are normal she has toe pressures well above the healing threshold bilaterally I did contact her and speak with her and her nurse and her nursing facility and explained that there is no further arterial testing needed nor revascularization indicated for her wounds.  She can follow-up with vascular on an as-needed basis.             History of Present Illness   HPI    Visit Time  Total Visit Duration: 20